# Patient Record
Sex: MALE | Race: WHITE | NOT HISPANIC OR LATINO | Employment: OTHER
[De-identification: names, ages, dates, MRNs, and addresses within clinical notes are randomized per-mention and may not be internally consistent; named-entity substitution may affect disease eponyms.]

---

## 2017-02-01 ENCOUNTER — HOSPITAL (OUTPATIENT)
Dept: OTHER | Age: 82
End: 2017-02-01
Attending: ANESTHESIOLOGY

## 2017-02-24 ENCOUNTER — HOSPITAL (OUTPATIENT)
Dept: OTHER | Age: 82
End: 2017-02-24
Attending: ANESTHESIOLOGY

## 2017-03-17 ENCOUNTER — HOSPITAL (OUTPATIENT)
Dept: OTHER | Age: 82
End: 2017-03-17

## 2017-03-22 ENCOUNTER — HOSPITAL (OUTPATIENT)
Dept: OTHER | Age: 82
End: 2017-03-22

## 2017-03-22 ENCOUNTER — IMAGING SERVICES (OUTPATIENT)
Dept: OTHER | Age: 82
End: 2017-03-22

## 2017-03-31 ENCOUNTER — HOSPITAL (OUTPATIENT)
Dept: OTHER | Age: 82
End: 2017-03-31
Attending: ANESTHESIOLOGY

## 2017-05-11 ENCOUNTER — HOSPITAL (OUTPATIENT)
Dept: OTHER | Age: 82
End: 2017-05-11
Attending: ANESTHESIOLOGY

## 2017-06-29 ENCOUNTER — HOSPITAL (OUTPATIENT)
Dept: OTHER | Age: 82
End: 2017-06-29
Attending: ANESTHESIOLOGY

## 2017-07-14 ENCOUNTER — IMAGING SERVICES (OUTPATIENT)
Dept: OTHER | Age: 82
End: 2017-07-14

## 2017-07-14 ENCOUNTER — HOSPITAL (OUTPATIENT)
Dept: OTHER | Age: 82
End: 2017-07-14

## 2017-08-09 ENCOUNTER — HOSPITAL (OUTPATIENT)
Dept: OTHER | Age: 82
End: 2017-08-09
Attending: ANESTHESIOLOGY

## 2017-08-16 ENCOUNTER — IMAGING SERVICES (OUTPATIENT)
Dept: OTHER | Age: 82
End: 2017-08-16

## 2017-08-16 ENCOUNTER — HOSPITAL (OUTPATIENT)
Dept: OTHER | Age: 82
End: 2017-08-16

## 2017-11-01 ENCOUNTER — HOSPITAL (OUTPATIENT)
Dept: OTHER | Age: 82
End: 2017-11-01

## 2018-06-06 ENCOUNTER — HOSPITAL (OUTPATIENT)
Dept: OTHER | Age: 83
End: 2018-06-06
Attending: ANESTHESIOLOGY

## 2018-07-18 ENCOUNTER — HOSPITAL (OUTPATIENT)
Dept: OTHER | Age: 83
End: 2018-07-18
Attending: FAMILY MEDICINE

## 2018-08-15 ENCOUNTER — HOSPITAL (OUTPATIENT)
Dept: OTHER | Age: 83
End: 2018-08-15
Attending: ANESTHESIOLOGY

## 2018-08-23 ENCOUNTER — HOSPITAL (OUTPATIENT)
Dept: OTHER | Age: 83
End: 2018-08-23

## 2018-08-23 ENCOUNTER — IMAGING SERVICES (OUTPATIENT)
Dept: OTHER | Age: 83
End: 2018-08-23

## 2018-09-24 ENCOUNTER — HOSPITAL (OUTPATIENT)
Dept: OTHER | Age: 83
End: 2018-09-24

## 2018-09-24 ENCOUNTER — IMAGING SERVICES (OUTPATIENT)
Dept: OTHER | Age: 83
End: 2018-09-24

## 2018-10-17 ENCOUNTER — HOSPITAL (OUTPATIENT)
Dept: OTHER | Age: 83
End: 2018-10-17
Attending: ANESTHESIOLOGY

## 2018-10-26 ENCOUNTER — HOSPITAL (OUTPATIENT)
Dept: OTHER | Age: 83
End: 2018-10-26
Attending: ANESTHESIOLOGY

## 2019-01-17 ENCOUNTER — HOSPITAL (OUTPATIENT)
Dept: OTHER | Age: 84
End: 2019-01-17

## 2019-02-06 ENCOUNTER — HOSPITAL (OUTPATIENT)
Dept: OTHER | Age: 84
End: 2019-02-06

## 2019-02-13 ENCOUNTER — HOSPITAL (OUTPATIENT)
Dept: OTHER | Age: 84
End: 2019-02-13

## 2019-07-10 ENCOUNTER — HOSPITAL (OUTPATIENT)
Dept: OTHER | Age: 84
End: 2019-07-10
Attending: INTERNAL MEDICINE

## 2019-07-10 ENCOUNTER — HOSPITAL (OUTPATIENT)
Dept: OTHER | Age: 84
End: 2019-07-10

## 2019-07-29 ENCOUNTER — HOSPITAL (OUTPATIENT)
Dept: OTHER | Age: 84
End: 2019-07-29
Attending: INTERNAL MEDICINE

## 2019-08-14 ENCOUNTER — HOSPITAL (OUTPATIENT)
Dept: OTHER | Age: 84
End: 2019-08-14

## 2019-09-11 ENCOUNTER — HOSPITAL (OUTPATIENT)
Dept: OTHER | Age: 84
End: 2019-09-11
Attending: INTERNAL MEDICINE

## 2020-05-19 ENCOUNTER — HOSPITAL (OUTPATIENT)
Dept: OTHER | Age: 85
End: 2020-05-19
Attending: ANESTHESIOLOGY

## 2020-12-19 ENCOUNTER — WALK IN (OUTPATIENT)
Dept: URGENT CARE | Age: 85
End: 2020-12-19
Attending: EMERGENCY MEDICINE

## 2020-12-19 VITALS
HEART RATE: 67 BPM | SYSTOLIC BLOOD PRESSURE: 152 MMHG | OXYGEN SATURATION: 95 % | RESPIRATION RATE: 16 BRPM | DIASTOLIC BLOOD PRESSURE: 71 MMHG | WEIGHT: 132 LBS | TEMPERATURE: 100 F

## 2020-12-19 DIAGNOSIS — Z20.822 PERSON UNDER INVESTIGATION FOR COVID-19: ICD-10-CM

## 2020-12-19 DIAGNOSIS — Z20.822 EXPOSURE TO COVID-19 VIRUS: ICD-10-CM

## 2020-12-19 DIAGNOSIS — R50.9 FEVER, UNSPECIFIED FEVER CAUSE: Primary | ICD-10-CM

## 2020-12-19 LAB — SARS-COV-2 AG RESP QL IA.RAPID: NOT DETECTED

## 2020-12-19 PROCEDURE — 99212 OFFICE O/P EST SF 10 MIN: CPT

## 2020-12-19 PROCEDURE — C9803 HOPD COVID-19 SPEC COLLECT: HCPCS

## 2020-12-19 PROCEDURE — 87426 SARSCOV CORONAVIRUS AG IA: CPT | Performed by: EMERGENCY MEDICINE

## 2020-12-19 PROCEDURE — U0003 INFECTIOUS AGENT DETECTION BY NUCLEIC ACID (DNA OR RNA); SEVERE ACUTE RESPIRATORY SYNDROME CORONAVIRUS 2 (SARS-COV-2) (CORONAVIRUS DISEASE [COVID-19]), AMPLIFIED PROBE TECHNIQUE, MAKING USE OF HIGH THROUGHPUT TECHNOLOGIES AS DESCRIBED BY CMS-2020-01-R: HCPCS | Performed by: EMERGENCY MEDICINE

## 2020-12-19 RX ORDER — PANTOPRAZOLE SODIUM 40 MG/1
TABLET, DELAYED RELEASE ORAL
COMMUNITY
Start: 2020-11-27 | End: 2022-07-15

## 2020-12-19 RX ORDER — HYDRALAZINE HYDROCHLORIDE 50 MG/1
TABLET, FILM COATED ORAL
COMMUNITY
Start: 2020-11-27 | End: 2022-07-15 | Stop reason: SDUPTHER

## 2020-12-19 RX ORDER — ISOSORBIDE MONONITRATE 30 MG/1
30 TABLET, EXTENDED RELEASE ORAL DAILY
COMMUNITY
Start: 2020-11-27

## 2020-12-19 RX ORDER — ATORVASTATIN CALCIUM 40 MG/1
40 TABLET, FILM COATED ORAL DAILY
COMMUNITY

## 2020-12-19 RX ORDER — LACTULOSE 10 G/15ML
SOLUTION ORAL DAILY
COMMUNITY
Start: 2020-10-05 | End: 2022-07-15

## 2020-12-19 RX ORDER — METOPROLOL SUCCINATE 25 MG/1
25 TABLET, EXTENDED RELEASE ORAL DAILY
COMMUNITY
Start: 2020-10-05 | End: 2023-07-07

## 2020-12-19 RX ORDER — TRAMADOL HYDROCHLORIDE 50 MG/1
50 TABLET ORAL EVERY 8 HOURS PRN
COMMUNITY
Start: 2020-10-08

## 2020-12-19 RX ORDER — BACLOFEN 10 MG/1
TABLET ORAL
COMMUNITY
Start: 2016-02-18 | End: 2022-07-15 | Stop reason: SDUPTHER

## 2020-12-19 RX ORDER — AMLODIPINE BESYLATE 5 MG/1
TABLET ORAL
COMMUNITY

## 2020-12-20 LAB
SARS-COV-2 RNA RESP QL NAA+PROBE: DETECTED
SERVICE CMNT-IMP: ABNORMAL

## 2020-12-21 ENCOUNTER — TELEPHONE (OUTPATIENT)
Dept: SCHEDULING | Age: 85
End: 2020-12-21

## 2020-12-23 ENCOUNTER — TELEPHONE (OUTPATIENT)
Dept: SCHEDULING | Age: 85
End: 2020-12-23

## 2021-01-22 ENCOUNTER — APPOINTMENT (OUTPATIENT)
Dept: ULTRASOUND IMAGING | Age: 86
End: 2021-01-22
Attending: INTERNAL MEDICINE

## 2021-02-11 ENCOUNTER — IMAGING SERVICES (OUTPATIENT)
Dept: ULTRASOUND IMAGING | Age: 86
End: 2021-02-11
Attending: INTERNAL MEDICINE

## 2021-02-11 DIAGNOSIS — E05.90 HYPERTHYROIDISM: ICD-10-CM

## 2021-02-11 DIAGNOSIS — R10.13 ABDOMINAL PAIN, EPIGASTRIC: ICD-10-CM

## 2021-02-11 DIAGNOSIS — N18.4 CHRONIC KIDNEY DISEASE, STAGE IV (SEVERE) (CMD): ICD-10-CM

## 2021-02-11 PROCEDURE — 76700 US EXAM ABDOM COMPLETE: CPT | Performed by: INTERNAL MEDICINE

## 2021-02-11 PROCEDURE — 76536 US EXAM OF HEAD AND NECK: CPT | Performed by: RADIOLOGY

## 2021-05-25 ENCOUNTER — LAB SERVICES (OUTPATIENT)
Dept: LAB | Age: 86
End: 2021-05-25

## 2021-05-25 ENCOUNTER — LAB REQUISITION (OUTPATIENT)
Dept: LAB | Age: 86
End: 2021-05-25

## 2021-05-25 DIAGNOSIS — N18.4 CHRONIC KIDNEY DISEASE, STAGE 4 (SEVERE) (CMD): ICD-10-CM

## 2021-05-25 DIAGNOSIS — R80.9 PROTEINURIA, UNSPECIFIED: ICD-10-CM

## 2021-05-25 DIAGNOSIS — D63.1 ANEMIA IN CHRONIC KIDNEY DISEASE (CODE): ICD-10-CM

## 2021-05-25 DIAGNOSIS — Z95.1 PRESENCE OF AORTOCORONARY BYPASS GRAFT: ICD-10-CM

## 2021-05-25 DIAGNOSIS — I25.10 ATHEROSCLEROTIC HEART DISEASE OF NATIVE CORONARY ARTERY WITHOUT ANGINA PECTORIS: ICD-10-CM

## 2021-05-25 DIAGNOSIS — I12.9 HYPERTENSIVE CHRONIC KIDNEY DISEASE WITH STAGE 1 THROUGH STAGE 4 CHRONIC KIDNEY DISEASE, OR UNSPECIFIED CHRONIC KIDNEY DISEASE: ICD-10-CM

## 2021-05-25 LAB
25(OH)D3+25(OH)D2 SERPL-MCNC: 50.4 NG/ML (ref 30–100)
ALBUMIN SERPL-MCNC: 3.7 G/DL (ref 3.6–5.1)
ALBUMIN/GLOB SERPL: 1.2 {RATIO} (ref 1–2.4)
ALP SERPL-CCNC: 41 UNITS/L (ref 45–117)
ALT SERPL-CCNC: 17 UNITS/L
ANION GAP SERPL CALC-SCNC: 16 MMOL/L (ref 10–20)
APPEARANCE UR: CLEAR
AST SERPL-CCNC: 11 UNITS/L
BACTERIA #/AREA URNS HPF: ABNORMAL /HPF
BASOPHILS # BLD: 0 K/MCL (ref 0–0.3)
BASOPHILS NFR BLD: 0 %
BILIRUB SERPL-MCNC: 0.5 MG/DL (ref 0.2–1)
BILIRUB UR QL STRIP: NEGATIVE
BUN SERPL-MCNC: 60 MG/DL (ref 6–20)
BUN/CREAT SERPL: 23 (ref 7–25)
CALCIUM SERPL-MCNC: 9.1 MG/DL (ref 8.4–10.2)
CHLORIDE SERPL-SCNC: 111 MMOL/L (ref 98–107)
CO2 SERPL-SCNC: 20 MMOL/L (ref 21–32)
COLOR UR: YELLOW
CREAT SERPL-MCNC: 2.6 MG/DL (ref 0.67–1.17)
DEPRECATED RDW RBC: 37.7 FL (ref 39–50)
EOSINOPHIL # BLD: 0 K/MCL (ref 0–0.5)
EOSINOPHIL NFR BLD: 0 %
ERYTHROCYTE [DISTWIDTH] IN BLOOD: 15.9 % (ref 11–15)
FASTING DURATION TIME PATIENT: 0 HOURS
FERRITIN SERPL-MCNC: 300 NG/ML (ref 26–388)
GFR SERPLBLD BASED ON 1.73 SQ M-ARVRAT: 22 ML/MIN/1.73M2
GLOBULIN SER-MCNC: 3.2 G/DL (ref 2–4)
GLUCOSE SERPL-MCNC: 91 MG/DL (ref 65–99)
GLUCOSE UR STRIP-MCNC: NEGATIVE MG/DL
HCT VFR BLD CALC: 33.3 % (ref 39–51)
HGB BLD-MCNC: 10.1 G/DL (ref 13–17)
HGB UR QL STRIP: NEGATIVE
HYALINE CASTS #/AREA URNS LPF: ABNORMAL /LPF
IMM GRANULOCYTES # BLD AUTO: 0 K/MCL (ref 0–0.2)
IMM GRANULOCYTES # BLD: 0 %
IRON SATN MFR SERPL: 12 % (ref 15–45)
IRON SERPL-MCNC: 29 MCG/DL (ref 65–175)
KETONES UR STRIP-MCNC: NEGATIVE MG/DL
LEUKOCYTE ESTERASE UR QL STRIP: NEGATIVE
LYMPHOCYTES # BLD: 0.5 K/MCL (ref 1–4)
LYMPHOCYTES NFR BLD: 6 %
MCH RBC QN AUTO: 20.6 PG (ref 26–34)
MCHC RBC AUTO-ENTMCNC: 30.3 G/DL (ref 32–36.5)
MCV RBC AUTO: 67.8 FL (ref 78–100)
MONOCYTES # BLD: 0.8 K/MCL (ref 0.3–0.9)
MONOCYTES NFR BLD: 10 %
MUCOUS THREADS URNS QL MICRO: PRESENT
NEUTROPHILS # BLD: 6.6 K/MCL (ref 1.8–7.7)
NEUTROPHILS NFR BLD: 84 %
NITRITE UR QL STRIP: NEGATIVE
NRBC BLD MANUAL-RTO: 0 /100 WBC
PH UR STRIP: 6 [PH] (ref 5–7)
PLATELET # BLD AUTO: 211 K/MCL (ref 140–450)
POTASSIUM SERPL-SCNC: 4.3 MMOL/L (ref 3.4–5.1)
PROT SERPL-MCNC: 6.9 G/DL (ref 6.4–8.2)
PROT UR STRIP-MCNC: >=500 MG/DL
RBC # BLD: 4.91 MIL/MCL (ref 4.5–5.9)
RBC #/AREA URNS HPF: ABNORMAL /HPF
SODIUM SERPL-SCNC: 143 MMOL/L (ref 135–145)
SP GR UR STRIP: 1.01 (ref 1–1.03)
SQUAMOUS #/AREA URNS HPF: ABNORMAL /HPF
TIBC SERPL-MCNC: 234 MCG/DL (ref 250–450)
UROBILINOGEN UR STRIP-MCNC: 0.2 MG/DL
WBC # BLD: 7.9 K/MCL (ref 4.2–11)
WBC #/AREA URNS HPF: ABNORMAL /HPF

## 2021-05-25 PROCEDURE — 81001 URINALYSIS AUTO W/SCOPE: CPT | Performed by: CLINICAL MEDICAL LABORATORY

## 2021-05-25 PROCEDURE — 82043 UR ALBUMIN QUANTITATIVE: CPT | Performed by: CLINICAL MEDICAL LABORATORY

## 2021-05-25 PROCEDURE — 84100 ASSAY OF PHOSPHORUS: CPT | Performed by: CLINICAL MEDICAL LABORATORY

## 2021-05-25 PROCEDURE — 83540 ASSAY OF IRON: CPT | Performed by: CLINICAL MEDICAL LABORATORY

## 2021-05-25 PROCEDURE — 83550 IRON BINDING TEST: CPT | Performed by: CLINICAL MEDICAL LABORATORY

## 2021-05-25 PROCEDURE — 82728 ASSAY OF FERRITIN: CPT | Performed by: CLINICAL MEDICAL LABORATORY

## 2021-05-25 PROCEDURE — 83970 ASSAY OF PARATHORMONE: CPT | Performed by: CLINICAL MEDICAL LABORATORY

## 2021-05-25 PROCEDURE — 82570 ASSAY OF URINE CREATININE: CPT | Performed by: CLINICAL MEDICAL LABORATORY

## 2021-05-25 PROCEDURE — 82306 VITAMIN D 25 HYDROXY: CPT | Performed by: CLINICAL MEDICAL LABORATORY

## 2021-05-25 PROCEDURE — 80053 COMPREHEN METABOLIC PANEL: CPT | Performed by: CLINICAL MEDICAL LABORATORY

## 2021-05-25 PROCEDURE — 85025 COMPLETE CBC W/AUTO DIFF WBC: CPT | Performed by: CLINICAL MEDICAL LABORATORY

## 2021-05-26 LAB
CREAT UR-MCNC: 76.4 MG/DL
MICROALBUMIN UR-MCNC: 150 MG/DL
MICROALBUMIN/CREAT UR: 1963.4 MG/G
PHOSPHATE SERPL-MCNC: 3.5 MG/DL (ref 2.4–4.7)
PTH-INTACT SERPL-MCNC: 27 PG/ML (ref 19–88)

## 2021-07-23 ENCOUNTER — LAB SERVICES (OUTPATIENT)
Dept: LAB | Age: 86
End: 2021-07-23

## 2021-07-23 ENCOUNTER — LAB REQUISITION (OUTPATIENT)
Dept: LAB | Age: 86
End: 2021-07-23

## 2021-07-23 DIAGNOSIS — E05.90 THYROTOXICOSIS, UNSPECIFIED WITHOUT THYROTOXIC CRISIS OR STORM: ICD-10-CM

## 2021-07-23 DIAGNOSIS — Z95.1 PRESENCE OF AORTOCORONARY BYPASS GRAFT: ICD-10-CM

## 2021-07-23 DIAGNOSIS — E78.5 HYPERLIPIDEMIA, UNSPECIFIED: ICD-10-CM

## 2021-07-23 LAB
ALBUMIN SERPL-MCNC: 4.1 G/DL (ref 3.6–5.1)
ALBUMIN/GLOB SERPL: 1.5 {RATIO} (ref 1–2.4)
ALP SERPL-CCNC: 34 UNITS/L (ref 45–117)
ALT SERPL-CCNC: 19 UNITS/L
ANION GAP SERPL CALC-SCNC: 11 MMOL/L (ref 10–20)
AST SERPL-CCNC: 14 UNITS/L
BASOPHILS # BLD: 0 K/MCL (ref 0–0.3)
BASOPHILS NFR BLD: 0 %
BILIRUB SERPL-MCNC: 0.7 MG/DL (ref 0.2–1)
BUN SERPL-MCNC: 71 MG/DL (ref 6–20)
BUN/CREAT SERPL: 27 (ref 7–25)
CALCIUM SERPL-MCNC: 9.6 MG/DL (ref 8.4–10.2)
CHLORIDE SERPL-SCNC: 113 MMOL/L (ref 98–107)
CHOLEST SERPL-MCNC: 125 MG/DL
CHOLEST/HDLC SERPL: 2.2 {RATIO}
CO2 SERPL-SCNC: 23 MMOL/L (ref 21–32)
CREAT SERPL-MCNC: 2.64 MG/DL (ref 0.67–1.17)
DEPRECATED RDW RBC: 40.6 FL (ref 39–50)
EOSINOPHIL # BLD: 0.1 K/MCL (ref 0–0.5)
EOSINOPHIL NFR BLD: 1 %
ERYTHROCYTE [DISTWIDTH] IN BLOOD: 17 % (ref 11–15)
FASTING DURATION TIME PATIENT: 12 HOURS
FASTING DURATION TIME PATIENT: 12 HOURS
GFR SERPLBLD BASED ON 1.73 SQ M-ARVRAT: 21 ML/MIN/1.73M2
GLOBULIN SER-MCNC: 2.7 G/DL (ref 2–4)
GLUCOSE SERPL-MCNC: 107 MG/DL (ref 65–99)
HCT VFR BLD CALC: 32 % (ref 39–51)
HDLC SERPL-MCNC: 57 MG/DL
HGB BLD-MCNC: 9.6 G/DL (ref 13–17)
IMM GRANULOCYTES # BLD AUTO: 0 K/MCL (ref 0–0.2)
IMM GRANULOCYTES # BLD: 0 %
LDLC SERPL CALC-MCNC: 52 MG/DL
LYMPHOCYTES # BLD: 1.3 K/MCL (ref 1–4)
LYMPHOCYTES NFR BLD: 17 %
MCH RBC QN AUTO: 20.6 PG (ref 26–34)
MCHC RBC AUTO-ENTMCNC: 30 G/DL (ref 32–36.5)
MCV RBC AUTO: 68.8 FL (ref 78–100)
MONOCYTES # BLD: 0.8 K/MCL (ref 0.3–0.9)
MONOCYTES NFR BLD: 10 %
NEUTROPHILS # BLD: 5.6 K/MCL (ref 1.8–7.7)
NEUTROPHILS NFR BLD: 72 %
NONHDLC SERPL-MCNC: 68 MG/DL
NRBC BLD MANUAL-RTO: 0 /100 WBC
PLATELET # BLD AUTO: 212 K/MCL (ref 140–450)
POTASSIUM SERPL-SCNC: 5 MMOL/L (ref 3.4–5.1)
PROT SERPL-MCNC: 6.8 G/DL (ref 6.4–8.2)
RBC # BLD: 4.65 MIL/MCL (ref 4.5–5.9)
SODIUM SERPL-SCNC: 142 MMOL/L (ref 135–145)
T4 SERPL-MCNC: 8.1 MCG/DL (ref 4.7–13.3)
TRIGL SERPL-MCNC: 79 MG/DL
TSH SERPL-ACNC: 1.01 MCUNITS/ML (ref 0.35–5)
WBC # BLD: 7.8 K/MCL (ref 4.2–11)

## 2021-07-23 PROCEDURE — 84436 ASSAY OF TOTAL THYROXINE: CPT | Performed by: CLINICAL MEDICAL LABORATORY

## 2021-07-23 PROCEDURE — 80050 GENERAL HEALTH PANEL: CPT | Performed by: CLINICAL MEDICAL LABORATORY

## 2021-07-23 PROCEDURE — 80061 LIPID PANEL: CPT | Performed by: CLINICAL MEDICAL LABORATORY

## 2021-09-14 ENCOUNTER — LAB SERVICES (OUTPATIENT)
Dept: LAB | Age: 86
End: 2021-09-14

## 2021-09-14 ENCOUNTER — LAB REQUISITION (OUTPATIENT)
Dept: LAB | Age: 86
End: 2021-09-14

## 2021-09-14 DIAGNOSIS — I12.9 HYPERTENSIVE CHRONIC KIDNEY DISEASE WITH STAGE 1 THROUGH STAGE 4 CHRONIC KIDNEY DISEASE, OR UNSPECIFIED CHRONIC KIDNEY DISEASE: ICD-10-CM

## 2021-09-14 DIAGNOSIS — D63.1 ANEMIA IN CHRONIC KIDNEY DISEASE (CODE): ICD-10-CM

## 2021-09-14 DIAGNOSIS — I25.10 ATHEROSCLEROTIC HEART DISEASE OF NATIVE CORONARY ARTERY WITHOUT ANGINA PECTORIS: ICD-10-CM

## 2021-09-14 DIAGNOSIS — R80.9 PROTEINURIA, UNSPECIFIED: ICD-10-CM

## 2021-09-14 DIAGNOSIS — N18.4 CHRONIC KIDNEY DISEASE, STAGE 4 (SEVERE) (CMD): ICD-10-CM

## 2021-09-14 DIAGNOSIS — Z95.1 PRESENCE OF AORTOCORONARY BYPASS GRAFT: ICD-10-CM

## 2021-09-14 LAB
25(OH)D3+25(OH)D2 SERPL-MCNC: 45.2 NG/ML (ref 30–100)
BASOPHILS # BLD: 0 K/MCL (ref 0–0.3)
BASOPHILS NFR BLD: 0 %
DEPRECATED RDW RBC: 41.7 FL (ref 39–50)
EOSINOPHIL # BLD: 0.1 K/MCL (ref 0–0.5)
EOSINOPHIL NFR BLD: 1 %
ERYTHROCYTE [DISTWIDTH] IN BLOOD: 17 % (ref 11–15)
FERRITIN SERPL-MCNC: 286 NG/ML (ref 26–388)
HBV SURFACE AB SER QL: NEGATIVE
HBV SURFACE AG SER QL: NEGATIVE
HCT VFR BLD CALC: 32.1 % (ref 39–51)
HCV AB SER QL: NEGATIVE
HGB BLD-MCNC: 9.6 G/DL (ref 13–17)
IMM GRANULOCYTES # BLD AUTO: 0 K/MCL (ref 0–0.2)
IMM GRANULOCYTES # BLD: 0 %
IRON SATN MFR SERPL: 33 % (ref 15–45)
IRON SERPL-MCNC: 83 MCG/DL (ref 65–175)
LYMPHOCYTES # BLD: 1 K/MCL (ref 1–4)
LYMPHOCYTES NFR BLD: 17 %
MCH RBC QN AUTO: 20.7 PG (ref 26–34)
MCHC RBC AUTO-ENTMCNC: 29.9 G/DL (ref 32–36.5)
MCV RBC AUTO: 69.2 FL (ref 78–100)
MONOCYTES # BLD: 0.5 K/MCL (ref 0.3–0.9)
MONOCYTES NFR BLD: 8 %
NEUTROPHILS # BLD: 4.7 K/MCL (ref 1.8–7.7)
NEUTROPHILS NFR BLD: 74 %
NRBC BLD MANUAL-RTO: 0 /100 WBC
PHOSPHATE SERPL-MCNC: 3.4 MG/DL (ref 2.4–4.7)
PLATELET # BLD AUTO: 221 K/MCL (ref 140–450)
RBC # BLD: 4.64 MIL/MCL (ref 4.5–5.9)
TIBC SERPL-MCNC: 249 MCG/DL (ref 250–450)
WBC # BLD: 6.3 K/MCL (ref 4.2–11)

## 2021-09-14 PROCEDURE — 86706 HEP B SURFACE ANTIBODY: CPT | Performed by: CLINICAL MEDICAL LABORATORY

## 2021-09-14 PROCEDURE — 85025 COMPLETE CBC W/AUTO DIFF WBC: CPT | Performed by: CLINICAL MEDICAL LABORATORY

## 2021-09-14 PROCEDURE — 86803 HEPATITIS C AB TEST: CPT | Performed by: CLINICAL MEDICAL LABORATORY

## 2021-09-14 PROCEDURE — 87340 HEPATITIS B SURFACE AG IA: CPT | Performed by: CLINICAL MEDICAL LABORATORY

## 2021-09-14 PROCEDURE — 83550 IRON BINDING TEST: CPT | Performed by: CLINICAL MEDICAL LABORATORY

## 2021-09-14 PROCEDURE — 81001 URINALYSIS AUTO W/SCOPE: CPT | Performed by: CLINICAL MEDICAL LABORATORY

## 2021-09-14 PROCEDURE — 82570 ASSAY OF URINE CREATININE: CPT | Performed by: CLINICAL MEDICAL LABORATORY

## 2021-09-14 PROCEDURE — 86335 IMMUNFIX E-PHORSIS/URINE/CSF: CPT | Performed by: CLINICAL MEDICAL LABORATORY

## 2021-09-14 PROCEDURE — 82306 VITAMIN D 25 HYDROXY: CPT | Performed by: CLINICAL MEDICAL LABORATORY

## 2021-09-14 PROCEDURE — 83540 ASSAY OF IRON: CPT | Performed by: CLINICAL MEDICAL LABORATORY

## 2021-09-14 PROCEDURE — 84165 PROTEIN E-PHORESIS SERUM: CPT | Performed by: CLINICAL MEDICAL LABORATORY

## 2021-09-14 PROCEDURE — 84100 ASSAY OF PHOSPHORUS: CPT | Performed by: CLINICAL MEDICAL LABORATORY

## 2021-09-14 PROCEDURE — 82043 UR ALBUMIN QUANTITATIVE: CPT | Performed by: CLINICAL MEDICAL LABORATORY

## 2021-09-14 PROCEDURE — 82728 ASSAY OF FERRITIN: CPT | Performed by: CLINICAL MEDICAL LABORATORY

## 2021-09-14 PROCEDURE — 84155 ASSAY OF PROTEIN SERUM: CPT | Performed by: CLINICAL MEDICAL LABORATORY

## 2021-09-15 LAB
ALBUMIN SERPL ELPH-MCNC: 4.3 G/DL (ref 3.5–4.9)
ALPHA 1: 0.3 G/DL (ref 0.2–0.4)
ALPHA2 GLOB SERPL ELPH-MCNC: 0.9 G/DL (ref 0.5–0.9)
APPEARANCE UR: CLEAR
B-GLOBULIN SERPL ELPH-MCNC: 0.6 G/DL (ref 0.7–1.2)
BACTERIA #/AREA URNS HPF: ABNORMAL /HPF
BILIRUB UR QL STRIP: NEGATIVE
COLOR UR: YELLOW
CREAT UR-MCNC: 81.5 MG/DL
GAMMA GLOB SERPL ELPH-MCNC: 0.8 G/DL (ref 0.7–1.7)
GLOBULIN SER-MCNC: 2.6 G/DL (ref 2.1–4.2)
GLUCOSE UR STRIP-MCNC: NEGATIVE MG/DL
HGB UR QL STRIP: NEGATIVE
HYALINE CASTS #/AREA URNS LPF: ABNORMAL /LPF
KETONES UR STRIP-MCNC: NEGATIVE MG/DL
LEUKOCYTE ESTERASE UR QL STRIP: NEGATIVE
MICROALBUMIN UR-MCNC: 161 MG/DL
MICROALBUMIN/CREAT UR: 1975.5 MG/G
MUCOUS THREADS URNS QL MICRO: PRESENT
NITRITE UR QL STRIP: NEGATIVE
PATH INTERP SPEC-IMP: ABNORMAL
PATH INTERP SPEC-IMP: NORMAL
PH UR STRIP: 6 [PH] (ref 5–7)
PROT SERPL-MCNC: 6.9 G/DL (ref 6.4–8.2)
PROT UR STRIP-MCNC: 100 MG/DL
RBC #/AREA URNS HPF: ABNORMAL /HPF
SP GR UR STRIP: 1.01 (ref 1–1.03)
SQUAMOUS #/AREA URNS HPF: ABNORMAL /HPF
UROBILINOGEN UR STRIP-MCNC: 0.2 MG/DL
WBC #/AREA URNS HPF: ABNORMAL /HPF

## 2021-09-23 ENCOUNTER — LAB REQUISITION (OUTPATIENT)
Dept: LAB | Age: 86
End: 2021-09-23

## 2021-09-23 ENCOUNTER — LAB SERVICES (OUTPATIENT)
Dept: LAB | Age: 86
End: 2021-09-23

## 2021-09-23 DIAGNOSIS — I12.9 HYPERTENSIVE CHRONIC KIDNEY DISEASE WITH STAGE 1 THROUGH STAGE 4 CHRONIC KIDNEY DISEASE, OR UNSPECIFIED CHRONIC KIDNEY DISEASE: ICD-10-CM

## 2021-09-23 DIAGNOSIS — N18.4 CHRONIC KIDNEY DISEASE, STAGE 4 (SEVERE) (CMD): ICD-10-CM

## 2021-09-23 PROCEDURE — 80053 COMPREHEN METABOLIC PANEL: CPT | Performed by: CLINICAL MEDICAL LABORATORY

## 2021-09-24 LAB
ALBUMIN SERPL-MCNC: 3.9 G/DL (ref 3.6–5.1)
ALBUMIN/GLOB SERPL: 1.3 {RATIO} (ref 1–2.4)
ALP SERPL-CCNC: 36 UNITS/L (ref 45–117)
ALT SERPL-CCNC: 18 UNITS/L
ANION GAP SERPL CALC-SCNC: 14 MMOL/L (ref 10–20)
AST SERPL-CCNC: 14 UNITS/L
BILIRUB SERPL-MCNC: 0.6 MG/DL (ref 0.2–1)
BUN SERPL-MCNC: 62 MG/DL (ref 6–20)
BUN/CREAT SERPL: 22 (ref 7–25)
CALCIUM SERPL-MCNC: 9.1 MG/DL (ref 8.4–10.2)
CHLORIDE SERPL-SCNC: 106 MMOL/L (ref 98–107)
CO2 SERPL-SCNC: 23 MMOL/L (ref 21–32)
CREAT SERPL-MCNC: 2.84 MG/DL (ref 0.67–1.17)
FASTING DURATION TIME PATIENT: ABNORMAL H
GFR SERPLBLD BASED ON 1.73 SQ M-ARVRAT: 20 ML/MIN
GLOBULIN SER-MCNC: 2.9 G/DL (ref 2–4)
GLUCOSE SERPL-MCNC: 86 MG/DL (ref 70–99)
POTASSIUM SERPL-SCNC: 4.4 MMOL/L (ref 3.4–5.1)
PROT SERPL-MCNC: 6.8 G/DL (ref 6.4–8.2)
SODIUM SERPL-SCNC: 139 MMOL/L (ref 135–145)

## 2021-11-08 RX ORDER — OMEPRAZOLE 20 MG/1
20 CAPSULE, DELAYED RELEASE ORAL DAILY
COMMUNITY
End: 2022-07-15

## 2021-11-08 RX ORDER — TAMSULOSIN HYDROCHLORIDE 0.4 MG/1
0.4 CAPSULE ORAL DAILY
COMMUNITY

## 2021-11-08 RX ORDER — ACETAMINOPHEN 500 MG
1000 TABLET ORAL 2 TIMES DAILY
COMMUNITY

## 2021-11-08 RX ORDER — FAMOTIDINE 40 MG/1
40 TABLET, FILM COATED ORAL DAILY
COMMUNITY

## 2021-11-08 RX ORDER — METHIMAZOLE 5 MG/1
5 TABLET ORAL DAILY
COMMUNITY
End: 2023-07-07

## 2021-12-21 ENCOUNTER — LAB REQUISITION (OUTPATIENT)
Dept: LAB | Age: 86
End: 2021-12-21

## 2021-12-21 ENCOUNTER — LAB SERVICES (OUTPATIENT)
Dept: LAB | Age: 86
End: 2021-12-21

## 2021-12-21 DIAGNOSIS — N18.4 CHRONIC KIDNEY DISEASE, STAGE 4 (SEVERE) (CMD): ICD-10-CM

## 2021-12-21 DIAGNOSIS — I12.9 HYPERTENSIVE CHRONIC KIDNEY DISEASE WITH STAGE 1 THROUGH STAGE 4 CHRONIC KIDNEY DISEASE, OR UNSPECIFIED CHRONIC KIDNEY DISEASE: ICD-10-CM

## 2021-12-21 DIAGNOSIS — R80.9 PROTEINURIA, UNSPECIFIED: ICD-10-CM

## 2021-12-21 DIAGNOSIS — D63.1 ANEMIA IN CHRONIC KIDNEY DISEASE (CODE): ICD-10-CM

## 2021-12-21 DIAGNOSIS — I25.10 ATHEROSCLEROTIC HEART DISEASE OF NATIVE CORONARY ARTERY WITHOUT ANGINA PECTORIS: ICD-10-CM

## 2021-12-21 LAB
25(OH)D3+25(OH)D2 SERPL-MCNC: 49.2 NG/ML (ref 30–100)
ALBUMIN SERPL-MCNC: 3.6 G/DL (ref 3.6–5.1)
ALBUMIN/GLOB SERPL: 1.2 {RATIO} (ref 1–2.4)
ALP SERPL-CCNC: 36 UNITS/L (ref 45–117)
ALT SERPL-CCNC: 15 UNITS/L
ANION GAP SERPL CALC-SCNC: 13 MMOL/L (ref 10–20)
APPEARANCE UR: CLEAR
AST SERPL-CCNC: 10 UNITS/L
BACTERIA #/AREA URNS HPF: ABNORMAL /HPF
BASOPHILS # BLD: 0 K/MCL (ref 0–0.3)
BASOPHILS NFR BLD: 0 %
BILIRUB SERPL-MCNC: 0.5 MG/DL (ref 0.2–1)
BILIRUB UR QL STRIP: NEGATIVE
BUN SERPL-MCNC: 69 MG/DL (ref 6–20)
BUN/CREAT SERPL: 22 (ref 7–25)
CALCIUM SERPL-MCNC: 9.2 MG/DL (ref 8.4–10.2)
CHLORIDE SERPL-SCNC: 113 MMOL/L (ref 98–107)
CO2 SERPL-SCNC: 22 MMOL/L (ref 21–32)
COLOR UR: YELLOW
CREAT SERPL-MCNC: 3.12 MG/DL (ref 0.67–1.17)
CREAT UR-MCNC: 110 MG/DL
DEPRECATED RDW RBC: 42.5 FL (ref 39–50)
EOSINOPHIL # BLD: 0.1 K/MCL (ref 0–0.5)
EOSINOPHIL NFR BLD: 1 %
ERYTHROCYTE [DISTWIDTH] IN BLOOD: 17.2 % (ref 11–15)
FASTING DURATION TIME PATIENT: ABNORMAL H
FERRITIN SERPL-MCNC: 230 NG/ML (ref 26–388)
GFR SERPLBLD BASED ON 1.73 SQ M-ARVRAT: 17 ML/MIN
GLOBULIN SER-MCNC: 2.9 G/DL (ref 2–4)
GLUCOSE SERPL-MCNC: 105 MG/DL (ref 70–99)
GLUCOSE UR STRIP-MCNC: NEGATIVE MG/DL
HCT VFR BLD CALC: 31.7 % (ref 39–51)
HGB BLD-MCNC: 9.3 G/DL (ref 13–17)
HGB UR QL STRIP: NEGATIVE
HYALINE CASTS #/AREA URNS LPF: ABNORMAL /LPF
IMM GRANULOCYTES # BLD AUTO: 0 K/MCL (ref 0–0.2)
IMM GRANULOCYTES # BLD: 0 %
IRON SATN MFR SERPL: 29 % (ref 15–45)
IRON SERPL-MCNC: 66 MCG/DL (ref 65–175)
KETONES UR STRIP-MCNC: NEGATIVE MG/DL
LEUKOCYTE ESTERASE UR QL STRIP: NEGATIVE
LYMPHOCYTES # BLD: 0.8 K/MCL (ref 1–4)
LYMPHOCYTES NFR BLD: 11 %
MCH RBC QN AUTO: 20.4 PG (ref 26–34)
MCHC RBC AUTO-ENTMCNC: 29.3 G/DL (ref 32–36.5)
MCV RBC AUTO: 69.7 FL (ref 78–100)
MICROALBUMIN UR-MCNC: 241 MG/DL
MICROALBUMIN/CREAT UR: 2190.9 MG/G
MONOCYTES # BLD: 0.9 K/MCL (ref 0.3–0.9)
MONOCYTES NFR BLD: 12 %
MUCOUS THREADS URNS QL MICRO: PRESENT
NEUTROPHILS # BLD: 5.8 K/MCL (ref 1.8–7.7)
NEUTROPHILS NFR BLD: 76 %
NITRITE UR QL STRIP: NEGATIVE
NRBC BLD MANUAL-RTO: 0 /100 WBC
PH UR STRIP: 5 [PH] (ref 5–7)
PHOSPHATE SERPL-MCNC: 3.7 MG/DL (ref 2.4–4.7)
PLATELET # BLD AUTO: 209 K/MCL (ref 140–450)
POTASSIUM SERPL-SCNC: 4.6 MMOL/L (ref 3.4–5.1)
PROT SERPL-MCNC: 6.5 G/DL (ref 6.4–8.2)
PROT UR STRIP-MCNC: >=500 MG/DL
RBC # BLD: 4.55 MIL/MCL (ref 4.5–5.9)
RBC #/AREA URNS HPF: ABNORMAL /HPF
SODIUM SERPL-SCNC: 143 MMOL/L (ref 135–145)
SP GR UR STRIP: 1.02 (ref 1–1.03)
SQUAMOUS #/AREA URNS HPF: ABNORMAL /HPF
TIBC SERPL-MCNC: 224 MCG/DL (ref 250–450)
UROBILINOGEN UR STRIP-MCNC: 0.2 MG/DL
WBC # BLD: 7.7 K/MCL (ref 4.2–11)
WBC #/AREA URNS HPF: ABNORMAL /HPF

## 2021-12-21 PROCEDURE — 83550 IRON BINDING TEST: CPT | Performed by: CLINICAL MEDICAL LABORATORY

## 2021-12-21 PROCEDURE — 80053 COMPREHEN METABOLIC PANEL: CPT | Performed by: CLINICAL MEDICAL LABORATORY

## 2021-12-21 PROCEDURE — 82043 UR ALBUMIN QUANTITATIVE: CPT | Performed by: CLINICAL MEDICAL LABORATORY

## 2021-12-21 PROCEDURE — 82570 ASSAY OF URINE CREATININE: CPT | Performed by: CLINICAL MEDICAL LABORATORY

## 2021-12-21 PROCEDURE — 84100 ASSAY OF PHOSPHORUS: CPT | Performed by: CLINICAL MEDICAL LABORATORY

## 2021-12-21 PROCEDURE — 82306 VITAMIN D 25 HYDROXY: CPT | Performed by: CLINICAL MEDICAL LABORATORY

## 2021-12-21 PROCEDURE — 85025 COMPLETE CBC W/AUTO DIFF WBC: CPT | Performed by: CLINICAL MEDICAL LABORATORY

## 2021-12-21 PROCEDURE — 81001 URINALYSIS AUTO W/SCOPE: CPT | Performed by: CLINICAL MEDICAL LABORATORY

## 2021-12-21 PROCEDURE — 82728 ASSAY OF FERRITIN: CPT | Performed by: CLINICAL MEDICAL LABORATORY

## 2021-12-21 PROCEDURE — 83540 ASSAY OF IRON: CPT | Performed by: CLINICAL MEDICAL LABORATORY

## 2022-01-11 ENCOUNTER — OFFICE VISIT (OUTPATIENT)
Dept: CARDIOLOGY | Age: 87
End: 2022-01-11

## 2022-01-11 VITALS
HEART RATE: 80 BPM | BODY MASS INDEX: 24.82 KG/M2 | SYSTOLIC BLOOD PRESSURE: 125 MMHG | OXYGEN SATURATION: 96 % | HEIGHT: 64 IN | DIASTOLIC BLOOD PRESSURE: 66 MMHG | WEIGHT: 145.4 LBS

## 2022-01-11 DIAGNOSIS — I49.3 PVC'S (PREMATURE VENTRICULAR CONTRACTIONS): Primary | ICD-10-CM

## 2022-01-11 DIAGNOSIS — E05.90 HYPERTHYROIDISM: ICD-10-CM

## 2022-01-11 DIAGNOSIS — I25.10 ATHEROSCLEROSIS OF NATIVE CORONARY ARTERY OF NATIVE HEART WITHOUT ANGINA PECTORIS: ICD-10-CM

## 2022-01-11 DIAGNOSIS — I10 PRIMARY HYPERTENSION: ICD-10-CM

## 2022-01-11 DIAGNOSIS — I49.9 REGULARLY IRREGULAR PULSE RHYTHYM: ICD-10-CM

## 2022-01-11 DIAGNOSIS — N18.4 CHRONIC KIDNEY DISEASE, STAGE IV (SEVERE) (CMD): ICD-10-CM

## 2022-01-11 DIAGNOSIS — Z95.1 HISTORY OF CORONARY ARTERY BYPASS SURGERY: ICD-10-CM

## 2022-01-11 PROCEDURE — 3074F SYST BP LT 130 MM HG: CPT | Performed by: INTERNAL MEDICINE

## 2022-01-11 PROCEDURE — 3078F DIAST BP <80 MM HG: CPT | Performed by: INTERNAL MEDICINE

## 2022-01-11 PROCEDURE — 99204 OFFICE O/P NEW MOD 45 MIN: CPT | Performed by: INTERNAL MEDICINE

## 2022-01-11 RX ORDER — AMLODIPINE BESYLATE 10 MG/1
10 TABLET ORAL DAILY
COMMUNITY
Start: 2021-11-09 | End: 2022-07-15 | Stop reason: SDUPTHER

## 2022-01-11 RX ORDER — HYDRALAZINE HYDROCHLORIDE 100 MG/1
TABLET, FILM COATED ORAL
COMMUNITY
Start: 2022-01-07

## 2022-01-11 RX ORDER — FERROUS SULFATE 325(65) MG
325 TABLET ORAL
COMMUNITY

## 2022-01-11 RX ORDER — TRAZODONE HYDROCHLORIDE 50 MG/1
50 TABLET ORAL PRN
COMMUNITY

## 2022-01-12 DIAGNOSIS — I49.9 REGULARLY IRREGULAR PULSE RHYTHYM: ICD-10-CM

## 2022-01-12 PROCEDURE — 93000 ELECTROCARDIOGRAM COMPLETE: CPT | Performed by: INTERNAL MEDICINE

## 2022-01-21 ENCOUNTER — LAB SERVICES (OUTPATIENT)
Dept: LAB | Age: 87
End: 2022-01-21

## 2022-01-21 ENCOUNTER — LAB REQUISITION (OUTPATIENT)
Dept: LAB | Age: 87
End: 2022-01-21

## 2022-01-21 DIAGNOSIS — I12.9 HYPERTENSIVE CHRONIC KIDNEY DISEASE WITH STAGE 1 THROUGH STAGE 4 CHRONIC KIDNEY DISEASE, OR UNSPECIFIED CHRONIC KIDNEY DISEASE: ICD-10-CM

## 2022-01-21 DIAGNOSIS — N18.4 CHRONIC KIDNEY DISEASE, STAGE 4 (SEVERE) (CMD): ICD-10-CM

## 2022-01-21 LAB
ANION GAP SERPL CALC-SCNC: 15 MMOL/L (ref 10–20)
BUN SERPL-MCNC: 69 MG/DL (ref 6–20)
BUN/CREAT SERPL: 23 (ref 7–25)
CALCIUM SERPL-MCNC: 9.1 MG/DL (ref 8.4–10.2)
CHLORIDE SERPL-SCNC: 106 MMOL/L (ref 98–107)
CO2 SERPL-SCNC: 20 MMOL/L (ref 21–32)
CREAT SERPL-MCNC: 3.06 MG/DL (ref 0.67–1.17)
FASTING DURATION TIME PATIENT: 4 HOURS (ref 0–999)
GFR SERPLBLD BASED ON 1.73 SQ M-ARVRAT: 18 ML/MIN
GLUCOSE SERPL-MCNC: 108 MG/DL (ref 70–99)
POTASSIUM SERPL-SCNC: 4.2 MMOL/L (ref 3.4–5.1)
SODIUM SERPL-SCNC: 137 MMOL/L (ref 135–145)

## 2022-01-21 PROCEDURE — 80048 BASIC METABOLIC PNL TOTAL CA: CPT | Performed by: CLINICAL MEDICAL LABORATORY

## 2022-03-29 ENCOUNTER — LAB SERVICES (OUTPATIENT)
Dept: LAB | Age: 87
End: 2022-03-29

## 2022-03-29 ENCOUNTER — LAB REQUISITION (OUTPATIENT)
Dept: LAB | Age: 87
End: 2022-03-29

## 2022-03-29 DIAGNOSIS — I12.9 HYPERTENSIVE CHRONIC KIDNEY DISEASE WITH STAGE 1 THROUGH STAGE 4 CHRONIC KIDNEY DISEASE, OR UNSPECIFIED CHRONIC KIDNEY DISEASE: ICD-10-CM

## 2022-03-29 DIAGNOSIS — N18.4 CHRONIC KIDNEY DISEASE, STAGE 4 (SEVERE) (CMD): ICD-10-CM

## 2022-03-29 DIAGNOSIS — R80.9 PROTEINURIA, UNSPECIFIED: ICD-10-CM

## 2022-03-29 DIAGNOSIS — Z95.1 PRESENCE OF AORTOCORONARY BYPASS GRAFT: ICD-10-CM

## 2022-03-29 DIAGNOSIS — D50.8 OTHER IRON DEFICIENCY ANEMIAS: ICD-10-CM

## 2022-03-29 DIAGNOSIS — D63.1 ANEMIA IN CHRONIC KIDNEY DISEASE (CODE): ICD-10-CM

## 2022-03-29 LAB
25(OH)D3+25(OH)D2 SERPL-MCNC: 46.1 NG/ML (ref 30–100)
ALBUMIN SERPL-MCNC: 3.9 G/DL (ref 3.6–5.1)
ALBUMIN/GLOB SERPL: 1.3 {RATIO} (ref 1–2.4)
ALP SERPL-CCNC: 36 UNITS/L (ref 45–117)
ALT SERPL-CCNC: 18 UNITS/L
ANION GAP SERPL CALC-SCNC: 11 MMOL/L (ref 10–20)
AST SERPL-CCNC: 13 UNITS/L
BASOPHILS # BLD: 0 K/MCL (ref 0–0.3)
BASOPHILS NFR BLD: 0 %
BILIRUB SERPL-MCNC: 0.6 MG/DL (ref 0.2–1)
BUN SERPL-MCNC: 70 MG/DL (ref 6–20)
BUN/CREAT SERPL: 22 (ref 7–25)
CALCIUM SERPL-MCNC: 8.9 MG/DL (ref 8.4–10.2)
CHLORIDE SERPL-SCNC: 112 MMOL/L (ref 98–107)
CO2 SERPL-SCNC: 23 MMOL/L (ref 21–32)
CREAT SERPL-MCNC: 3.12 MG/DL (ref 0.67–1.17)
DEPRECATED RDW RBC: 40.5 FL (ref 39–50)
EOSINOPHIL # BLD: 0.1 K/MCL (ref 0–0.5)
EOSINOPHIL NFR BLD: 1 %
ERYTHROCYTE [DISTWIDTH] IN BLOOD: 17 % (ref 11–15)
FASTING DURATION TIME PATIENT: ABNORMAL H
FERRITIN SERPL-MCNC: 277 NG/ML (ref 26–388)
GFR SERPLBLD BASED ON 1.73 SQ M-ARVRAT: 17 ML/MIN
GLOBULIN SER-MCNC: 3 G/DL (ref 2–4)
GLUCOSE SERPL-MCNC: 119 MG/DL (ref 70–99)
HCT VFR BLD CALC: 29.6 % (ref 39–51)
HGB BLD-MCNC: 9.1 G/DL (ref 13–17)
IMM GRANULOCYTES # BLD AUTO: 0 K/MCL (ref 0–0.2)
IMM GRANULOCYTES # BLD: 0 %
IRON SATN MFR SERPL: 34 % (ref 15–45)
IRON SERPL-MCNC: 92 MCG/DL (ref 65–175)
LYMPHOCYTES # BLD: 1.2 K/MCL (ref 1–4)
LYMPHOCYTES NFR BLD: 14 %
MCH RBC QN AUTO: 21.2 PG (ref 26–34)
MCHC RBC AUTO-ENTMCNC: 30.7 G/DL (ref 32–36.5)
MCV RBC AUTO: 68.8 FL (ref 78–100)
MONOCYTES # BLD: 0.8 K/MCL (ref 0.3–0.9)
MONOCYTES NFR BLD: 10 %
NEUTROPHILS # BLD: 6.2 K/MCL (ref 1.8–7.7)
NEUTROPHILS NFR BLD: 75 %
NRBC BLD MANUAL-RTO: 0 /100 WBC
PHOSPHATE SERPL-MCNC: 4.1 MG/DL (ref 2.4–4.7)
PLATELET # BLD AUTO: 252 K/MCL (ref 140–450)
POTASSIUM SERPL-SCNC: 5 MMOL/L (ref 3.4–5.1)
PROT SERPL-MCNC: 6.9 G/DL (ref 6.4–8.2)
RBC # BLD: 4.3 MIL/MCL (ref 4.5–5.9)
SODIUM SERPL-SCNC: 141 MMOL/L (ref 135–145)
TIBC SERPL-MCNC: 267 MCG/DL (ref 250–450)
WBC # BLD: 8.3 K/MCL (ref 4.2–11)

## 2022-03-29 PROCEDURE — 82306 VITAMIN D 25 HYDROXY: CPT | Performed by: CLINICAL MEDICAL LABORATORY

## 2022-03-29 PROCEDURE — 82728 ASSAY OF FERRITIN: CPT | Performed by: CLINICAL MEDICAL LABORATORY

## 2022-03-29 PROCEDURE — 82043 UR ALBUMIN QUANTITATIVE: CPT | Performed by: CLINICAL MEDICAL LABORATORY

## 2022-03-29 PROCEDURE — 85025 COMPLETE CBC W/AUTO DIFF WBC: CPT | Performed by: CLINICAL MEDICAL LABORATORY

## 2022-03-29 PROCEDURE — 83970 ASSAY OF PARATHORMONE: CPT | Performed by: CLINICAL MEDICAL LABORATORY

## 2022-03-29 PROCEDURE — 81001 URINALYSIS AUTO W/SCOPE: CPT | Performed by: CLINICAL MEDICAL LABORATORY

## 2022-03-29 PROCEDURE — 83540 ASSAY OF IRON: CPT | Performed by: CLINICAL MEDICAL LABORATORY

## 2022-03-29 PROCEDURE — 83550 IRON BINDING TEST: CPT | Performed by: CLINICAL MEDICAL LABORATORY

## 2022-03-29 PROCEDURE — 82570 ASSAY OF URINE CREATININE: CPT | Performed by: CLINICAL MEDICAL LABORATORY

## 2022-03-29 PROCEDURE — 84100 ASSAY OF PHOSPHORUS: CPT | Performed by: CLINICAL MEDICAL LABORATORY

## 2022-03-29 PROCEDURE — 80053 COMPREHEN METABOLIC PANEL: CPT | Performed by: CLINICAL MEDICAL LABORATORY

## 2022-03-30 LAB
APPEARANCE UR: CLEAR
BACTERIA #/AREA URNS HPF: ABNORMAL /HPF
BILIRUB UR QL STRIP: NEGATIVE
COLOR UR: YELLOW
CREAT UR-MCNC: 88.9 MG/DL
GLUCOSE UR STRIP-MCNC: NEGATIVE MG/DL
HGB UR QL STRIP: NEGATIVE
HYALINE CASTS #/AREA URNS LPF: ABNORMAL /LPF
KETONES UR STRIP-MCNC: NEGATIVE MG/DL
LEUKOCYTE ESTERASE UR QL STRIP: NEGATIVE
MICROALBUMIN UR-MCNC: 178 MG/DL
MICROALBUMIN/CREAT UR: 2002.2 MG/G
MUCOUS THREADS URNS QL MICRO: PRESENT
NITRITE UR QL STRIP: NEGATIVE
PH UR STRIP: 5 [PH] (ref 5–7)
PROT UR STRIP-MCNC: 100 MG/DL
PTH-INTACT SERPL-MCNC: 63 PG/ML (ref 19–88)
RBC #/AREA URNS HPF: ABNORMAL /HPF
SP GR UR STRIP: 1.02 (ref 1–1.03)
SQUAMOUS #/AREA URNS HPF: ABNORMAL /HPF
UROBILINOGEN UR STRIP-MCNC: 0.2 MG/DL
WBC #/AREA URNS HPF: ABNORMAL /HPF

## 2022-05-07 ENCOUNTER — LAB SERVICES (OUTPATIENT)
Dept: LAB | Age: 87
End: 2022-05-07

## 2022-05-07 ENCOUNTER — LAB REQUISITION (OUTPATIENT)
Dept: LAB | Age: 87
End: 2022-05-07

## 2022-05-07 DIAGNOSIS — N18.4 CHRONIC KIDNEY DISEASE, STAGE 4 (SEVERE) (CMD): ICD-10-CM

## 2022-05-07 PROCEDURE — 80048 BASIC METABOLIC PNL TOTAL CA: CPT | Performed by: CLINICAL MEDICAL LABORATORY

## 2022-05-08 LAB
ANION GAP SERPL CALC-SCNC: 13 MMOL/L (ref 10–20)
BUN SERPL-MCNC: 69 MG/DL (ref 6–20)
BUN/CREAT SERPL: 23 (ref 7–25)
CALCIUM SERPL-MCNC: 9.3 MG/DL (ref 8.4–10.2)
CHLORIDE SERPL-SCNC: 112 MMOL/L (ref 98–107)
CO2 SERPL-SCNC: 20 MMOL/L (ref 21–32)
CREAT SERPL-MCNC: 3.04 MG/DL (ref 0.67–1.17)
FASTING DURATION TIME PATIENT: 0 HOURS (ref 0–999)
GFR SERPLBLD BASED ON 1.73 SQ M-ARVRAT: 20 ML/MIN
GLUCOSE SERPL-MCNC: 104 MG/DL (ref 70–99)
POTASSIUM SERPL-SCNC: 4.4 MMOL/L (ref 3.4–5.1)
SODIUM SERPL-SCNC: 141 MMOL/L (ref 135–145)

## 2022-05-26 ENCOUNTER — LAB REQUISITION (OUTPATIENT)
Dept: LAB | Age: 87
End: 2022-05-26

## 2022-05-26 ENCOUNTER — LAB SERVICES (OUTPATIENT)
Dept: LAB | Age: 87
End: 2022-05-26

## 2022-05-26 DIAGNOSIS — N40.0 BENIGN PROSTATIC HYPERPLASIA WITHOUT LOWER URINARY TRACT SYMPTOMS: ICD-10-CM

## 2022-05-26 DIAGNOSIS — N18.4 CHRONIC KIDNEY DISEASE, STAGE 4 (SEVERE) (CMD): ICD-10-CM

## 2022-05-26 DIAGNOSIS — I12.9 HYPERTENSIVE CHRONIC KIDNEY DISEASE WITH STAGE 1 THROUGH STAGE 4 CHRONIC KIDNEY DISEASE, OR UNSPECIFIED CHRONIC KIDNEY DISEASE: ICD-10-CM

## 2022-05-26 DIAGNOSIS — D50.8 OTHER IRON DEFICIENCY ANEMIAS: ICD-10-CM

## 2022-05-26 DIAGNOSIS — D63.1 ANEMIA IN CHRONIC KIDNEY DISEASE (CODE): ICD-10-CM

## 2022-05-26 DIAGNOSIS — R80.9 PROTEINURIA, UNSPECIFIED: ICD-10-CM

## 2022-05-26 PROCEDURE — 82043 UR ALBUMIN QUANTITATIVE: CPT | Performed by: CLINICAL MEDICAL LABORATORY

## 2022-05-26 PROCEDURE — 81001 URINALYSIS AUTO W/SCOPE: CPT | Performed by: CLINICAL MEDICAL LABORATORY

## 2022-05-26 PROCEDURE — 84100 ASSAY OF PHOSPHORUS: CPT | Performed by: CLINICAL MEDICAL LABORATORY

## 2022-05-26 PROCEDURE — 82728 ASSAY OF FERRITIN: CPT | Performed by: CLINICAL MEDICAL LABORATORY

## 2022-05-26 PROCEDURE — 82570 ASSAY OF URINE CREATININE: CPT | Performed by: CLINICAL MEDICAL LABORATORY

## 2022-05-26 PROCEDURE — 83550 IRON BINDING TEST: CPT | Performed by: CLINICAL MEDICAL LABORATORY

## 2022-05-26 PROCEDURE — 80053 COMPREHEN METABOLIC PANEL: CPT | Performed by: CLINICAL MEDICAL LABORATORY

## 2022-05-26 PROCEDURE — 85025 COMPLETE CBC W/AUTO DIFF WBC: CPT | Performed by: CLINICAL MEDICAL LABORATORY

## 2022-05-26 PROCEDURE — 82306 VITAMIN D 25 HYDROXY: CPT | Performed by: CLINICAL MEDICAL LABORATORY

## 2022-05-26 PROCEDURE — 83540 ASSAY OF IRON: CPT | Performed by: CLINICAL MEDICAL LABORATORY

## 2022-05-27 LAB
25(OH)D3+25(OH)D2 SERPL-MCNC: 60.3 NG/ML (ref 30–100)
ALBUMIN SERPL-MCNC: 3.6 G/DL (ref 3.6–5.1)
ALBUMIN/GLOB SERPL: 1.3 {RATIO} (ref 1–2.4)
ALP SERPL-CCNC: 32 UNITS/L (ref 45–117)
ALT SERPL-CCNC: 16 UNITS/L
ANION GAP SERPL CALC-SCNC: 11 MMOL/L (ref 7–19)
APPEARANCE UR: CLEAR
AST SERPL-CCNC: 10 UNITS/L
BACTERIA #/AREA URNS HPF: ABNORMAL /HPF
BASOPHILS # BLD: 0 K/MCL (ref 0–0.3)
BASOPHILS NFR BLD: 0 %
BILIRUB SERPL-MCNC: 0.4 MG/DL (ref 0.2–1)
BILIRUB UR QL STRIP: NEGATIVE
BUN SERPL-MCNC: 64 MG/DL (ref 6–20)
BUN/CREAT SERPL: 24 (ref 7–25)
CALCIUM SERPL-MCNC: 9.8 MG/DL (ref 8.4–10.2)
CHLORIDE SERPL-SCNC: 112 MMOL/L (ref 97–110)
CO2 SERPL-SCNC: 21 MMOL/L (ref 21–32)
COLOR UR: ABNORMAL
CREAT SERPL-MCNC: 2.7 MG/DL (ref 0.67–1.17)
CREAT UR-MCNC: 34.3 MG/DL
DEPRECATED RDW RBC: 40 FL (ref 39–50)
EOSINOPHIL # BLD: 0.2 K/MCL (ref 0–0.5)
EOSINOPHIL NFR BLD: 2 %
ERYTHROCYTE [DISTWIDTH] IN BLOOD: 16.4 % (ref 11–15)
FASTING DURATION TIME PATIENT: ABNORMAL H
FERRITIN SERPL-MCNC: 244 NG/ML (ref 26–388)
GFR SERPLBLD BASED ON 1.73 SQ M-ARVRAT: 23 ML/MIN
GLOBULIN SER-MCNC: 2.8 G/DL (ref 2–4)
GLUCOSE SERPL-MCNC: 150 MG/DL (ref 70–99)
GLUCOSE UR STRIP-MCNC: ABNORMAL MG/DL
HCT VFR BLD CALC: 28.8 % (ref 39–51)
HGB BLD-MCNC: 8.9 G/DL (ref 13–17)
HGB UR QL STRIP: NEGATIVE
HYALINE CASTS #/AREA URNS LPF: ABNORMAL /LPF
IMM GRANULOCYTES # BLD AUTO: 0 K/MCL (ref 0–0.2)
IMM GRANULOCYTES # BLD: 0 %
IRON SATN MFR SERPL: 23 % (ref 15–45)
IRON SERPL-MCNC: 60 MCG/DL (ref 65–175)
KETONES UR STRIP-MCNC: NEGATIVE MG/DL
LEUKOCYTE ESTERASE UR QL STRIP: NEGATIVE
LYMPHOCYTES # BLD: 1.3 K/MCL (ref 1–4)
LYMPHOCYTES NFR BLD: 16 %
MCH RBC QN AUTO: 21.3 PG (ref 26–34)
MCHC RBC AUTO-ENTMCNC: 30.9 G/DL (ref 32–36.5)
MCV RBC AUTO: 68.9 FL (ref 78–100)
MICROALBUMIN UR-MCNC: 68.4 MG/DL
MICROALBUMIN/CREAT UR: 1994.2 MG/G
MONOCYTES # BLD: 0.8 K/MCL (ref 0.3–0.9)
MONOCYTES NFR BLD: 9 %
NEUTROPHILS # BLD: 5.8 K/MCL (ref 1.8–7.7)
NEUTROPHILS NFR BLD: 73 %
NITRITE UR QL STRIP: NEGATIVE
NRBC BLD MANUAL-RTO: 0 /100 WBC
PH UR STRIP: 6 [PH] (ref 5–7)
PHOSPHATE SERPL-MCNC: 3.8 MG/DL (ref 2.4–4.7)
PLATELET # BLD AUTO: 229 K/MCL (ref 140–450)
POTASSIUM SERPL-SCNC: 4.1 MMOL/L (ref 3.4–5.1)
PROT SERPL-MCNC: 6.4 G/DL (ref 6.4–8.2)
PROT UR STRIP-MCNC: 100 MG/DL
RBC # BLD: 4.18 MIL/MCL (ref 4.5–5.9)
RBC #/AREA URNS HPF: ABNORMAL /HPF
SODIUM SERPL-SCNC: 140 MMOL/L (ref 135–145)
SP GR UR STRIP: 1.01 (ref 1–1.03)
SQUAMOUS #/AREA URNS HPF: ABNORMAL /HPF
TIBC SERPL-MCNC: 258 MCG/DL (ref 250–450)
UROBILINOGEN UR STRIP-MCNC: 0.2 MG/DL
WBC # BLD: 8 K/MCL (ref 4.2–11)
WBC #/AREA URNS HPF: ABNORMAL /HPF

## 2022-06-02 ENCOUNTER — EXTERNAL RECORD (OUTPATIENT)
Dept: HEALTH INFORMATION MANAGEMENT | Facility: OTHER | Age: 87
End: 2022-06-02

## 2022-06-13 DIAGNOSIS — N18.4 ANEMIA IN STAGE 4 CHRONIC KIDNEY DISEASE (CMD): Primary | ICD-10-CM

## 2022-06-13 DIAGNOSIS — D63.1 ANEMIA IN STAGE 4 CHRONIC KIDNEY DISEASE (CMD): Primary | ICD-10-CM

## 2022-06-17 ENCOUNTER — CLINICAL ABSTRACT (OUTPATIENT)
Dept: HEALTH INFORMATION MANAGEMENT | Facility: OTHER | Age: 87
End: 2022-06-17

## 2022-06-17 ENCOUNTER — HOSPITAL ENCOUNTER (OUTPATIENT)
Dept: INFUSION THERAPY | Age: 87
Discharge: STILL ADMITTED OR ADMINISTRATIVE | End: 2022-06-17
Attending: INTERNAL MEDICINE

## 2022-06-17 VITALS
RESPIRATION RATE: 16 BRPM | HEART RATE: 71 BPM | OXYGEN SATURATION: 96 % | WEIGHT: 135.36 LBS | BODY MASS INDEX: 23.23 KG/M2 | SYSTOLIC BLOOD PRESSURE: 132 MMHG | TEMPERATURE: 97.9 F | DIASTOLIC BLOOD PRESSURE: 69 MMHG

## 2022-06-17 DIAGNOSIS — D63.1 ANEMIA IN STAGE 4 CHRONIC KIDNEY DISEASE (CMD): Primary | ICD-10-CM

## 2022-06-17 DIAGNOSIS — N18.4 ANEMIA IN STAGE 4 CHRONIC KIDNEY DISEASE (CMD): Primary | ICD-10-CM

## 2022-06-17 LAB
HCT VFR BLD CALC: 28.5 % (ref 39–51)
HGB BLD-MCNC: 8.8 G/DL (ref 13–17)

## 2022-06-17 PROCEDURE — 85018 HEMOGLOBIN: CPT | Performed by: INTERNAL MEDICINE

## 2022-06-17 PROCEDURE — 96372 THER/PROPH/DIAG INJ SC/IM: CPT

## 2022-06-17 PROCEDURE — 10002800 HB RX 250 W HCPCS: Performed by: INTERNAL MEDICINE

## 2022-06-17 RX ADMIN — EPOETIN ALFA-EPBX 10000 UNITS: 10000 INJECTION, SOLUTION INTRAVENOUS; SUBCUTANEOUS at 15:19

## 2022-06-17 ASSESSMENT — PAIN SCALES - GENERAL: PAINLEVEL_OUTOF10: 0

## 2022-06-20 ENCOUNTER — ANCILLARY PROCEDURE (OUTPATIENT)
Dept: CARDIOLOGY | Age: 87
End: 2022-06-20
Attending: INTERNAL MEDICINE

## 2022-06-20 DIAGNOSIS — I49.3 PVC'S (PREMATURE VENTRICULAR CONTRACTIONS): ICD-10-CM

## 2022-06-20 LAB
AORTIC VALVE AREA: 1.72
ASCENDING AORTA (AAD): 2.9
AV MEAN GRADIENT (AVMG): 12
AV MEAN VELOCITY (AVMV): 1.64
AV PEAK GRADIENT (AVPG): 24
AV PEAK VELOCITY (AVPV): 2.38
AV STENOSIS SEVERITY TEXT: NORMAL
DOP CALC LVOT PEAK VEL (LVOTPV): 0.93
E WAVE DECELARATION TIME (MDT): 309
EST RIGHT VENT SYSTOLIC PRESSURE BY TRICUSPID REGURGITATION JET (RVSP): 26.83
LEFT INTERNAL DIMENSION IN SYSTOLE (LVSD): 3.79
LEFT VENTRICULAR INTERNAL DIMENSION IN DIASTOLE (LVDD): 4.98
LV EF: NORMAL %
LVOT 2D (LVOTD): 2.3
LVOT VTI (LVOTVTI): 22.9
MV E TISSUE VEL LAT (MELV): 11.8
MV E TISSUE VEL MED (MESV): 5.27
MV E WAVE VEL/E TISSUE VEL MED(MSR): 19.73
TRICUSPID ANNULAR PLANE SYSTOLIC EXCURSION (TAPSE): 1.98
TRICUSPID VALVE ANNULAR PEAK VELOCITY (TVAPV): 8.07

## 2022-06-20 PROCEDURE — 93306 TTE W/DOPPLER COMPLETE: CPT | Performed by: INTERNAL MEDICINE

## 2022-06-23 ENCOUNTER — TELEPHONE (OUTPATIENT)
Dept: CARDIOLOGY | Age: 87
End: 2022-06-23

## 2022-07-01 ENCOUNTER — HOSPITAL ENCOUNTER (OUTPATIENT)
Dept: INFUSION THERAPY | Age: 87
Discharge: STILL ADMITTED OR ADMINISTRATIVE | End: 2022-07-01
Attending: INTERNAL MEDICINE

## 2022-07-01 VITALS
SYSTOLIC BLOOD PRESSURE: 121 MMHG | OXYGEN SATURATION: 96 % | WEIGHT: 136.47 LBS | BODY MASS INDEX: 23.42 KG/M2 | TEMPERATURE: 98.2 F | DIASTOLIC BLOOD PRESSURE: 63 MMHG | RESPIRATION RATE: 16 BRPM | HEART RATE: 70 BPM

## 2022-07-01 DIAGNOSIS — D63.1 ANEMIA IN STAGE 4 CHRONIC KIDNEY DISEASE (CMD): Primary | ICD-10-CM

## 2022-07-01 DIAGNOSIS — N18.4 ANEMIA IN STAGE 4 CHRONIC KIDNEY DISEASE (CMD): Primary | ICD-10-CM

## 2022-07-01 LAB
HCT VFR BLD CALC: 30.1 % (ref 39–51)
HGB BLD-MCNC: 9.2 G/DL (ref 13–17)

## 2022-07-01 PROCEDURE — 85018 HEMOGLOBIN: CPT | Performed by: INTERNAL MEDICINE

## 2022-07-01 PROCEDURE — 10002800 HB RX 250 W HCPCS: Performed by: INTERNAL MEDICINE

## 2022-07-01 PROCEDURE — 96372 THER/PROPH/DIAG INJ SC/IM: CPT

## 2022-07-01 RX ADMIN — EPOETIN ALFA-EPBX 10000 UNITS: 10000 INJECTION, SOLUTION INTRAVENOUS; SUBCUTANEOUS at 15:50

## 2022-07-01 ASSESSMENT — PAIN SCALES - GENERAL: PAINLEVEL_OUTOF10: 0

## 2022-07-15 ENCOUNTER — HOSPITAL ENCOUNTER (OUTPATIENT)
Dept: INFUSION THERAPY | Age: 87
Discharge: STILL ADMITTED OR ADMINISTRATIVE | End: 2022-07-15
Attending: INTERNAL MEDICINE

## 2022-07-15 ENCOUNTER — OFFICE VISIT (OUTPATIENT)
Dept: CARDIOLOGY | Age: 87
End: 2022-07-15

## 2022-07-15 ENCOUNTER — LAB REQUISITION (OUTPATIENT)
Dept: LAB | Age: 87
End: 2022-07-15

## 2022-07-15 VITALS
TEMPERATURE: 98.2 F | WEIGHT: 134.99 LBS | RESPIRATION RATE: 18 BRPM | SYSTOLIC BLOOD PRESSURE: 127 MMHG | OXYGEN SATURATION: 97 % | BODY MASS INDEX: 23.17 KG/M2 | DIASTOLIC BLOOD PRESSURE: 73 MMHG | HEART RATE: 61 BPM

## 2022-07-15 VITALS
WEIGHT: 135 LBS | DIASTOLIC BLOOD PRESSURE: 68 MMHG | BODY MASS INDEX: 23.05 KG/M2 | OXYGEN SATURATION: 98 % | SYSTOLIC BLOOD PRESSURE: 133 MMHG | HEART RATE: 56 BPM | HEIGHT: 64 IN

## 2022-07-15 DIAGNOSIS — I25.10 ATHEROSCLEROSIS OF NATIVE CORONARY ARTERY OF NATIVE HEART WITHOUT ANGINA PECTORIS: ICD-10-CM

## 2022-07-15 DIAGNOSIS — N18.4 CHRONIC KIDNEY DISEASE, STAGE 4 (SEVERE) (CMD): ICD-10-CM

## 2022-07-15 DIAGNOSIS — I10 PRIMARY HYPERTENSION: ICD-10-CM

## 2022-07-15 DIAGNOSIS — D63.1 ANEMIA IN STAGE 4 CHRONIC KIDNEY DISEASE (CMD): Primary | ICD-10-CM

## 2022-07-15 DIAGNOSIS — E78.00 PURE HYPERCHOLESTEROLEMIA: Primary | ICD-10-CM

## 2022-07-15 DIAGNOSIS — D63.1 ANEMIA IN CHRONIC KIDNEY DISEASE (CODE): ICD-10-CM

## 2022-07-15 DIAGNOSIS — N18.4 ANEMIA IN STAGE 4 CHRONIC KIDNEY DISEASE (CMD): Primary | ICD-10-CM

## 2022-07-15 LAB
HCT VFR BLD CALC: 32.3 % (ref 39–51)
HGB BLD-MCNC: 9.9 G/DL (ref 13–17)

## 2022-07-15 PROCEDURE — 96372 THER/PROPH/DIAG INJ SC/IM: CPT

## 2022-07-15 PROCEDURE — 3075F SYST BP GE 130 - 139MM HG: CPT | Performed by: INTERNAL MEDICINE

## 2022-07-15 PROCEDURE — 99214 OFFICE O/P EST MOD 30 MIN: CPT | Performed by: INTERNAL MEDICINE

## 2022-07-15 PROCEDURE — 85018 HEMOGLOBIN: CPT | Performed by: CLINICAL MEDICAL LABORATORY

## 2022-07-15 PROCEDURE — 3078F DIAST BP <80 MM HG: CPT | Performed by: INTERNAL MEDICINE

## 2022-07-15 PROCEDURE — 10002800 HB RX 250 W HCPCS: Performed by: INTERNAL MEDICINE

## 2022-07-15 PROCEDURE — 85014 HEMATOCRIT: CPT | Performed by: CLINICAL MEDICAL LABORATORY

## 2022-07-15 RX ORDER — DARBEPOETIN ALFA 60 UG/.3ML
INJECTION, SOLUTION INTRAVENOUS; SUBCUTANEOUS PRN
COMMUNITY

## 2022-07-15 RX ADMIN — EPOETIN ALFA-EPBX 10000 UNITS: 10000 INJECTION, SOLUTION INTRAVENOUS; SUBCUTANEOUS at 15:04

## 2022-07-15 ASSESSMENT — PAIN SCALES - GENERAL
PAINLEVEL: 0
PAINLEVEL_OUTOF10: 0

## 2022-07-15 ASSESSMENT — ENCOUNTER SYMPTOMS
PSYCHIATRIC NEGATIVE: 1
EYES NEGATIVE: 1
ENDOCRINE NEGATIVE: 1
RESPIRATORY NEGATIVE: 1
GASTROINTESTINAL NEGATIVE: 1
HEMATOLOGIC/LYMPHATIC NEGATIVE: 1
NEUROLOGICAL NEGATIVE: 1
CONSTITUTIONAL NEGATIVE: 1

## 2022-07-15 ASSESSMENT — PATIENT HEALTH QUESTIONNAIRE - PHQ9
2. FEELING DOWN, DEPRESSED OR HOPELESS: NOT AT ALL
1. LITTLE INTEREST OR PLEASURE IN DOING THINGS: NOT AT ALL
SUM OF ALL RESPONSES TO PHQ9 QUESTIONS 1 AND 2: 0
SUM OF ALL RESPONSES TO PHQ9 QUESTIONS 1 AND 2: 0
CLINICAL INTERPRETATION OF PHQ2 SCORE: NO FURTHER SCREENING NEEDED

## 2022-07-29 ENCOUNTER — LAB REQUISITION (OUTPATIENT)
Dept: LAB | Age: 87
End: 2022-07-29

## 2022-07-29 ENCOUNTER — HOSPITAL ENCOUNTER (OUTPATIENT)
Dept: INFUSION THERAPY | Age: 87
Discharge: STILL ADMITTED OR ADMINISTRATIVE | End: 2022-07-29
Attending: INTERNAL MEDICINE

## 2022-07-29 VITALS
OXYGEN SATURATION: 97 % | SYSTOLIC BLOOD PRESSURE: 135 MMHG | HEART RATE: 63 BPM | TEMPERATURE: 97.9 F | DIASTOLIC BLOOD PRESSURE: 73 MMHG | BODY MASS INDEX: 23.35 KG/M2 | WEIGHT: 136.02 LBS | RESPIRATION RATE: 18 BRPM

## 2022-07-29 DIAGNOSIS — D63.1 ANEMIA IN STAGE 4 CHRONIC KIDNEY DISEASE (CMD): Primary | ICD-10-CM

## 2022-07-29 DIAGNOSIS — D63.1 ANEMIA IN CHRONIC KIDNEY DISEASE (CODE): ICD-10-CM

## 2022-07-29 DIAGNOSIS — N18.4 ANEMIA IN STAGE 4 CHRONIC KIDNEY DISEASE (CMD): Primary | ICD-10-CM

## 2022-07-29 DIAGNOSIS — N18.4 CHRONIC KIDNEY DISEASE, STAGE 4 (SEVERE) (CMD): ICD-10-CM

## 2022-07-29 LAB
HCT VFR BLD CALC: 31.2 % (ref 39–51)
HGB BLD-MCNC: 9.7 G/DL (ref 13–17)

## 2022-07-29 PROCEDURE — 85014 HEMATOCRIT: CPT | Performed by: CLINICAL MEDICAL LABORATORY

## 2022-07-29 PROCEDURE — 10002800 HB RX 250 W HCPCS: Performed by: INTERNAL MEDICINE

## 2022-07-29 PROCEDURE — 96372 THER/PROPH/DIAG INJ SC/IM: CPT

## 2022-07-29 PROCEDURE — 85018 HEMOGLOBIN: CPT | Performed by: CLINICAL MEDICAL LABORATORY

## 2022-07-29 RX ADMIN — EPOETIN ALFA-EPBX 10000 UNITS: 10000 INJECTION, SOLUTION INTRAVENOUS; SUBCUTANEOUS at 14:00

## 2022-07-29 ASSESSMENT — PAIN SCALES - GENERAL: PAINLEVEL_OUTOF10: 0

## 2022-08-12 ENCOUNTER — HOSPITAL ENCOUNTER (OUTPATIENT)
Dept: INFUSION THERAPY | Age: 87
Discharge: STILL ADMITTED OR ADMINISTRATIVE | End: 2022-08-12
Attending: INTERNAL MEDICINE

## 2022-08-12 ENCOUNTER — LAB REQUISITION (OUTPATIENT)
Dept: LAB | Age: 87
End: 2022-08-12

## 2022-08-12 VITALS
HEART RATE: 71 BPM | TEMPERATURE: 98.2 F | BODY MASS INDEX: 23.35 KG/M2 | WEIGHT: 136.02 LBS | DIASTOLIC BLOOD PRESSURE: 67 MMHG | OXYGEN SATURATION: 99 % | SYSTOLIC BLOOD PRESSURE: 145 MMHG | RESPIRATION RATE: 18 BRPM

## 2022-08-12 DIAGNOSIS — D63.1 ANEMIA IN STAGE 4 CHRONIC KIDNEY DISEASE (CMD): Primary | ICD-10-CM

## 2022-08-12 DIAGNOSIS — D63.1 ANEMIA IN CHRONIC KIDNEY DISEASE (CODE): ICD-10-CM

## 2022-08-12 DIAGNOSIS — N18.4 ANEMIA IN STAGE 4 CHRONIC KIDNEY DISEASE (CMD): Primary | ICD-10-CM

## 2022-08-12 DIAGNOSIS — N18.4 CHRONIC KIDNEY DISEASE, STAGE 4 (SEVERE) (CMD): ICD-10-CM

## 2022-08-12 LAB
HCT VFR BLD CALC: 32.3 % (ref 39–51)
HGB BLD-MCNC: 10.1 G/DL (ref 13–17)

## 2022-08-12 PROCEDURE — 85018 HEMOGLOBIN: CPT | Performed by: CLINICAL MEDICAL LABORATORY

## 2022-08-12 PROCEDURE — 96372 THER/PROPH/DIAG INJ SC/IM: CPT

## 2022-08-12 PROCEDURE — 85014 HEMATOCRIT: CPT | Performed by: CLINICAL MEDICAL LABORATORY

## 2022-08-12 PROCEDURE — 10002800 HB RX 250 W HCPCS: Performed by: INTERNAL MEDICINE

## 2022-08-12 PROCEDURE — 36415 COLL VENOUS BLD VENIPUNCTURE: CPT | Performed by: CLINICAL MEDICAL LABORATORY

## 2022-08-12 RX ADMIN — EPOETIN ALFA-EPBX 10000 UNITS: 10000 INJECTION, SOLUTION INTRAVENOUS; SUBCUTANEOUS at 14:45

## 2022-08-12 ASSESSMENT — PAIN SCALES - GENERAL: PAINLEVEL_OUTOF10: 0

## 2022-08-22 ENCOUNTER — IMAGING SERVICES (OUTPATIENT)
Dept: ULTRASOUND IMAGING | Age: 87
End: 2022-08-22
Attending: INTERNAL MEDICINE

## 2022-08-22 DIAGNOSIS — E04.1 THYROID NODULE: ICD-10-CM

## 2022-08-22 PROCEDURE — 76536 US EXAM OF HEAD AND NECK: CPT | Performed by: RADIOLOGY

## 2022-08-26 ENCOUNTER — LAB REQUISITION (OUTPATIENT)
Dept: LAB | Age: 87
End: 2022-08-26

## 2022-08-26 ENCOUNTER — HOSPITAL ENCOUNTER (OUTPATIENT)
Dept: INFUSION THERAPY | Age: 87
Discharge: STILL ADMITTED OR ADMINISTRATIVE | End: 2022-08-26
Attending: INTERNAL MEDICINE

## 2022-08-26 VITALS
BODY MASS INDEX: 23.39 KG/M2 | RESPIRATION RATE: 18 BRPM | HEART RATE: 65 BPM | OXYGEN SATURATION: 97 % | TEMPERATURE: 98.2 F | SYSTOLIC BLOOD PRESSURE: 137 MMHG | DIASTOLIC BLOOD PRESSURE: 65 MMHG | WEIGHT: 136.24 LBS

## 2022-08-26 DIAGNOSIS — D63.1 ANEMIA IN STAGE 4 CHRONIC KIDNEY DISEASE (CMD): Primary | ICD-10-CM

## 2022-08-26 DIAGNOSIS — N18.4 CHRONIC KIDNEY DISEASE, STAGE 4 (SEVERE) (CMD): ICD-10-CM

## 2022-08-26 DIAGNOSIS — D63.1 ANEMIA IN CHRONIC KIDNEY DISEASE (CODE): ICD-10-CM

## 2022-08-26 DIAGNOSIS — N18.4 ANEMIA IN STAGE 4 CHRONIC KIDNEY DISEASE (CMD): Primary | ICD-10-CM

## 2022-08-26 LAB
HCT VFR BLD CALC: 32.7 % (ref 39–51)
HGB BLD-MCNC: 9.9 G/DL (ref 13–17)

## 2022-08-26 PROCEDURE — 10002800 HB RX 250 W HCPCS: Performed by: INTERNAL MEDICINE

## 2022-08-26 PROCEDURE — 85018 HEMOGLOBIN: CPT | Performed by: CLINICAL MEDICAL LABORATORY

## 2022-08-26 PROCEDURE — 85014 HEMATOCRIT: CPT | Performed by: CLINICAL MEDICAL LABORATORY

## 2022-08-26 PROCEDURE — 96372 THER/PROPH/DIAG INJ SC/IM: CPT

## 2022-08-26 RX ADMIN — EPOETIN ALFA-EPBX 10000 UNITS: 10000 INJECTION, SOLUTION INTRAVENOUS; SUBCUTANEOUS at 15:26

## 2022-08-26 ASSESSMENT — PAIN SCALES - GENERAL: PAINLEVEL_OUTOF10: 0

## 2022-09-09 ENCOUNTER — HOSPITAL ENCOUNTER (OUTPATIENT)
Dept: INFUSION THERAPY | Age: 87
Discharge: STILL ADMITTED OR ADMINISTRATIVE | End: 2022-09-09
Attending: INTERNAL MEDICINE

## 2022-09-09 ENCOUNTER — LAB REQUISITION (OUTPATIENT)
Dept: LAB | Age: 87
End: 2022-09-09

## 2022-09-09 VITALS
OXYGEN SATURATION: 94 % | DIASTOLIC BLOOD PRESSURE: 73 MMHG | TEMPERATURE: 98.4 F | SYSTOLIC BLOOD PRESSURE: 135 MMHG | WEIGHT: 134.92 LBS | BODY MASS INDEX: 23.16 KG/M2 | RESPIRATION RATE: 16 BRPM | HEART RATE: 65 BPM

## 2022-09-09 DIAGNOSIS — N18.4 CHRONIC KIDNEY DISEASE, STAGE 4 (SEVERE) (CMD): ICD-10-CM

## 2022-09-09 DIAGNOSIS — N18.4 ANEMIA IN STAGE 4 CHRONIC KIDNEY DISEASE (CMD): Primary | ICD-10-CM

## 2022-09-09 DIAGNOSIS — D63.1 ANEMIA IN STAGE 4 CHRONIC KIDNEY DISEASE (CMD): Primary | ICD-10-CM

## 2022-09-09 LAB
HCT VFR BLD CALC: 32.8 % (ref 39–51)
HGB BLD-MCNC: 10.1 G/DL (ref 13–17)

## 2022-09-09 PROCEDURE — 85014 HEMATOCRIT: CPT | Performed by: CLINICAL MEDICAL LABORATORY

## 2022-09-09 PROCEDURE — 96372 THER/PROPH/DIAG INJ SC/IM: CPT

## 2022-09-09 PROCEDURE — 85018 HEMOGLOBIN: CPT | Performed by: CLINICAL MEDICAL LABORATORY

## 2022-09-09 PROCEDURE — 10002800 HB RX 250 W HCPCS: Performed by: INTERNAL MEDICINE

## 2022-09-09 RX ADMIN — EPOETIN ALFA-EPBX 10000 UNITS: 10000 INJECTION, SOLUTION INTRAVENOUS; SUBCUTANEOUS at 14:50

## 2022-09-09 ASSESSMENT — PAIN SCALES - GENERAL: PAINLEVEL_OUTOF10: 0

## 2022-09-15 ENCOUNTER — LAB REQUISITION (OUTPATIENT)
Dept: LAB | Age: 87
End: 2022-09-15

## 2022-09-15 ENCOUNTER — IMAGING SERVICES (OUTPATIENT)
Dept: ULTRASOUND IMAGING | Age: 87
End: 2022-09-15
Attending: INTERNAL MEDICINE

## 2022-09-15 ENCOUNTER — LAB SERVICES (OUTPATIENT)
Dept: LAB | Age: 87
End: 2022-09-15

## 2022-09-15 DIAGNOSIS — N18.4 CHRONIC KIDNEY DISEASE, STAGE 4 (SEVERE) (CMD): ICD-10-CM

## 2022-09-15 DIAGNOSIS — E78.5 HYPERLIPIDEMIA, UNSPECIFIED: ICD-10-CM

## 2022-09-15 DIAGNOSIS — D63.1 ANEMIA IN CHRONIC KIDNEY DISEASE (CODE): ICD-10-CM

## 2022-09-15 DIAGNOSIS — I12.9 HYPERTENSIVE CHRONIC KIDNEY DISEASE WITH STAGE 1 THROUGH STAGE 4 CHRONIC KIDNEY DISEASE, OR UNSPECIFIED CHRONIC KIDNEY DISEASE: ICD-10-CM

## 2022-09-15 DIAGNOSIS — N40.0 BENIGN PROSTATIC HYPERPLASIA WITHOUT LOWER URINARY TRACT SYMPTOMS: ICD-10-CM

## 2022-09-15 DIAGNOSIS — R80.9 PROTEINURIA, UNSPECIFIED: ICD-10-CM

## 2022-09-15 DIAGNOSIS — D50.8 OTHER IRON DEFICIENCY ANEMIAS: ICD-10-CM

## 2022-09-15 DIAGNOSIS — D17.0 LIPOMA OF NECK: ICD-10-CM

## 2022-09-15 LAB
25(OH)D3+25(OH)D2 SERPL-MCNC: 62.2 NG/ML (ref 30–100)
ALBUMIN SERPL-MCNC: 4 G/DL (ref 3.6–5.1)
ALBUMIN/GLOB SERPL: 1.3 {RATIO} (ref 1–2.4)
ALP SERPL-CCNC: 36 UNITS/L (ref 45–117)
ALT SERPL-CCNC: 18 UNITS/L
ANION GAP SERPL CALC-SCNC: 16 MMOL/L (ref 7–19)
AST SERPL-CCNC: 12 UNITS/L
BASOPHILS # BLD: 0 K/MCL (ref 0–0.3)
BASOPHILS NFR BLD: 0 %
BILIRUB SERPL-MCNC: 0.6 MG/DL (ref 0.2–1)
BUN SERPL-MCNC: 73 MG/DL (ref 6–20)
BUN/CREAT SERPL: 22 (ref 7–25)
CALCIUM SERPL-MCNC: 10.1 MG/DL (ref 8.4–10.2)
CHLORIDE SERPL-SCNC: 107 MMOL/L (ref 97–110)
CO2 SERPL-SCNC: 22 MMOL/L (ref 21–32)
CREAT SERPL-MCNC: 3.25 MG/DL (ref 0.67–1.17)
DEPRECATED RDW RBC: 40.3 FL (ref 39–50)
EOSINOPHIL # BLD: 0.1 K/MCL (ref 0–0.5)
EOSINOPHIL NFR BLD: 1 %
ERYTHROCYTE [DISTWIDTH] IN BLOOD: 17.7 % (ref 11–15)
FASTING DURATION TIME PATIENT: ABNORMAL H
FERRITIN SERPL-MCNC: 186 NG/ML (ref 26–388)
GFR SERPLBLD BASED ON 1.73 SQ M-ARVRAT: 18 ML/MIN
GLOBULIN SER-MCNC: 3 G/DL (ref 2–4)
GLUCOSE SERPL-MCNC: 129 MG/DL (ref 70–99)
HCT VFR BLD CALC: 34.5 % (ref 39–51)
HGB BLD-MCNC: 10.5 G/DL (ref 13–17)
IMM GRANULOCYTES # BLD AUTO: 0 K/MCL (ref 0–0.2)
IMM GRANULOCYTES # BLD: 0 %
IRON SATN MFR SERPL: 23 % (ref 15–45)
IRON SERPL-MCNC: 63 MCG/DL (ref 65–175)
LYMPHOCYTES # BLD: 1.4 K/MCL (ref 1–4)
LYMPHOCYTES NFR BLD: 18 %
MCH RBC QN AUTO: 20.8 PG (ref 26–34)
MCHC RBC AUTO-ENTMCNC: 30.4 G/DL (ref 32–36.5)
MCV RBC AUTO: 68.2 FL (ref 78–100)
MONOCYTES # BLD: 0.8 K/MCL (ref 0.3–0.9)
MONOCYTES NFR BLD: 10 %
NEUTROPHILS # BLD: 5.7 K/MCL (ref 1.8–7.7)
NEUTROPHILS NFR BLD: 71 %
NRBC BLD MANUAL-RTO: 0 /100 WBC
PHOSPHATE SERPL-MCNC: 3.6 MG/DL (ref 2.4–4.7)
PLATELET # BLD AUTO: 249 K/MCL (ref 140–450)
POTASSIUM SERPL-SCNC: 4 MMOL/L (ref 3.4–5.1)
PROT SERPL-MCNC: 7 G/DL (ref 6.4–8.2)
RBC # BLD: 5.06 MIL/MCL (ref 4.5–5.9)
SODIUM SERPL-SCNC: 141 MMOL/L (ref 135–145)
TIBC SERPL-MCNC: 270 MCG/DL (ref 250–450)
WBC # BLD: 8.1 K/MCL (ref 4.2–11)

## 2022-09-15 PROCEDURE — 82570 ASSAY OF URINE CREATININE: CPT | Performed by: CLINICAL MEDICAL LABORATORY

## 2022-09-15 PROCEDURE — 82043 UR ALBUMIN QUANTITATIVE: CPT | Performed by: CLINICAL MEDICAL LABORATORY

## 2022-09-15 PROCEDURE — 82728 ASSAY OF FERRITIN: CPT | Performed by: CLINICAL MEDICAL LABORATORY

## 2022-09-15 PROCEDURE — 83550 IRON BINDING TEST: CPT | Performed by: CLINICAL MEDICAL LABORATORY

## 2022-09-15 PROCEDURE — 84100 ASSAY OF PHOSPHORUS: CPT | Performed by: CLINICAL MEDICAL LABORATORY

## 2022-09-15 PROCEDURE — 82306 VITAMIN D 25 HYDROXY: CPT | Performed by: CLINICAL MEDICAL LABORATORY

## 2022-09-15 PROCEDURE — 85025 COMPLETE CBC W/AUTO DIFF WBC: CPT | Performed by: CLINICAL MEDICAL LABORATORY

## 2022-09-15 PROCEDURE — 81001 URINALYSIS AUTO W/SCOPE: CPT | Performed by: CLINICAL MEDICAL LABORATORY

## 2022-09-15 PROCEDURE — 80053 COMPREHEN METABOLIC PANEL: CPT | Performed by: CLINICAL MEDICAL LABORATORY

## 2022-09-15 PROCEDURE — 83540 ASSAY OF IRON: CPT | Performed by: CLINICAL MEDICAL LABORATORY

## 2022-09-15 PROCEDURE — 76536 US EXAM OF HEAD AND NECK: CPT | Performed by: RADIOLOGY

## 2022-09-16 LAB
APPEARANCE UR: CLEAR
BACTERIA #/AREA URNS HPF: ABNORMAL /HPF
BILIRUB UR QL STRIP: NEGATIVE
COLOR UR: YELLOW
CREAT UR-MCNC: 102 MG/DL
GLUCOSE UR STRIP-MCNC: NEGATIVE MG/DL
HGB UR QL STRIP: NEGATIVE
HYALINE CASTS #/AREA URNS LPF: ABNORMAL /LPF
KETONES UR STRIP-MCNC: NEGATIVE MG/DL
LEUKOCYTE ESTERASE UR QL STRIP: NEGATIVE
MICROALBUMIN UR-MCNC: 162 MG/DL
MICROALBUMIN/CREAT UR: 1588.2 MG/G
MUCOUS THREADS URNS QL MICRO: PRESENT
NITRITE UR QL STRIP: NEGATIVE
PH UR STRIP: 5 [PH] (ref 5–7)
PROT UR STRIP-MCNC: >=500 MG/DL
RBC #/AREA URNS HPF: ABNORMAL /HPF
SP GR UR STRIP: 1.01 (ref 1–1.03)
SQUAMOUS #/AREA URNS HPF: ABNORMAL /HPF
UROBILINOGEN UR STRIP-MCNC: 0.2 MG/DL
WBC #/AREA URNS HPF: ABNORMAL /HPF

## 2022-09-23 ENCOUNTER — HOSPITAL ENCOUNTER (OUTPATIENT)
Dept: INFUSION THERAPY | Age: 87
Discharge: STILL ADMITTED OR ADMINISTRATIVE | End: 2022-09-23
Attending: INTERNAL MEDICINE

## 2022-09-23 ENCOUNTER — LAB REQUISITION (OUTPATIENT)
Dept: LAB | Age: 87
End: 2022-09-23

## 2022-09-23 VITALS
WEIGHT: 135.58 LBS | DIASTOLIC BLOOD PRESSURE: 73 MMHG | HEART RATE: 65 BPM | OXYGEN SATURATION: 97 % | TEMPERATURE: 98.4 F | RESPIRATION RATE: 18 BRPM | SYSTOLIC BLOOD PRESSURE: 144 MMHG | BODY MASS INDEX: 23.27 KG/M2

## 2022-09-23 DIAGNOSIS — N18.4 ANEMIA IN STAGE 4 CHRONIC KIDNEY DISEASE (CMD): Primary | ICD-10-CM

## 2022-09-23 DIAGNOSIS — D63.1 ANEMIA IN CHRONIC KIDNEY DISEASE (CODE): ICD-10-CM

## 2022-09-23 DIAGNOSIS — I12.9 HYPERTENSIVE CHRONIC KIDNEY DISEASE WITH STAGE 1 THROUGH STAGE 4 CHRONIC KIDNEY DISEASE, OR UNSPECIFIED CHRONIC KIDNEY DISEASE: ICD-10-CM

## 2022-09-23 DIAGNOSIS — D63.1 ANEMIA IN STAGE 4 CHRONIC KIDNEY DISEASE (CMD): Primary | ICD-10-CM

## 2022-09-23 DIAGNOSIS — D50.8 OTHER IRON DEFICIENCY ANEMIAS: ICD-10-CM

## 2022-09-23 DIAGNOSIS — E78.5 HYPERLIPIDEMIA, UNSPECIFIED: ICD-10-CM

## 2022-09-23 DIAGNOSIS — N18.4 CHRONIC KIDNEY DISEASE, STAGE 4 (SEVERE) (CMD): ICD-10-CM

## 2022-09-23 LAB
25(OH)D3+25(OH)D2 SERPL-MCNC: 58.7 NG/ML (ref 30–100)
ALBUMIN SERPL-MCNC: 3.8 G/DL (ref 3.6–5.1)
ALBUMIN/GLOB SERPL: 1.3 {RATIO} (ref 1–2.4)
ALP SERPL-CCNC: 33 UNITS/L (ref 45–117)
ALT SERPL-CCNC: 14 UNITS/L
ANION GAP SERPL CALC-SCNC: 12 MMOL/L (ref 7–19)
APPEARANCE UR: CLEAR
AST SERPL-CCNC: 15 UNITS/L
BACTERIA #/AREA URNS HPF: ABNORMAL /HPF
BASOPHILS # BLD: 0 K/MCL (ref 0–0.3)
BASOPHILS NFR BLD: 0 %
BILIRUB SERPL-MCNC: 0.5 MG/DL (ref 0.2–1)
BILIRUB UR QL STRIP: NEGATIVE
BUN SERPL-MCNC: 60 MG/DL (ref 6–20)
BUN/CREAT SERPL: 21 (ref 7–25)
CALCIUM SERPL-MCNC: 9.5 MG/DL (ref 8.4–10.2)
CHLORIDE SERPL-SCNC: 105 MMOL/L (ref 97–110)
CO2 SERPL-SCNC: 27 MMOL/L (ref 21–32)
COLOR UR: COLORLESS
CREAT SERPL-MCNC: 2.91 MG/DL (ref 0.67–1.17)
CREAT UR-MCNC: 29.3 MG/DL
CREAT UR-MCNC: 29.5 MG/DL
DEPRECATED RDW RBC: 40.8 FL (ref 39–50)
EOSINOPHIL # BLD: 0.1 K/MCL (ref 0–0.5)
EOSINOPHIL NFR BLD: 1 %
ERYTHROCYTE [DISTWIDTH] IN BLOOD: 16.8 % (ref 11–15)
FASTING DURATION TIME PATIENT: ABNORMAL H
FERRITIN SERPL-MCNC: 224 NG/ML (ref 26–388)
GFR SERPLBLD BASED ON 1.73 SQ M-ARVRAT: 21 ML/MIN
GLOBULIN SER-MCNC: 3 G/DL (ref 2–4)
GLUCOSE SERPL-MCNC: 108 MG/DL (ref 70–99)
GLUCOSE UR STRIP-MCNC: NEGATIVE MG/DL
HCT VFR BLD CALC: 33.7 % (ref 39–51)
HGB BLD-MCNC: 10 G/DL (ref 13–17)
HGB UR QL STRIP: NEGATIVE
HYALINE CASTS #/AREA URNS LPF: ABNORMAL /LPF
IMM GRANULOCYTES # BLD AUTO: 0 K/MCL (ref 0–0.2)
IMM GRANULOCYTES # BLD: 0 %
IRON SATN MFR SERPL: 32 % (ref 15–45)
IRON SERPL-MCNC: 79 MCG/DL (ref 65–175)
KETONES UR STRIP-MCNC: NEGATIVE MG/DL
LEUKOCYTE ESTERASE UR QL STRIP: NEGATIVE
LYMPHOCYTES # BLD: 1.2 K/MCL (ref 1–4)
LYMPHOCYTES NFR BLD: 16 %
MCH RBC QN AUTO: 20.4 PG (ref 26–34)
MCHC RBC AUTO-ENTMCNC: 29.7 G/DL (ref 32–36.5)
MCV RBC AUTO: 68.8 FL (ref 78–100)
MICROALBUMIN UR-MCNC: 50 MG/DL
MICROALBUMIN/CREAT UR: 1706.5 MG/G
MONOCYTES # BLD: 0.9 K/MCL (ref 0.3–0.9)
MONOCYTES NFR BLD: 12 %
NEUTROPHILS # BLD: 5.5 K/MCL (ref 1.8–7.7)
NEUTROPHILS NFR BLD: 71 %
NITRITE UR QL STRIP: NEGATIVE
NRBC BLD MANUAL-RTO: 0 /100 WBC
PH UR STRIP: 5.5 [PH] (ref 5–7)
PHOSPHATE SERPL-MCNC: 3.7 MG/DL (ref 2.4–4.7)
PLATELET # BLD AUTO: 211 K/MCL (ref 140–450)
POTASSIUM SERPL-SCNC: 4.6 MMOL/L (ref 3.4–5.1)
PROT SERPL-MCNC: 6.8 G/DL (ref 6.4–8.2)
PROT UR STRIP-MCNC: 30 MG/DL
PROT UR-MCNC: 63 MG/DL
PROT/CREAT UR: 2136 MGPR/GCR
RBC # BLD: 4.9 MIL/MCL (ref 4.5–5.9)
RBC #/AREA URNS HPF: ABNORMAL /HPF
SODIUM SERPL-SCNC: 139 MMOL/L (ref 135–145)
SP GR UR STRIP: 1.01 (ref 1–1.03)
SQUAMOUS #/AREA URNS HPF: ABNORMAL /HPF
TIBC SERPL-MCNC: 247 MCG/DL (ref 250–450)
UROBILINOGEN UR STRIP-MCNC: 0.2 MG/DL
WBC # BLD: 7.7 K/MCL (ref 4.2–11)
WBC #/AREA URNS HPF: ABNORMAL /HPF

## 2022-09-23 PROCEDURE — 83550 IRON BINDING TEST: CPT | Performed by: CLINICAL MEDICAL LABORATORY

## 2022-09-23 PROCEDURE — 82728 ASSAY OF FERRITIN: CPT | Performed by: CLINICAL MEDICAL LABORATORY

## 2022-09-23 PROCEDURE — 96372 THER/PROPH/DIAG INJ SC/IM: CPT

## 2022-09-23 PROCEDURE — 82043 UR ALBUMIN QUANTITATIVE: CPT | Performed by: CLINICAL MEDICAL LABORATORY

## 2022-09-23 PROCEDURE — 82306 VITAMIN D 25 HYDROXY: CPT | Performed by: CLINICAL MEDICAL LABORATORY

## 2022-09-23 PROCEDURE — 10002800 HB RX 250 W HCPCS: Performed by: INTERNAL MEDICINE

## 2022-09-23 PROCEDURE — 85025 COMPLETE CBC W/AUTO DIFF WBC: CPT | Performed by: CLINICAL MEDICAL LABORATORY

## 2022-09-23 PROCEDURE — 84100 ASSAY OF PHOSPHORUS: CPT | Performed by: CLINICAL MEDICAL LABORATORY

## 2022-09-23 PROCEDURE — 82570 ASSAY OF URINE CREATININE: CPT | Performed by: CLINICAL MEDICAL LABORATORY

## 2022-09-23 PROCEDURE — 83540 ASSAY OF IRON: CPT | Performed by: CLINICAL MEDICAL LABORATORY

## 2022-09-23 PROCEDURE — 84156 ASSAY OF PROTEIN URINE: CPT | Performed by: CLINICAL MEDICAL LABORATORY

## 2022-09-23 PROCEDURE — 80053 COMPREHEN METABOLIC PANEL: CPT | Performed by: CLINICAL MEDICAL LABORATORY

## 2022-09-23 PROCEDURE — 81001 URINALYSIS AUTO W/SCOPE: CPT | Performed by: CLINICAL MEDICAL LABORATORY

## 2022-09-23 RX ADMIN — EPOETIN ALFA-EPBX 10000 UNITS: 10000 INJECTION, SOLUTION INTRAVENOUS; SUBCUTANEOUS at 14:08

## 2022-09-23 ASSESSMENT — PAIN SCALES - GENERAL: PAINLEVEL_OUTOF10: 0

## 2022-10-07 ENCOUNTER — APPOINTMENT (OUTPATIENT)
Dept: INFUSION THERAPY | Age: 87
End: 2022-10-07
Attending: INTERNAL MEDICINE

## 2022-10-21 ENCOUNTER — HOSPITAL ENCOUNTER (OUTPATIENT)
Dept: INFUSION THERAPY | Age: 87
Discharge: STILL ADMITTED OR ADMINISTRATIVE | End: 2022-10-21
Attending: INTERNAL MEDICINE

## 2022-10-21 ENCOUNTER — LAB REQUISITION (OUTPATIENT)
Dept: LAB | Age: 87
End: 2022-10-21

## 2022-10-21 VITALS
HEART RATE: 68 BPM | DIASTOLIC BLOOD PRESSURE: 69 MMHG | RESPIRATION RATE: 16 BRPM | SYSTOLIC BLOOD PRESSURE: 124 MMHG | OXYGEN SATURATION: 97 % | WEIGHT: 137.57 LBS | BODY MASS INDEX: 23.61 KG/M2 | TEMPERATURE: 98.2 F

## 2022-10-21 DIAGNOSIS — D63.1 ANEMIA IN STAGE 4 CHRONIC KIDNEY DISEASE (CMD): Primary | ICD-10-CM

## 2022-10-21 DIAGNOSIS — N18.4 CHRONIC KIDNEY DISEASE, STAGE 4 (SEVERE) (CMD): ICD-10-CM

## 2022-10-21 DIAGNOSIS — N18.4 ANEMIA IN STAGE 4 CHRONIC KIDNEY DISEASE (CMD): Primary | ICD-10-CM

## 2022-10-21 LAB
HCT VFR BLD CALC: 31 % (ref 39–51)
HGB BLD-MCNC: 9.4 G/DL (ref 13–17)

## 2022-10-21 PROCEDURE — 85018 HEMOGLOBIN: CPT | Performed by: CLINICAL MEDICAL LABORATORY

## 2022-10-21 PROCEDURE — 96372 THER/PROPH/DIAG INJ SC/IM: CPT

## 2022-10-21 PROCEDURE — 85014 HEMATOCRIT: CPT | Performed by: CLINICAL MEDICAL LABORATORY

## 2022-10-21 PROCEDURE — 10002800 HB RX 250 W HCPCS: Performed by: INTERNAL MEDICINE

## 2022-10-21 RX ADMIN — EPOETIN ALFA-EPBX 10000 UNITS: 10000 INJECTION, SOLUTION INTRAVENOUS; SUBCUTANEOUS at 11:28

## 2022-10-21 ASSESSMENT — PAIN SCALES - GENERAL: PAINLEVEL_OUTOF10: 0

## 2022-11-04 ENCOUNTER — HOSPITAL ENCOUNTER (OUTPATIENT)
Dept: INFUSION THERAPY | Age: 87
Discharge: STILL ADMITTED OR ADMINISTRATIVE | End: 2022-11-04
Attending: INTERNAL MEDICINE

## 2022-11-04 ENCOUNTER — LAB REQUISITION (OUTPATIENT)
Dept: LAB | Age: 87
End: 2022-11-04

## 2022-11-04 VITALS — TEMPERATURE: 97.7 F

## 2022-11-04 DIAGNOSIS — D63.1 ANEMIA IN CHRONIC KIDNEY DISEASE (CODE): ICD-10-CM

## 2022-11-04 DIAGNOSIS — D63.1 ANEMIA IN STAGE 4 CHRONIC KIDNEY DISEASE (CMD): Primary | ICD-10-CM

## 2022-11-04 DIAGNOSIS — N18.4 CHRONIC KIDNEY DISEASE, STAGE 4 (SEVERE) (CMD): ICD-10-CM

## 2022-11-04 DIAGNOSIS — N18.4 ANEMIA IN STAGE 4 CHRONIC KIDNEY DISEASE (CMD): Primary | ICD-10-CM

## 2022-11-04 LAB
HCT VFR BLD CALC: 31.9 % (ref 39–51)
HGB BLD-MCNC: 10 G/DL (ref 13–17)

## 2022-11-04 PROCEDURE — 96372 THER/PROPH/DIAG INJ SC/IM: CPT

## 2022-11-04 PROCEDURE — 10002800 HB RX 250 W HCPCS: Performed by: INTERNAL MEDICINE

## 2022-11-04 PROCEDURE — 85018 HEMOGLOBIN: CPT | Performed by: CLINICAL MEDICAL LABORATORY

## 2022-11-04 PROCEDURE — 85014 HEMATOCRIT: CPT | Performed by: CLINICAL MEDICAL LABORATORY

## 2022-11-04 RX ADMIN — EPOETIN ALFA-EPBX 10000 UNITS: 10000 INJECTION, SOLUTION INTRAVENOUS; SUBCUTANEOUS at 12:38

## 2022-11-04 ASSESSMENT — PAIN SCALES - GENERAL: PAINLEVEL_OUTOF10: 0

## 2022-11-18 ENCOUNTER — LAB REQUISITION (OUTPATIENT)
Dept: LAB | Age: 87
End: 2022-11-18

## 2022-11-18 ENCOUNTER — HOSPITAL ENCOUNTER (OUTPATIENT)
Dept: INFUSION THERAPY | Age: 87
Discharge: STILL ADMITTED OR ADMINISTRATIVE | End: 2022-11-18
Attending: INTERNAL MEDICINE

## 2022-11-18 VITALS
TEMPERATURE: 98.2 F | OXYGEN SATURATION: 96 % | SYSTOLIC BLOOD PRESSURE: 133 MMHG | BODY MASS INDEX: 23.95 KG/M2 | DIASTOLIC BLOOD PRESSURE: 68 MMHG | WEIGHT: 139.55 LBS | HEART RATE: 65 BPM

## 2022-11-18 DIAGNOSIS — D63.1 ANEMIA IN CHRONIC KIDNEY DISEASE (CODE): ICD-10-CM

## 2022-11-18 DIAGNOSIS — D63.1 ANEMIA IN STAGE 4 CHRONIC KIDNEY DISEASE (CMD): Primary | ICD-10-CM

## 2022-11-18 DIAGNOSIS — N18.4 CHRONIC KIDNEY DISEASE, STAGE 4 (SEVERE) (CMD): ICD-10-CM

## 2022-11-18 DIAGNOSIS — N18.4 ANEMIA IN STAGE 4 CHRONIC KIDNEY DISEASE (CMD): Primary | ICD-10-CM

## 2022-11-18 LAB
HCT VFR BLD CALC: 31.2 % (ref 39–51)
HGB BLD-MCNC: 9.5 G/DL (ref 13–17)

## 2022-11-18 PROCEDURE — 85014 HEMATOCRIT: CPT | Performed by: CLINICAL MEDICAL LABORATORY

## 2022-11-18 PROCEDURE — 10002800 HB RX 250 W HCPCS: Performed by: INTERNAL MEDICINE

## 2022-11-18 PROCEDURE — 96372 THER/PROPH/DIAG INJ SC/IM: CPT

## 2022-11-18 PROCEDURE — 85018 HEMOGLOBIN: CPT | Performed by: CLINICAL MEDICAL LABORATORY

## 2022-11-18 RX ADMIN — EPOETIN ALFA-EPBX 10000 UNITS: 10000 INJECTION, SOLUTION INTRAVENOUS; SUBCUTANEOUS at 11:43

## 2022-12-02 ENCOUNTER — HOSPITAL ENCOUNTER (OUTPATIENT)
Dept: INFUSION THERAPY | Age: 87
Discharge: STILL ADMITTED OR ADMINISTRATIVE | End: 2022-12-02
Attending: INTERNAL MEDICINE

## 2022-12-02 ENCOUNTER — LAB REQUISITION (OUTPATIENT)
Dept: LAB | Age: 87
End: 2022-12-02

## 2022-12-02 VITALS
HEART RATE: 60 BPM | WEIGHT: 138.89 LBS | BODY MASS INDEX: 23.84 KG/M2 | TEMPERATURE: 98.2 F | SYSTOLIC BLOOD PRESSURE: 149 MMHG | OXYGEN SATURATION: 97 % | DIASTOLIC BLOOD PRESSURE: 71 MMHG

## 2022-12-02 DIAGNOSIS — D63.1 ANEMIA IN STAGE 4 CHRONIC KIDNEY DISEASE (CMD): Primary | ICD-10-CM

## 2022-12-02 DIAGNOSIS — N18.4 CHRONIC KIDNEY DISEASE, STAGE 4 (SEVERE) (CMD): ICD-10-CM

## 2022-12-02 DIAGNOSIS — D63.1 ANEMIA IN CHRONIC KIDNEY DISEASE (CODE): ICD-10-CM

## 2022-12-02 DIAGNOSIS — N18.4 ANEMIA IN STAGE 4 CHRONIC KIDNEY DISEASE (CMD): Primary | ICD-10-CM

## 2022-12-02 LAB
HCT VFR BLD CALC: 32.3 % (ref 39–51)
HGB BLD-MCNC: 9.7 G/DL (ref 13–17)

## 2022-12-02 PROCEDURE — 10002800 HB RX 250 W HCPCS: Performed by: INTERNAL MEDICINE

## 2022-12-02 PROCEDURE — 96372 THER/PROPH/DIAG INJ SC/IM: CPT

## 2022-12-02 PROCEDURE — 85018 HEMOGLOBIN: CPT | Performed by: CLINICAL MEDICAL LABORATORY

## 2022-12-02 PROCEDURE — 85014 HEMATOCRIT: CPT | Performed by: CLINICAL MEDICAL LABORATORY

## 2022-12-02 RX ADMIN — EPOETIN ALFA-EPBX 10000 UNITS: 10000 INJECTION, SOLUTION INTRAVENOUS; SUBCUTANEOUS at 11:52

## 2022-12-02 ASSESSMENT — PATIENT HEALTH QUESTIONNAIRE - PHQ9
IS PATIENT ABLE TO COMPLETE PHQ2 OR PHQ9: YES
SUM OF ALL RESPONSES TO PHQ9 QUESTIONS 1 AND 2: 0
SUM OF ALL RESPONSES TO PHQ9 QUESTIONS 1 AND 2: 0
CLINICAL INTERPRETATION OF PHQ2 SCORE: NO FURTHER SCREENING NEEDED
2. FEELING DOWN, DEPRESSED OR HOPELESS: NOT AT ALL
1. LITTLE INTEREST OR PLEASURE IN DOING THINGS: NOT AT ALL

## 2022-12-02 ASSESSMENT — PAIN SCALES - GENERAL: PAINLEVEL_OUTOF10: 0

## 2022-12-07 ENCOUNTER — EXTERNAL LAB (OUTPATIENT)
Dept: OTHER | Age: 87
End: 2022-12-07

## 2022-12-07 LAB
25(OH)D3+25(OH)D2 SERPL-MCNC: 48.7 NG/ML (ref 30–100)
ALBUMIN SERPL-MCNC: 4.2 G/DL (ref 3.3–5.2)
ALP SERPL-CCNC: 36 U/L (ref 35–115)
ALT SERPL-CCNC: 9 U/L (ref 7–48)
AST SERPL-CCNC: 11 U/L (ref 7–48)
BASOPHILS NFR BLD: 0.6 % (ref 0–5)
BILIRUB SERPL-MCNC: 0.61 MG/DL (ref 0.2–1.2)
BUN SERPL-MCNC: 59 MG/DL (ref 6–24)
BUN/CREAT SERPL: 17.5 MG/DL (ref 5–26)
CALCIUM SERPL-MCNC: 9.2 MG/DL (ref 8.3–11)
CHLORIDE SERPL-SCNC: 111 MEQ/L (ref 98–108)
CHOLEST SERPL-MCNC: 95 MG/DL (ref 134–200)
CHOLEST/HDLC SERPL: 2.3 {RATIO} (ref 0–5)
CO2 SERPL-SCNC: 23 MEQ/L (ref 21–31)
CREAT SERPL-MCNC: 3.37 MG/DL (ref 0.6–1.4)
EOSINOPHIL NFR BLD: 0.8 % (ref 0–6)
ERYTHROCYTE [DISTWIDTH] IN BLOOD: 18.3 % (ref 11.5–14.5)
FERRITIN SERPL-MCNC: 142.9 NG/ML (ref 24–336)
FOLATE SERPL-MCNC: 10.54 NG/ML (ref 5.21–40)
GLUCOSE SERPL-MCNC: 107 MG/DL (ref 65–110)
HCT VFR BLD CALC: 34.2 % (ref 42–52)
HDLC SERPL-MCNC: 42 MG/DL
HGB BLD-MCNC: 10 GM/DL (ref 13.8–17.2)
IRON SATN MFR SERPL: 6.3 %
IRON SERPL-MCNC: 16 UG/DL (ref 55–165)
LDLC SERPL CALC-MCNC: 31.8 MG/DL (ref 30–100)
LENGTH OF FAST TIME PATIENT: ABNORMAL H
LENGTH OF FAST TIME PATIENT: ABNORMAL H
LYMPHOCYTES NFR BLD: 11.2 % (ref 18–47)
MCH RBC QN AUTO: 20.8 PG (ref 27–33)
MCHC RBC AUTO-ENTMCNC: 29.4 G/DL (ref 32–36)
MCV RBC AUTO: 70.7 FL (ref 81–95)
MONOCYTES NFR BLD: 8.6 % (ref 0–12)
MPV (OFFPRE2): 10 FL (ref 7.4–10.4)
NEUTROPHILS # BLD: 8.4 10**3/UL (ref 1.8–7.7)
NEUTROPHILS NFR BLD: 78.8 % (ref 40–75)
PLATELET # BLD: 231 10**3/UL (ref 130–400)
POTASSIUM SERPL-SCNC: 5 MEQ/L (ref 3.5–5.3)
PROT SERPL-MCNC: 6.7 GM/DL (ref 6–8.5)
RBC # BLD: 4.84 10**6/UL (ref 4.4–6.1)
SODIUM SERPL-SCNC: 145 MEQ/L (ref 135–148)
T3FREE SERPL-MCNC: 4.63 NG/L (ref 2.39–6.79)
T4 FREE SERPL-MCNC: 0.73 NG/DL (ref 0.6–2.4)
TIBC SERPL-MCNC: 238 UG/DL (ref 257–369)
TRIGL SERPL-MCNC: 106 MG/DL (ref 30–150)
TSH SERPL-ACNC: 1.7 UU/ML (ref 0.35–6)
VIT B12 SERPL-MCNC: 672 PG/ML (ref 180–914)
VLDLC SERPL CALC-MCNC: 21.2 MG/DL (ref 0–120)
WBC # BLD: 10.7 10**3/UL (ref 4–10)

## 2022-12-15 DIAGNOSIS — N18.4 ANEMIA IN STAGE 4 CHRONIC KIDNEY DISEASE (CMD): Primary | ICD-10-CM

## 2022-12-15 DIAGNOSIS — D63.1 ANEMIA IN STAGE 4 CHRONIC KIDNEY DISEASE (CMD): Primary | ICD-10-CM

## 2022-12-16 ENCOUNTER — HOSPITAL ENCOUNTER (OUTPATIENT)
Dept: INFUSION THERAPY | Age: 87
Discharge: STILL ADMITTED OR ADMINISTRATIVE | End: 2022-12-16
Attending: INTERNAL MEDICINE

## 2022-12-16 ENCOUNTER — LAB REQUISITION (OUTPATIENT)
Dept: LAB | Age: 87
End: 2022-12-16

## 2022-12-16 VITALS
DIASTOLIC BLOOD PRESSURE: 79 MMHG | BODY MASS INDEX: 23.65 KG/M2 | TEMPERATURE: 98.1 F | SYSTOLIC BLOOD PRESSURE: 149 MMHG | HEART RATE: 74 BPM | RESPIRATION RATE: 18 BRPM | WEIGHT: 137.79 LBS | OXYGEN SATURATION: 98 %

## 2022-12-16 DIAGNOSIS — D50.8 OTHER IRON DEFICIENCY ANEMIAS: ICD-10-CM

## 2022-12-16 DIAGNOSIS — D63.1 ANEMIA IN STAGE 4 CHRONIC KIDNEY DISEASE (CMD): Primary | ICD-10-CM

## 2022-12-16 DIAGNOSIS — N18.4 CHRONIC KIDNEY DISEASE, STAGE 4 (SEVERE) (CMD): ICD-10-CM

## 2022-12-16 DIAGNOSIS — N18.4 ANEMIA IN STAGE 4 CHRONIC KIDNEY DISEASE (CMD): Primary | ICD-10-CM

## 2022-12-16 LAB
BASOPHILS # BLD: 0 K/MCL (ref 0–0.3)
BASOPHILS NFR BLD: 0 %
DEPRECATED RDW RBC: 39.5 FL (ref 39–50)
EOSINOPHIL # BLD: 0.1 K/MCL (ref 0–0.5)
EOSINOPHIL NFR BLD: 1 %
ERYTHROCYTE [DISTWIDTH] IN BLOOD: 16.9 % (ref 11–15)
HCT VFR BLD CALC: 32.6 % (ref 39–51)
HGB BLD-MCNC: 9.8 G/DL (ref 13–17)
IMM GRANULOCYTES # BLD AUTO: 0 K/MCL (ref 0–0.2)
IMM GRANULOCYTES # BLD: 0 %
LYMPHOCYTES # BLD: 1.4 K/MCL (ref 1–4)
LYMPHOCYTES NFR BLD: 19 %
MCH RBC QN AUTO: 20.2 PG (ref 26–34)
MCHC RBC AUTO-ENTMCNC: 30.1 G/DL (ref 32–36.5)
MCV RBC AUTO: 67.1 FL (ref 78–100)
MONOCYTES # BLD: 1 K/MCL (ref 0.3–0.9)
MONOCYTES NFR BLD: 13 %
NEUTROPHILS # BLD: 5.1 K/MCL (ref 1.8–7.7)
NEUTROPHILS NFR BLD: 67 %
NRBC BLD MANUAL-RTO: 0 /100 WBC
PLATELET # BLD AUTO: 252 K/MCL (ref 140–450)
RBC # BLD: 4.86 MIL/MCL (ref 4.5–5.9)
WBC # BLD: 7.6 K/MCL (ref 4.2–11)

## 2022-12-16 PROCEDURE — 85025 COMPLETE CBC W/AUTO DIFF WBC: CPT | Performed by: CLINICAL MEDICAL LABORATORY

## 2022-12-16 PROCEDURE — 10002800 HB RX 250 W HCPCS: Performed by: INTERNAL MEDICINE

## 2022-12-16 PROCEDURE — 96372 THER/PROPH/DIAG INJ SC/IM: CPT

## 2022-12-16 RX ADMIN — EPOETIN ALFA-EPBX 10000 UNITS: 10000 INJECTION, SOLUTION INTRAVENOUS; SUBCUTANEOUS at 11:43

## 2022-12-16 ASSESSMENT — PATIENT HEALTH QUESTIONNAIRE - PHQ9
IS PATIENT ABLE TO COMPLETE PHQ2 OR PHQ9: YES
SUM OF ALL RESPONSES TO PHQ9 QUESTIONS 1 AND 2: 0
CLINICAL INTERPRETATION OF PHQ2 SCORE: NO FURTHER SCREENING NEEDED
SUM OF ALL RESPONSES TO PHQ9 QUESTIONS 1 AND 2: 0
2. FEELING DOWN, DEPRESSED OR HOPELESS: NOT AT ALL
1. LITTLE INTEREST OR PLEASURE IN DOING THINGS: NOT AT ALL

## 2022-12-16 ASSESSMENT — PAIN SCALES - GENERAL: PAINLEVEL_OUTOF10: 0

## 2022-12-30 ENCOUNTER — HOSPITAL ENCOUNTER (OUTPATIENT)
Dept: INFUSION THERAPY | Age: 87
Discharge: STILL ADMITTED OR ADMINISTRATIVE | End: 2022-12-30
Attending: INTERNAL MEDICINE

## 2022-12-30 ENCOUNTER — LAB REQUISITION (OUTPATIENT)
Dept: LAB | Age: 87
End: 2022-12-30

## 2022-12-30 VITALS
TEMPERATURE: 97.5 F | HEART RATE: 78 BPM | SYSTOLIC BLOOD PRESSURE: 140 MMHG | DIASTOLIC BLOOD PRESSURE: 53 MMHG | OXYGEN SATURATION: 98 % | BODY MASS INDEX: 23.84 KG/M2 | WEIGHT: 138.89 LBS

## 2022-12-30 DIAGNOSIS — I12.9 HYPERTENSIVE CHRONIC KIDNEY DISEASE WITH STAGE 1 THROUGH STAGE 4 CHRONIC KIDNEY DISEASE, OR UNSPECIFIED CHRONIC KIDNEY DISEASE: ICD-10-CM

## 2022-12-30 DIAGNOSIS — I25.10 ATHEROSCLEROTIC HEART DISEASE OF NATIVE CORONARY ARTERY WITHOUT ANGINA PECTORIS: ICD-10-CM

## 2022-12-30 DIAGNOSIS — R80.9 PROTEINURIA, UNSPECIFIED: ICD-10-CM

## 2022-12-30 DIAGNOSIS — E78.5 HYPERLIPIDEMIA, UNSPECIFIED: ICD-10-CM

## 2022-12-30 DIAGNOSIS — N18.4 CHRONIC KIDNEY DISEASE, STAGE 4 (SEVERE) (CMD): ICD-10-CM

## 2022-12-30 DIAGNOSIS — D50.8 OTHER IRON DEFICIENCY ANEMIAS: ICD-10-CM

## 2022-12-30 DIAGNOSIS — D63.1 ANEMIA IN STAGE 4 CHRONIC KIDNEY DISEASE (CMD): Primary | ICD-10-CM

## 2022-12-30 DIAGNOSIS — N18.4 ANEMIA IN STAGE 4 CHRONIC KIDNEY DISEASE (CMD): Primary | ICD-10-CM

## 2022-12-30 DIAGNOSIS — D63.1 ANEMIA IN CHRONIC KIDNEY DISEASE (CODE): ICD-10-CM

## 2022-12-30 LAB
25(OH)D3+25(OH)D2 SERPL-MCNC: 46.7 NG/ML (ref 30–100)
ALBUMIN SERPL-MCNC: 3.9 G/DL (ref 3.6–5.1)
ALBUMIN/GLOB SERPL: 1.3 {RATIO} (ref 1–2.4)
ALP SERPL-CCNC: 37 UNITS/L (ref 45–117)
ALT SERPL-CCNC: 17 UNITS/L
ANION GAP SERPL CALC-SCNC: 11 MMOL/L (ref 7–19)
APPEARANCE UR: CLEAR
AST SERPL-CCNC: 12 UNITS/L
BACTERIA #/AREA URNS HPF: ABNORMAL /HPF
BASOPHILS # BLD: 0 K/MCL (ref 0–0.3)
BASOPHILS NFR BLD: 0 %
BILIRUB SERPL-MCNC: 0.5 MG/DL (ref 0.2–1)
BILIRUB UR QL STRIP: NEGATIVE
BUN SERPL-MCNC: 74 MG/DL (ref 6–20)
BUN/CREAT SERPL: 25 (ref 7–25)
CALCIUM SERPL-MCNC: 9.1 MG/DL (ref 8.4–10.2)
CHLORIDE SERPL-SCNC: 114 MMOL/L (ref 97–110)
CO2 SERPL-SCNC: 22 MMOL/L (ref 21–32)
COLOR UR: COLORLESS
CREAT SERPL-MCNC: 2.95 MG/DL (ref 0.67–1.17)
CREAT UR-MCNC: 45.6 MG/DL
DEPRECATED RDW RBC: 41 FL (ref 39–50)
EOSINOPHIL # BLD: 0 K/MCL (ref 0–0.5)
EOSINOPHIL NFR BLD: 1 %
ERYTHROCYTE [DISTWIDTH] IN BLOOD: 17.4 % (ref 11–15)
FASTING DURATION TIME PATIENT: ABNORMAL H
FERRITIN SERPL-MCNC: 242 NG/ML (ref 26–388)
GFR SERPLBLD BASED ON 1.73 SQ M-ARVRAT: 20 ML/MIN
GLOBULIN SER-MCNC: 2.9 G/DL (ref 2–4)
GLUCOSE SERPL-MCNC: 91 MG/DL (ref 70–99)
GLUCOSE UR STRIP-MCNC: NEGATIVE MG/DL
HCT VFR BLD CALC: 31 % (ref 39–51)
HGB BLD-MCNC: 9.4 G/DL (ref 13–17)
HGB UR QL STRIP: NEGATIVE
HYALINE CASTS #/AREA URNS LPF: ABNORMAL /LPF
IMM GRANULOCYTES # BLD AUTO: 0 K/MCL (ref 0–0.2)
IMM GRANULOCYTES # BLD: 0 %
IRON SATN MFR SERPL: 31 % (ref 15–45)
IRON SERPL-MCNC: 80 MCG/DL (ref 65–175)
KETONES UR STRIP-MCNC: NEGATIVE MG/DL
LEUKOCYTE ESTERASE UR QL STRIP: NEGATIVE
LYMPHOCYTES # BLD: 1 K/MCL (ref 1–4)
LYMPHOCYTES NFR BLD: 15 %
MCH RBC QN AUTO: 20.1 PG (ref 26–34)
MCHC RBC AUTO-ENTMCNC: 30.3 G/DL (ref 32–36.5)
MCV RBC AUTO: 66.4 FL (ref 78–100)
MICROALBUMIN UR-MCNC: 78 MG/DL
MICROALBUMIN/CREAT UR: 1710.5 MG/G
MONOCYTES # BLD: 0.7 K/MCL (ref 0.3–0.9)
MONOCYTES NFR BLD: 11 %
NEUTROPHILS # BLD: 4.6 K/MCL (ref 1.8–7.7)
NEUTROPHILS NFR BLD: 73 %
NITRITE UR QL STRIP: NEGATIVE
NRBC BLD MANUAL-RTO: 0 /100 WBC
PH UR STRIP: 5.5 [PH] (ref 5–7)
PHOSPHATE SERPL-MCNC: 4.3 MG/DL (ref 2.4–4.7)
PLATELET # BLD AUTO: 212 K/MCL (ref 140–450)
POTASSIUM SERPL-SCNC: 4.5 MMOL/L (ref 3.4–5.1)
PROT SERPL-MCNC: 6.8 G/DL (ref 6.4–8.2)
PROT UR STRIP-MCNC: 100 MG/DL
RBC # BLD: 4.67 MIL/MCL (ref 4.5–5.9)
RBC #/AREA URNS HPF: ABNORMAL /HPF
SODIUM SERPL-SCNC: 142 MMOL/L (ref 135–145)
SP GR UR STRIP: 1.01 (ref 1–1.03)
SQUAMOUS #/AREA URNS HPF: ABNORMAL /HPF
TIBC SERPL-MCNC: 256 MCG/DL (ref 250–450)
UROBILINOGEN UR STRIP-MCNC: 0.2 MG/DL
WBC # BLD: 6.3 K/MCL (ref 4.2–11)
WBC #/AREA URNS HPF: ABNORMAL /HPF

## 2022-12-30 PROCEDURE — 96372 THER/PROPH/DIAG INJ SC/IM: CPT

## 2022-12-30 PROCEDURE — 83540 ASSAY OF IRON: CPT | Performed by: CLINICAL MEDICAL LABORATORY

## 2022-12-30 PROCEDURE — 82043 UR ALBUMIN QUANTITATIVE: CPT | Performed by: CLINICAL MEDICAL LABORATORY

## 2022-12-30 PROCEDURE — 84100 ASSAY OF PHOSPHORUS: CPT | Performed by: CLINICAL MEDICAL LABORATORY

## 2022-12-30 PROCEDURE — 82306 VITAMIN D 25 HYDROXY: CPT | Performed by: CLINICAL MEDICAL LABORATORY

## 2022-12-30 PROCEDURE — 10002800 HB RX 250 W HCPCS: Performed by: INTERNAL MEDICINE

## 2022-12-30 PROCEDURE — 82570 ASSAY OF URINE CREATININE: CPT | Performed by: CLINICAL MEDICAL LABORATORY

## 2022-12-30 PROCEDURE — 83550 IRON BINDING TEST: CPT | Performed by: CLINICAL MEDICAL LABORATORY

## 2022-12-30 PROCEDURE — 80053 COMPREHEN METABOLIC PANEL: CPT | Performed by: CLINICAL MEDICAL LABORATORY

## 2022-12-30 PROCEDURE — 81001 URINALYSIS AUTO W/SCOPE: CPT | Performed by: CLINICAL MEDICAL LABORATORY

## 2022-12-30 PROCEDURE — 82728 ASSAY OF FERRITIN: CPT | Performed by: CLINICAL MEDICAL LABORATORY

## 2022-12-30 PROCEDURE — 85025 COMPLETE CBC W/AUTO DIFF WBC: CPT | Performed by: CLINICAL MEDICAL LABORATORY

## 2022-12-30 RX ADMIN — EPOETIN ALFA-EPBX 10000 UNITS: 10000 INJECTION, SOLUTION INTRAVENOUS; SUBCUTANEOUS at 11:29

## 2022-12-30 ASSESSMENT — PATIENT HEALTH QUESTIONNAIRE - PHQ9
IS PATIENT ABLE TO COMPLETE PHQ2 OR PHQ9: YES
CLINICAL INTERPRETATION OF PHQ2 SCORE: NO FURTHER SCREENING NEEDED
SUM OF ALL RESPONSES TO PHQ9 QUESTIONS 1 AND 2: 0

## 2023-01-13 ENCOUNTER — HOSPITAL ENCOUNTER (OUTPATIENT)
Dept: INFUSION THERAPY | Age: 88
Discharge: STILL ADMITTED OR ADMINISTRATIVE | End: 2023-01-13
Attending: INTERNAL MEDICINE

## 2023-01-13 ENCOUNTER — LAB REQUISITION (OUTPATIENT)
Dept: LAB | Age: 88
End: 2023-01-13

## 2023-01-13 VITALS
SYSTOLIC BLOOD PRESSURE: 127 MMHG | BODY MASS INDEX: 24.07 KG/M2 | OXYGEN SATURATION: 97 % | DIASTOLIC BLOOD PRESSURE: 81 MMHG | TEMPERATURE: 98.1 F | HEART RATE: 56 BPM | WEIGHT: 140.21 LBS

## 2023-01-13 DIAGNOSIS — N18.4 ANEMIA IN STAGE 4 CHRONIC KIDNEY DISEASE (CMD): Primary | ICD-10-CM

## 2023-01-13 DIAGNOSIS — D50.8 OTHER IRON DEFICIENCY ANEMIAS: ICD-10-CM

## 2023-01-13 DIAGNOSIS — N18.4 CHRONIC KIDNEY DISEASE, STAGE 4 (SEVERE) (CMD): ICD-10-CM

## 2023-01-13 DIAGNOSIS — D63.1 ANEMIA IN STAGE 4 CHRONIC KIDNEY DISEASE (CMD): Primary | ICD-10-CM

## 2023-01-13 DIAGNOSIS — D63.1 ANEMIA IN CHRONIC KIDNEY DISEASE (CODE): ICD-10-CM

## 2023-01-13 LAB
BASOPHILS # BLD: 0 K/MCL (ref 0–0.3)
BASOPHILS NFR BLD: 0 %
DEPRECATED RDW RBC: 40.6 FL (ref 39–50)
EOSINOPHIL # BLD: 0.1 K/MCL (ref 0–0.5)
EOSINOPHIL NFR BLD: 1 %
ERYTHROCYTE [DISTWIDTH] IN BLOOD: 17.2 % (ref 11–15)
HCT VFR BLD CALC: 31.7 % (ref 39–51)
HGB BLD-MCNC: 9.7 G/DL (ref 13–17)
IMM GRANULOCYTES # BLD AUTO: 0 K/MCL (ref 0–0.2)
IMM GRANULOCYTES # BLD: 0 %
LYMPHOCYTES # BLD: 1.2 K/MCL (ref 1–4)
LYMPHOCYTES NFR BLD: 17 %
MCH RBC QN AUTO: 20.6 PG (ref 26–34)
MCHC RBC AUTO-ENTMCNC: 30.6 G/DL (ref 32–36.5)
MCV RBC AUTO: 67.3 FL (ref 78–100)
MONOCYTES # BLD: 1 K/MCL (ref 0.3–0.9)
MONOCYTES NFR BLD: 13 %
NEUTROPHILS # BLD: 5.1 K/MCL (ref 1.8–7.7)
NEUTROPHILS NFR BLD: 69 %
NRBC BLD MANUAL-RTO: 0 /100 WBC
PLATELET # BLD AUTO: 226 K/MCL (ref 140–450)
RBC # BLD: 4.71 MIL/MCL (ref 4.5–5.9)
WBC # BLD: 7.5 K/MCL (ref 4.2–11)

## 2023-01-13 PROCEDURE — 96372 THER/PROPH/DIAG INJ SC/IM: CPT

## 2023-01-13 PROCEDURE — 85025 COMPLETE CBC W/AUTO DIFF WBC: CPT | Performed by: CLINICAL MEDICAL LABORATORY

## 2023-01-13 PROCEDURE — 36415 COLL VENOUS BLD VENIPUNCTURE: CPT | Performed by: CLINICAL MEDICAL LABORATORY

## 2023-01-13 PROCEDURE — 10002800 HB RX 250 W HCPCS: Performed by: INTERNAL MEDICINE

## 2023-01-13 RX ADMIN — EPOETIN ALFA-EPBX 10000 UNITS: 10000 INJECTION, SOLUTION INTRAVENOUS; SUBCUTANEOUS at 11:09

## 2023-01-13 ASSESSMENT — PAIN SCALES - GENERAL: PAINLEVEL_OUTOF10: 0

## 2023-01-19 ENCOUNTER — APPOINTMENT (OUTPATIENT)
Dept: CARDIOLOGY | Age: 88
End: 2023-01-19

## 2023-01-20 ENCOUNTER — APPOINTMENT (OUTPATIENT)
Dept: CARDIOLOGY | Age: 88
End: 2023-01-20

## 2023-01-24 ENCOUNTER — APPOINTMENT (OUTPATIENT)
Dept: SURGERY | Age: 88
End: 2023-01-24

## 2023-01-27 ENCOUNTER — LAB REQUISITION (OUTPATIENT)
Dept: LAB | Age: 88
End: 2023-01-27

## 2023-01-27 ENCOUNTER — OFFICE VISIT (OUTPATIENT)
Dept: SURGERY | Age: 88
End: 2023-01-27

## 2023-01-27 ENCOUNTER — HOSPITAL ENCOUNTER (OUTPATIENT)
Dept: INFUSION THERAPY | Age: 88
Discharge: STILL ADMITTED OR ADMINISTRATIVE | End: 2023-01-27
Attending: INTERNAL MEDICINE

## 2023-01-27 VITALS
BODY MASS INDEX: 24.92 KG/M2 | RESPIRATION RATE: 18 BRPM | WEIGHT: 146 LBS | SYSTOLIC BLOOD PRESSURE: 128 MMHG | HEART RATE: 70 BPM | DIASTOLIC BLOOD PRESSURE: 80 MMHG | OXYGEN SATURATION: 98 % | TEMPERATURE: 98 F | HEIGHT: 64 IN

## 2023-01-27 VITALS
OXYGEN SATURATION: 95 % | TEMPERATURE: 98.2 F | DIASTOLIC BLOOD PRESSURE: 69 MMHG | WEIGHT: 135.58 LBS | BODY MASS INDEX: 23.27 KG/M2 | HEART RATE: 57 BPM | SYSTOLIC BLOOD PRESSURE: 122 MMHG

## 2023-01-27 DIAGNOSIS — D63.1 ANEMIA IN STAGE 4 CHRONIC KIDNEY DISEASE (CMD): Primary | ICD-10-CM

## 2023-01-27 DIAGNOSIS — D17.0 LIPOMA OF NECK: ICD-10-CM

## 2023-01-27 DIAGNOSIS — R13.12 OROPHARYNGEAL DYSPHAGIA: ICD-10-CM

## 2023-01-27 DIAGNOSIS — D63.1 ANEMIA IN CHRONIC KIDNEY DISEASE (CODE): ICD-10-CM

## 2023-01-27 DIAGNOSIS — N18.4 CHRONIC KIDNEY DISEASE, STAGE 4 (SEVERE) (CMD): ICD-10-CM

## 2023-01-27 DIAGNOSIS — D50.8 OTHER IRON DEFICIENCY ANEMIAS: ICD-10-CM

## 2023-01-27 DIAGNOSIS — E04.1 LEFT THYROID NODULE: Primary | ICD-10-CM

## 2023-01-27 DIAGNOSIS — N18.4 ANEMIA IN STAGE 4 CHRONIC KIDNEY DISEASE (CMD): Primary | ICD-10-CM

## 2023-01-27 LAB
BASOPHILS # BLD: 0 K/MCL (ref 0–0.3)
BASOPHILS NFR BLD: 0 %
DEPRECATED RDW RBC: 38.9 FL (ref 39–50)
EOSINOPHIL # BLD: 0.1 K/MCL (ref 0–0.5)
EOSINOPHIL NFR BLD: 1 %
ERYTHROCYTE [DISTWIDTH] IN BLOOD: 16.9 % (ref 11–15)
HCT VFR BLD CALC: 31.2 % (ref 39–51)
HGB BLD-MCNC: 9.8 G/DL (ref 13–17)
IMM GRANULOCYTES # BLD AUTO: 0 K/MCL (ref 0–0.2)
IMM GRANULOCYTES # BLD: 0 %
LYMPHOCYTES # BLD: 1.1 K/MCL (ref 1–4)
LYMPHOCYTES NFR BLD: 12 %
MCH RBC QN AUTO: 20.6 PG (ref 26–34)
MCHC RBC AUTO-ENTMCNC: 31.4 G/DL (ref 32–36.5)
MCV RBC AUTO: 65.7 FL (ref 78–100)
MONOCYTES # BLD: 0.9 K/MCL (ref 0.3–0.9)
MONOCYTES NFR BLD: 9 %
NEUTROPHILS # BLD: 7.1 K/MCL (ref 1.8–7.7)
NEUTROPHILS NFR BLD: 78 %
NRBC BLD MANUAL-RTO: 0 /100 WBC
PLATELET # BLD AUTO: 208 K/MCL (ref 140–450)
RBC # BLD: 4.75 MIL/MCL (ref 4.5–5.9)
WBC # BLD: 9.1 K/MCL (ref 4.2–11)

## 2023-01-27 PROCEDURE — 99204 OFFICE O/P NEW MOD 45 MIN: CPT | Performed by: SURGERY

## 2023-01-27 PROCEDURE — 96372 THER/PROPH/DIAG INJ SC/IM: CPT

## 2023-01-27 PROCEDURE — 3074F SYST BP LT 130 MM HG: CPT | Performed by: SURGERY

## 2023-01-27 PROCEDURE — 85025 COMPLETE CBC W/AUTO DIFF WBC: CPT | Performed by: CLINICAL MEDICAL LABORATORY

## 2023-01-27 PROCEDURE — 88173 CYTOPATH EVAL FNA REPORT: CPT | Performed by: INTERNAL MEDICINE

## 2023-01-27 PROCEDURE — 10002800 HB RX 250 W HCPCS: Performed by: INTERNAL MEDICINE

## 2023-01-27 PROCEDURE — 10005 FNA BX W/US GDN 1ST LES: CPT | Performed by: SURGERY

## 2023-01-27 PROCEDURE — 3079F DIAST BP 80-89 MM HG: CPT | Performed by: SURGERY

## 2023-01-27 RX ADMIN — EPOETIN ALFA-EPBX 10000 UNITS: 10000 INJECTION, SOLUTION INTRAVENOUS; SUBCUTANEOUS at 09:47

## 2023-01-27 ASSESSMENT — PATIENT HEALTH QUESTIONNAIRE - PHQ9
SUM OF ALL RESPONSES TO PHQ9 QUESTIONS 1 AND 2: 0
1. LITTLE INTEREST OR PLEASURE IN DOING THINGS: NOT AT ALL
SUM OF ALL RESPONSES TO PHQ9 QUESTIONS 1 AND 2: 0
IS PATIENT ABLE TO COMPLETE PHQ2 OR PHQ9: YES
CLINICAL INTERPRETATION OF PHQ2 SCORE: NO FURTHER SCREENING NEEDED
2. FEELING DOWN, DEPRESSED OR HOPELESS: NOT AT ALL

## 2023-01-27 ASSESSMENT — PAIN SCALES - GENERAL: PAINLEVEL_OUTOF10: 0

## 2023-01-27 ASSESSMENT — ENCOUNTER SYMPTOMS: ROS GI COMMENTS: DYSPHAGIA

## 2023-01-30 LAB
ASR DISCLAIMER: NORMAL
CASE RPRT: NORMAL
CLINICAL INFO: NORMAL
PATH REPORT.FINAL DX SPEC: NORMAL
PATH REPORT.GROSS SPEC: NORMAL
PATH REPORT.MICROSCOPIC SPEC OTHER STN: NORMAL

## 2023-02-09 ENCOUNTER — HOSPITAL ENCOUNTER (OUTPATIENT)
Dept: INFUSION THERAPY | Age: 88
Discharge: STILL ADMITTED OR ADMINISTRATIVE | End: 2023-02-09
Attending: INTERNAL MEDICINE

## 2023-02-09 ENCOUNTER — HOSPITAL ENCOUNTER (OUTPATIENT)
Dept: GENERAL RADIOLOGY | Age: 88
Discharge: HOME | End: 2023-02-09
Attending: SURGERY

## 2023-02-09 ENCOUNTER — APPOINTMENT (OUTPATIENT)
Dept: LAB | Age: 88
End: 2023-02-09
Attending: SURGERY

## 2023-02-09 ENCOUNTER — LAB REQUISITION (OUTPATIENT)
Dept: LAB | Age: 88
End: 2023-02-09

## 2023-02-09 VITALS
TEMPERATURE: 98.1 F | HEART RATE: 69 BPM | OXYGEN SATURATION: 96 % | DIASTOLIC BLOOD PRESSURE: 79 MMHG | BODY MASS INDEX: 24.29 KG/M2 | SYSTOLIC BLOOD PRESSURE: 127 MMHG | WEIGHT: 141.54 LBS

## 2023-02-09 DIAGNOSIS — E04.1 LEFT THYROID NODULE: ICD-10-CM

## 2023-02-09 DIAGNOSIS — D50.8 OTHER IRON DEFICIENCY ANEMIAS: ICD-10-CM

## 2023-02-09 DIAGNOSIS — N18.4 CHRONIC KIDNEY DISEASE, STAGE 4 (SEVERE) (CMD): ICD-10-CM

## 2023-02-09 DIAGNOSIS — D63.1 ANEMIA IN CHRONIC KIDNEY DISEASE (CODE): ICD-10-CM

## 2023-02-09 DIAGNOSIS — R13.12 OROPHARYNGEAL DYSPHAGIA: ICD-10-CM

## 2023-02-09 DIAGNOSIS — D63.1 ANEMIA IN STAGE 4 CHRONIC KIDNEY DISEASE (CMD): Primary | ICD-10-CM

## 2023-02-09 DIAGNOSIS — N18.4 ANEMIA IN STAGE 4 CHRONIC KIDNEY DISEASE (CMD): Primary | ICD-10-CM

## 2023-02-09 LAB
BASOPHILS # BLD: 0 K/MCL (ref 0–0.3)
BASOPHILS NFR BLD: 0 %
DEPRECATED RDW RBC: 40.5 FL (ref 39–50)
EOSINOPHIL # BLD: 0.1 K/MCL (ref 0–0.5)
EOSINOPHIL NFR BLD: 1 %
ERYTHROCYTE [DISTWIDTH] IN BLOOD: 17.2 % (ref 11–15)
HCT VFR BLD CALC: 30.4 % (ref 39–51)
HGB BLD-MCNC: 9.2 G/DL (ref 13–17)
IMM GRANULOCYTES # BLD AUTO: 0 K/MCL (ref 0–0.2)
IMM GRANULOCYTES # BLD: 0 %
LYMPHOCYTES # BLD: 1 K/MCL (ref 1–4)
LYMPHOCYTES NFR BLD: 16 %
MCH RBC QN AUTO: 20.3 PG (ref 26–34)
MCHC RBC AUTO-ENTMCNC: 30.3 G/DL (ref 32–36.5)
MCV RBC AUTO: 67.1 FL (ref 78–100)
MONOCYTES # BLD: 0.7 K/MCL (ref 0.3–0.9)
MONOCYTES NFR BLD: 12 %
NEUTROPHILS # BLD: 4.3 K/MCL (ref 1.8–7.7)
NEUTROPHILS NFR BLD: 71 %
NRBC BLD MANUAL-RTO: 0 /100 WBC
PLATELET # BLD AUTO: 230 K/MCL (ref 140–450)
RBC # BLD: 4.53 MIL/MCL (ref 4.5–5.9)
WBC # BLD: 6.1 K/MCL (ref 4.2–11)

## 2023-02-09 PROCEDURE — 85025 COMPLETE CBC W/AUTO DIFF WBC: CPT | Performed by: CLINICAL MEDICAL LABORATORY

## 2023-02-09 PROCEDURE — 96372 THER/PROPH/DIAG INJ SC/IM: CPT

## 2023-02-09 PROCEDURE — 10002800 HB RX 250 W HCPCS: Performed by: INTERNAL MEDICINE

## 2023-02-09 PROCEDURE — 74230 X-RAY XM SWLNG FUNCJ C+: CPT

## 2023-02-09 PROCEDURE — 92611 MOTION FLUOROSCOPY/SWALLOW: CPT | Performed by: SPEECH-LANGUAGE PATHOLOGIST

## 2023-02-09 RX ADMIN — EPOETIN ALFA-EPBX 10000 UNITS: 10000 INJECTION, SOLUTION INTRAVENOUS; SUBCUTANEOUS at 11:53

## 2023-02-09 ASSESSMENT — PATIENT HEALTH QUESTIONNAIRE - PHQ9
IS PATIENT ABLE TO COMPLETE PHQ2 OR PHQ9: YES
CLINICAL INTERPRETATION OF PHQ2 SCORE: NO FURTHER SCREENING NEEDED
1. LITTLE INTEREST OR PLEASURE IN DOING THINGS: NOT AT ALL
SUM OF ALL RESPONSES TO PHQ9 QUESTIONS 1 AND 2: 0
SUM OF ALL RESPONSES TO PHQ9 QUESTIONS 1 AND 2: 0
2. FEELING DOWN, DEPRESSED OR HOPELESS: NOT AT ALL

## 2023-02-09 ASSESSMENT — PAIN SCALES - GENERAL: PAINLEVEL_OUTOF10: 0

## 2023-02-10 ENCOUNTER — APPOINTMENT (OUTPATIENT)
Dept: INFUSION THERAPY | Age: 88
End: 2023-02-10
Attending: INTERNAL MEDICINE

## 2023-02-23 ENCOUNTER — OFFICE VISIT (OUTPATIENT)
Dept: CARDIOLOGY | Age: 88
End: 2023-02-23

## 2023-02-23 VITALS
DIASTOLIC BLOOD PRESSURE: 60 MMHG | BODY MASS INDEX: 23.63 KG/M2 | WEIGHT: 138.4 LBS | HEART RATE: 62 BPM | HEIGHT: 64 IN | SYSTOLIC BLOOD PRESSURE: 112 MMHG

## 2023-02-23 DIAGNOSIS — I25.10 ATHEROSCLEROSIS OF NATIVE CORONARY ARTERY OF NATIVE HEART WITHOUT ANGINA PECTORIS: ICD-10-CM

## 2023-02-23 DIAGNOSIS — N18.4 ANEMIA IN STAGE 4 CHRONIC KIDNEY DISEASE (CMD): ICD-10-CM

## 2023-02-23 DIAGNOSIS — I10 PRIMARY HYPERTENSION: Primary | ICD-10-CM

## 2023-02-23 DIAGNOSIS — N18.4 CHRONIC KIDNEY DISEASE, STAGE 4 (SEVERE) (CMD): ICD-10-CM

## 2023-02-23 DIAGNOSIS — D63.1 ANEMIA IN STAGE 4 CHRONIC KIDNEY DISEASE (CMD): ICD-10-CM

## 2023-02-23 DIAGNOSIS — E78.00 PURE HYPERCHOLESTEROLEMIA: ICD-10-CM

## 2023-02-23 PROCEDURE — 3074F SYST BP LT 130 MM HG: CPT | Performed by: INTERNAL MEDICINE

## 2023-02-23 PROCEDURE — 3078F DIAST BP <80 MM HG: CPT | Performed by: INTERNAL MEDICINE

## 2023-02-23 PROCEDURE — 99214 OFFICE O/P EST MOD 30 MIN: CPT | Performed by: INTERNAL MEDICINE

## 2023-02-23 PROCEDURE — 93000 ELECTROCARDIOGRAM COMPLETE: CPT | Performed by: INTERNAL MEDICINE

## 2023-02-23 ASSESSMENT — ENCOUNTER SYMPTOMS
CHEST TIGHTNESS: 0
UNEXPECTED WEIGHT CHANGE: 0
ACTIVITY CHANGE: 0
CONFUSION: 0
FEVER: 0
ADENOPATHY: 0
DIZZINESS: 0
DIARRHEA: 0
VOMITING: 0
SHORTNESS OF BREATH: 0
SORE THROAT: 0
COUGH: 0
VOICE CHANGE: 0
SPEECH DIFFICULTY: 0
SEIZURES: 0

## 2023-02-23 ASSESSMENT — PATIENT HEALTH QUESTIONNAIRE - PHQ9
SUM OF ALL RESPONSES TO PHQ9 QUESTIONS 1 AND 2: 0
SUM OF ALL RESPONSES TO PHQ9 QUESTIONS 1 AND 2: 0
CLINICAL INTERPRETATION OF PHQ2 SCORE: NO FURTHER SCREENING NEEDED
2. FEELING DOWN, DEPRESSED OR HOPELESS: NOT AT ALL
1. LITTLE INTEREST OR PLEASURE IN DOING THINGS: NOT AT ALL

## 2023-02-24 ENCOUNTER — LAB SERVICES (OUTPATIENT)
Dept: LAB | Age: 88
End: 2023-02-24

## 2023-02-24 ENCOUNTER — LAB REQUISITION (OUTPATIENT)
Dept: LAB | Age: 88
End: 2023-02-24

## 2023-02-24 ENCOUNTER — HOSPITAL ENCOUNTER (OUTPATIENT)
Dept: INFUSION THERAPY | Age: 88
Discharge: STILL ADMITTED OR ADMINISTRATIVE | End: 2023-02-24
Attending: INTERNAL MEDICINE

## 2023-02-24 VITALS
TEMPERATURE: 97.9 F | RESPIRATION RATE: 18 BRPM | BODY MASS INDEX: 23.69 KG/M2 | WEIGHT: 138.01 LBS | SYSTOLIC BLOOD PRESSURE: 143 MMHG | HEART RATE: 66 BPM | OXYGEN SATURATION: 97 % | DIASTOLIC BLOOD PRESSURE: 75 MMHG

## 2023-02-24 DIAGNOSIS — N18.4 CHRONIC KIDNEY DISEASE, STAGE 4 (SEVERE) (CMD): ICD-10-CM

## 2023-02-24 DIAGNOSIS — D63.1 ANEMIA IN STAGE 4 CHRONIC KIDNEY DISEASE (CMD): Primary | ICD-10-CM

## 2023-02-24 DIAGNOSIS — D63.1 ANEMIA IN STAGE 4 CHRONIC KIDNEY DISEASE (CMD): ICD-10-CM

## 2023-02-24 DIAGNOSIS — D63.1 ANEMIA IN CHRONIC KIDNEY DISEASE (CODE): ICD-10-CM

## 2023-02-24 DIAGNOSIS — N18.4 ANEMIA IN STAGE 4 CHRONIC KIDNEY DISEASE (CMD): ICD-10-CM

## 2023-02-24 DIAGNOSIS — N18.4 ANEMIA IN STAGE 4 CHRONIC KIDNEY DISEASE (CMD): Primary | ICD-10-CM

## 2023-02-24 LAB
HCT VFR BLD CALC: 32.6 % (ref 39–51)
HGB BLD-MCNC: 9.6 G/DL (ref 13–17)

## 2023-02-24 PROCEDURE — 85014 HEMATOCRIT: CPT | Performed by: CLINICAL MEDICAL LABORATORY

## 2023-02-24 PROCEDURE — 10002800 HB RX 250 W HCPCS: Performed by: INTERNAL MEDICINE

## 2023-02-24 PROCEDURE — 96372 THER/PROPH/DIAG INJ SC/IM: CPT

## 2023-02-24 PROCEDURE — 85018 HEMOGLOBIN: CPT | Performed by: CLINICAL MEDICAL LABORATORY

## 2023-02-24 RX ADMIN — EPOETIN ALFA-EPBX 10000 UNITS: 10000 INJECTION, SOLUTION INTRAVENOUS; SUBCUTANEOUS at 12:02

## 2023-02-24 ASSESSMENT — PATIENT HEALTH QUESTIONNAIRE - PHQ9
CLINICAL INTERPRETATION OF PHQ2 SCORE: NO FURTHER SCREENING NEEDED
SUM OF ALL RESPONSES TO PHQ9 QUESTIONS 1 AND 2: 0
1. LITTLE INTEREST OR PLEASURE IN DOING THINGS: NOT AT ALL
SUM OF ALL RESPONSES TO PHQ9 QUESTIONS 1 AND 2: 0
2. FEELING DOWN, DEPRESSED OR HOPELESS: NOT AT ALL

## 2023-02-24 ASSESSMENT — PAIN SCALES - GENERAL: PAINLEVEL_OUTOF10: 0

## 2023-03-10 ENCOUNTER — HOSPITAL ENCOUNTER (OUTPATIENT)
Dept: INFUSION THERAPY | Age: 88
Discharge: STILL ADMITTED OR ADMINISTRATIVE | End: 2023-03-10
Attending: INTERNAL MEDICINE

## 2023-03-10 ENCOUNTER — LAB SERVICES (OUTPATIENT)
Dept: LAB | Age: 88
End: 2023-03-10

## 2023-03-10 ENCOUNTER — TELEPHONE (OUTPATIENT)
Dept: CARDIOLOGY | Age: 88
End: 2023-03-10

## 2023-03-10 VITALS
HEART RATE: 67 BPM | TEMPERATURE: 97.9 F | BODY MASS INDEX: 24.11 KG/M2 | DIASTOLIC BLOOD PRESSURE: 79 MMHG | SYSTOLIC BLOOD PRESSURE: 138 MMHG | OXYGEN SATURATION: 98 % | WEIGHT: 140.43 LBS

## 2023-03-10 DIAGNOSIS — E78.00 PURE HYPERCHOLESTEROLEMIA: ICD-10-CM

## 2023-03-10 DIAGNOSIS — I25.10 ATHEROSCLEROSIS OF NATIVE CORONARY ARTERY OF NATIVE HEART WITHOUT ANGINA PECTORIS: ICD-10-CM

## 2023-03-10 DIAGNOSIS — D63.1 ANEMIA IN STAGE 4 CHRONIC KIDNEY DISEASE (CMD): Primary | ICD-10-CM

## 2023-03-10 DIAGNOSIS — N18.4 ANEMIA IN STAGE 4 CHRONIC KIDNEY DISEASE (CMD): Primary | ICD-10-CM

## 2023-03-10 LAB
ALBUMIN SERPL-MCNC: 3.6 G/DL (ref 3.6–5.1)
ALBUMIN/GLOB SERPL: 1.1 {RATIO} (ref 1–2.4)
ALP SERPL-CCNC: 39 UNITS/L (ref 45–117)
ALT SERPL-CCNC: 14 UNITS/L
ANION GAP SERPL CALC-SCNC: 10 MMOL/L (ref 7–19)
AST SERPL-CCNC: 7 UNITS/L
BASOPHILS # BLD: 0 K/MCL (ref 0–0.3)
BASOPHILS NFR BLD: 0 %
BILIRUB SERPL-MCNC: 0.4 MG/DL (ref 0.2–1)
BUN SERPL-MCNC: 75 MG/DL (ref 6–20)
BUN/CREAT SERPL: 25 (ref 7–25)
CALCIUM SERPL-MCNC: 8.8 MG/DL (ref 8.4–10.2)
CHLORIDE SERPL-SCNC: 114 MMOL/L (ref 97–110)
CHOLEST SERPL-MCNC: 108 MG/DL
CHOLEST/HDLC SERPL: 2.1 {RATIO}
CO2 SERPL-SCNC: 22 MMOL/L (ref 21–32)
CREAT SERPL-MCNC: 3.02 MG/DL (ref 0.67–1.17)
DEPRECATED RDW RBC: 40.7 FL (ref 39–50)
EOSINOPHIL # BLD: 0.1 K/MCL (ref 0–0.5)
EOSINOPHIL NFR BLD: 2 %
ERYTHROCYTE [DISTWIDTH] IN BLOOD: 17.3 % (ref 11–15)
FASTING DURATION TIME PATIENT: ABNORMAL H
FASTING DURATION TIME PATIENT: NORMAL H
GFR SERPLBLD BASED ON 1.73 SQ M-ARVRAT: 20 ML/MIN
GLOBULIN SER-MCNC: 3.3 G/DL (ref 2–4)
GLUCOSE SERPL-MCNC: 110 MG/DL (ref 70–99)
HCT VFR BLD CALC: 32.8 % (ref 39–51)
HDLC SERPL-MCNC: 51 MG/DL
HGB BLD-MCNC: 9.7 G/DL (ref 13–17)
IMM GRANULOCYTES # BLD AUTO: 0 K/MCL (ref 0–0.2)
IMM GRANULOCYTES # BLD: 0 %
LDLC SERPL CALC-MCNC: 44 MG/DL
LYMPHOCYTES # BLD: 1.1 K/MCL (ref 1–4)
LYMPHOCYTES NFR BLD: 16 %
MCH RBC QN AUTO: 20 PG (ref 26–34)
MCHC RBC AUTO-ENTMCNC: 29.6 G/DL (ref 32–36.5)
MCV RBC AUTO: 67.5 FL (ref 78–100)
MONOCYTES # BLD: 0.9 K/MCL (ref 0.3–0.9)
MONOCYTES NFR BLD: 13 %
NEUTROPHILS # BLD: 4.7 K/MCL (ref 1.8–7.7)
NEUTROPHILS NFR BLD: 69 %
NONHDLC SERPL-MCNC: 57 MG/DL
NRBC BLD MANUAL-RTO: 0 /100 WBC
PLATELET # BLD AUTO: 211 K/MCL (ref 140–450)
POTASSIUM SERPL-SCNC: 4.6 MMOL/L (ref 3.4–5.1)
PROT SERPL-MCNC: 6.9 G/DL (ref 6.4–8.2)
RBC # BLD: 4.86 MIL/MCL (ref 4.5–5.9)
SODIUM SERPL-SCNC: 141 MMOL/L (ref 135–145)
TRIGL SERPL-MCNC: 65 MG/DL
WBC # BLD: 6.8 K/MCL (ref 4.2–11)

## 2023-03-10 PROCEDURE — 10002800 HB RX 250 W HCPCS: Performed by: INTERNAL MEDICINE

## 2023-03-10 PROCEDURE — 36415 COLL VENOUS BLD VENIPUNCTURE: CPT | Performed by: INTERNAL MEDICINE

## 2023-03-10 PROCEDURE — 80061 LIPID PANEL: CPT | Performed by: INTERNAL MEDICINE

## 2023-03-10 PROCEDURE — 96372 THER/PROPH/DIAG INJ SC/IM: CPT

## 2023-03-10 PROCEDURE — 85025 COMPLETE CBC W/AUTO DIFF WBC: CPT | Performed by: INTERNAL MEDICINE

## 2023-03-10 PROCEDURE — 80053 COMPREHEN METABOLIC PANEL: CPT | Performed by: INTERNAL MEDICINE

## 2023-03-10 RX ADMIN — EPOETIN ALFA-EPBX 10000 UNITS: 10000 INJECTION, SOLUTION INTRAVENOUS; SUBCUTANEOUS at 11:36

## 2023-03-10 ASSESSMENT — PAIN SCALES - GENERAL: PAINLEVEL_OUTOF10: 0

## 2023-03-10 ASSESSMENT — PATIENT HEALTH QUESTIONNAIRE - PHQ9
SUM OF ALL RESPONSES TO PHQ9 QUESTIONS 1 AND 2: 0
IS PATIENT ABLE TO COMPLETE PHQ2 OR PHQ9: YES
1. LITTLE INTEREST OR PLEASURE IN DOING THINGS: NOT AT ALL
SUM OF ALL RESPONSES TO PHQ9 QUESTIONS 1 AND 2: 0
CLINICAL INTERPRETATION OF PHQ2 SCORE: NO FURTHER SCREENING NEEDED
2. FEELING DOWN, DEPRESSED OR HOPELESS: NOT AT ALL

## 2023-03-21 ENCOUNTER — LAB REQUISITION (OUTPATIENT)
Dept: LAB | Age: 88
End: 2023-03-21

## 2023-03-21 DIAGNOSIS — I49.3 VENTRICULAR PREMATURE DEPOLARIZATION: ICD-10-CM

## 2023-03-21 DIAGNOSIS — D56.3 THALASSEMIA MINOR: ICD-10-CM

## 2023-03-21 DIAGNOSIS — R73.03 PREDIABETES: ICD-10-CM

## 2023-03-21 DIAGNOSIS — E78.5 HYPERLIPIDEMIA, UNSPECIFIED: ICD-10-CM

## 2023-03-21 DIAGNOSIS — N18.4 CHRONIC KIDNEY DISEASE, STAGE 4 (SEVERE) (CMD): ICD-10-CM

## 2023-03-21 DIAGNOSIS — I25.10 ATHEROSCLEROTIC HEART DISEASE OF NATIVE CORONARY ARTERY WITHOUT ANGINA PECTORIS: ICD-10-CM

## 2023-03-24 ENCOUNTER — LAB SERVICES (OUTPATIENT)
Dept: LAB | Age: 88
End: 2023-03-24

## 2023-03-24 ENCOUNTER — HOSPITAL ENCOUNTER (OUTPATIENT)
Dept: INFUSION THERAPY | Age: 88
Discharge: STILL ADMITTED OR ADMINISTRATIVE | End: 2023-03-24
Attending: INTERNAL MEDICINE

## 2023-03-24 VITALS
HEART RATE: 71 BPM | BODY MASS INDEX: 24.75 KG/M2 | DIASTOLIC BLOOD PRESSURE: 71 MMHG | TEMPERATURE: 98.2 F | SYSTOLIC BLOOD PRESSURE: 122 MMHG | OXYGEN SATURATION: 92 % | WEIGHT: 144.18 LBS

## 2023-03-24 DIAGNOSIS — N18.4 ANEMIA IN STAGE 4 CHRONIC KIDNEY DISEASE (CMD): Primary | ICD-10-CM

## 2023-03-24 DIAGNOSIS — D63.1 ANEMIA IN STAGE 4 CHRONIC KIDNEY DISEASE (CMD): Primary | ICD-10-CM

## 2023-03-24 DIAGNOSIS — D63.1 ANEMIA IN STAGE 4 CHRONIC KIDNEY DISEASE (CMD): ICD-10-CM

## 2023-03-24 DIAGNOSIS — N18.4 ANEMIA IN STAGE 4 CHRONIC KIDNEY DISEASE (CMD): ICD-10-CM

## 2023-03-24 LAB
HCT VFR BLD CALC: 31.1 % (ref 39–51)
HGB BLD-MCNC: 9.3 G/DL (ref 13–17)

## 2023-03-24 PROCEDURE — 96372 THER/PROPH/DIAG INJ SC/IM: CPT

## 2023-03-24 PROCEDURE — 85014 HEMATOCRIT: CPT | Performed by: INTERNAL MEDICINE

## 2023-03-24 PROCEDURE — 36415 COLL VENOUS BLD VENIPUNCTURE: CPT | Performed by: INTERNAL MEDICINE

## 2023-03-24 PROCEDURE — 85018 HEMOGLOBIN: CPT | Performed by: INTERNAL MEDICINE

## 2023-03-24 PROCEDURE — 10002800 HB RX 250 W HCPCS: Performed by: INTERNAL MEDICINE

## 2023-03-24 RX ADMIN — EPOETIN ALFA-EPBX 10000 UNITS: 10000 INJECTION, SOLUTION INTRAVENOUS; SUBCUTANEOUS at 12:31

## 2023-03-24 ASSESSMENT — PATIENT HEALTH QUESTIONNAIRE - PHQ9
1. LITTLE INTEREST OR PLEASURE IN DOING THINGS: NOT AT ALL
2. FEELING DOWN, DEPRESSED OR HOPELESS: NOT AT ALL
SUM OF ALL RESPONSES TO PHQ9 QUESTIONS 1 AND 2: 0
SUM OF ALL RESPONSES TO PHQ9 QUESTIONS 1 AND 2: 0
CLINICAL INTERPRETATION OF PHQ2 SCORE: NO FURTHER SCREENING NEEDED
IS PATIENT ABLE TO COMPLETE PHQ2 OR PHQ9: YES

## 2023-03-24 ASSESSMENT — PAIN SCALES - GENERAL: PAINLEVEL_OUTOF10: 0

## 2023-03-28 ENCOUNTER — IMAGING SERVICES (OUTPATIENT)
Dept: CT IMAGING | Age: 88
End: 2023-03-28
Attending: INTERNAL MEDICINE

## 2023-03-28 ENCOUNTER — LAB SERVICES (OUTPATIENT)
Dept: LAB | Age: 88
End: 2023-03-28

## 2023-03-28 DIAGNOSIS — R06.09 DOE (DYSPNEA ON EXERTION): ICD-10-CM

## 2023-03-28 LAB
ALBUMIN SERPL-MCNC: 4 G/DL (ref 3.6–5.1)
ALBUMIN/GLOB SERPL: 1.3 {RATIO} (ref 1–2.4)
ALP SERPL-CCNC: 40 UNITS/L (ref 45–117)
ALT SERPL-CCNC: 18 UNITS/L
ANION GAP SERPL CALC-SCNC: 11 MMOL/L (ref 7–19)
APPEARANCE UR: CLEAR
AST SERPL-CCNC: 12 UNITS/L
BACTERIA #/AREA URNS HPF: ABNORMAL /HPF
BILIRUB SERPL-MCNC: 0.5 MG/DL (ref 0.2–1)
BILIRUB UR QL STRIP: NEGATIVE
BUN SERPL-MCNC: 78 MG/DL (ref 6–20)
BUN/CREAT SERPL: 24 (ref 7–25)
CALCIUM SERPL-MCNC: 9.7 MG/DL (ref 8.4–10.2)
CHLORIDE SERPL-SCNC: 110 MMOL/L (ref 97–110)
CHOLEST SERPL-MCNC: 115 MG/DL
CHOLEST/HDLC SERPL: 2.4 {RATIO}
CO2 SERPL-SCNC: 23 MMOL/L (ref 21–32)
COLOR UR: ABNORMAL
CREAT SERPL-MCNC: 3.22 MG/DL (ref 0.67–1.17)
FASTING DURATION TIME PATIENT: 8 HOURS (ref 0–999)
FASTING DURATION TIME PATIENT: 8 HOURS (ref 0–999)
FERRITIN SERPL-MCNC: 204 NG/ML (ref 26–388)
GFR SERPLBLD BASED ON 1.73 SQ M-ARVRAT: 18 ML/MIN
GLOBULIN SER-MCNC: 3 G/DL (ref 2–4)
GLUCOSE SERPL-MCNC: 112 MG/DL (ref 70–99)
GLUCOSE UR STRIP-MCNC: NEGATIVE MG/DL
HBA1C MFR BLD: 5.9 % (ref 4.5–5.6)
HDLC SERPL-MCNC: 48 MG/DL
HGB UR QL STRIP: NEGATIVE
HYALINE CASTS #/AREA URNS LPF: ABNORMAL /LPF
IRON SATN MFR SERPL: 18 % (ref 15–45)
IRON SERPL-MCNC: 47 MCG/DL (ref 65–175)
KETONES UR STRIP-MCNC: NEGATIVE MG/DL
LDLC SERPL CALC-MCNC: 51 MG/DL
LEUKOCYTE ESTERASE UR QL STRIP: NEGATIVE
NITRITE UR QL STRIP: NEGATIVE
NONHDLC SERPL-MCNC: 67 MG/DL
PH UR STRIP: 6 [PH] (ref 5–7)
POTASSIUM SERPL-SCNC: 4.4 MMOL/L (ref 3.4–5.1)
PROT SERPL-MCNC: 7 G/DL (ref 6.4–8.2)
PROT UR STRIP-MCNC: 100 MG/DL
RBC #/AREA URNS HPF: ABNORMAL /HPF
SODIUM SERPL-SCNC: 140 MMOL/L (ref 135–145)
SP GR UR STRIP: 1.01 (ref 1–1.03)
SQUAMOUS #/AREA URNS HPF: ABNORMAL /HPF
T4 FREE SERPL-MCNC: 1 NG/DL (ref 0.8–1.5)
TIBC SERPL-MCNC: 260 MCG/DL (ref 250–450)
TRIGL SERPL-MCNC: 80 MG/DL
TSH SERPL-ACNC: 1.24 MCUNITS/ML (ref 0.35–5)
UROBILINOGEN UR STRIP-MCNC: 0.2 MG/DL
WBC #/AREA URNS HPF: ABNORMAL /HPF

## 2023-03-28 PROCEDURE — 83540 ASSAY OF IRON: CPT | Performed by: CLINICAL MEDICAL LABORATORY

## 2023-03-28 PROCEDURE — 81001 URINALYSIS AUTO W/SCOPE: CPT | Performed by: CLINICAL MEDICAL LABORATORY

## 2023-03-28 PROCEDURE — 82570 ASSAY OF URINE CREATININE: CPT | Performed by: CLINICAL MEDICAL LABORATORY

## 2023-03-28 PROCEDURE — 83036 HEMOGLOBIN GLYCOSYLATED A1C: CPT | Performed by: CLINICAL MEDICAL LABORATORY

## 2023-03-28 PROCEDURE — 82607 VITAMIN B-12: CPT | Performed by: CLINICAL MEDICAL LABORATORY

## 2023-03-28 PROCEDURE — 82746 ASSAY OF FOLIC ACID SERUM: CPT | Performed by: CLINICAL MEDICAL LABORATORY

## 2023-03-28 PROCEDURE — 82728 ASSAY OF FERRITIN: CPT | Performed by: CLINICAL MEDICAL LABORATORY

## 2023-03-28 PROCEDURE — 80050 GENERAL HEALTH PANEL: CPT | Performed by: CLINICAL MEDICAL LABORATORY

## 2023-03-28 PROCEDURE — 83550 IRON BINDING TEST: CPT | Performed by: CLINICAL MEDICAL LABORATORY

## 2023-03-28 PROCEDURE — 82043 UR ALBUMIN QUANTITATIVE: CPT | Performed by: CLINICAL MEDICAL LABORATORY

## 2023-03-28 PROCEDURE — 71250 CT THORAX DX C-: CPT | Performed by: RADIOLOGY

## 2023-03-28 PROCEDURE — 84439 ASSAY OF FREE THYROXINE: CPT | Performed by: CLINICAL MEDICAL LABORATORY

## 2023-03-28 PROCEDURE — 80061 LIPID PANEL: CPT | Performed by: CLINICAL MEDICAL LABORATORY

## 2023-03-28 PROCEDURE — 84156 ASSAY OF PROTEIN URINE: CPT | Performed by: CLINICAL MEDICAL LABORATORY

## 2023-03-29 LAB
BASOPHILS # BLD: 0 K/MCL (ref 0–0.3)
BASOPHILS NFR BLD: 0 %
CREAT UR-MCNC: 50 MG/DL
CREAT UR-MCNC: 57.4 MG/DL
DEPRECATED RDW RBC: 42.2 FL (ref 39–50)
EOSINOPHIL # BLD: 0.1 K/MCL (ref 0–0.5)
EOSINOPHIL NFR BLD: 1 %
ERYTHROCYTE [DISTWIDTH] IN BLOOD: 17.3 % (ref 11–15)
FOLATE SERPL-MCNC: 8.6 NG/ML
HCT VFR BLD CALC: 33.3 % (ref 39–51)
HGB BLD-MCNC: 9.9 G/DL (ref 13–17)
IMM GRANULOCYTES # BLD AUTO: 0 K/MCL (ref 0–0.2)
IMM GRANULOCYTES # BLD: 0 %
LYMPHOCYTES # BLD: 1.4 K/MCL (ref 1–4)
LYMPHOCYTES NFR BLD: 16 %
MCH RBC QN AUTO: 20.6 PG (ref 26–34)
MCHC RBC AUTO-ENTMCNC: 29.7 G/DL (ref 32–36.5)
MCV RBC AUTO: 69.4 FL (ref 78–100)
MICROALBUMIN UR-MCNC: 109 MG/DL
MICROALBUMIN/CREAT UR: 2180 MG/G
MONOCYTES # BLD: 0.9 K/MCL (ref 0.3–0.9)
MONOCYTES NFR BLD: 10 %
NEUTROPHILS # BLD: 6.3 K/MCL (ref 1.8–7.7)
NEUTROPHILS NFR BLD: 73 %
NRBC BLD MANUAL-RTO: 0 /100 WBC
PLATELET # BLD AUTO: 226 K/MCL (ref 140–450)
PROT UR-MCNC: 138 MG/DL
PROT/CREAT UR: 2404 MGPR/GCR
RBC # BLD: 4.8 MIL/MCL (ref 4.5–5.9)
VIT B12 SERPL-MCNC: >2000 PG/ML (ref 211–911)
WBC # BLD: 8.7 K/MCL (ref 4.2–11)

## 2023-04-07 ENCOUNTER — HOSPITAL ENCOUNTER (OUTPATIENT)
Dept: INFUSION THERAPY | Age: 88
Discharge: STILL ADMITTED OR ADMINISTRATIVE | End: 2023-04-07
Attending: INTERNAL MEDICINE

## 2023-04-07 ENCOUNTER — LAB SERVICES (OUTPATIENT)
Dept: LAB | Age: 88
End: 2023-04-07

## 2023-04-07 VITALS
BODY MASS INDEX: 24.29 KG/M2 | HEART RATE: 73 BPM | OXYGEN SATURATION: 95 % | DIASTOLIC BLOOD PRESSURE: 72 MMHG | TEMPERATURE: 98.1 F | SYSTOLIC BLOOD PRESSURE: 134 MMHG | WEIGHT: 141.54 LBS

## 2023-04-07 DIAGNOSIS — D63.1 ANEMIA IN STAGE 4 CHRONIC KIDNEY DISEASE (CMD): Primary | ICD-10-CM

## 2023-04-07 DIAGNOSIS — D63.1 ANEMIA IN STAGE 4 CHRONIC KIDNEY DISEASE (CMD): ICD-10-CM

## 2023-04-07 DIAGNOSIS — N18.4 ANEMIA IN STAGE 4 CHRONIC KIDNEY DISEASE (CMD): ICD-10-CM

## 2023-04-07 DIAGNOSIS — N18.4 ANEMIA IN STAGE 4 CHRONIC KIDNEY DISEASE (CMD): Primary | ICD-10-CM

## 2023-04-07 LAB
HCT VFR BLD CALC: 32.6 % (ref 39–51)
HGB BLD-MCNC: 9.9 G/DL (ref 13–17)

## 2023-04-07 PROCEDURE — 36415 COLL VENOUS BLD VENIPUNCTURE: CPT | Performed by: INTERNAL MEDICINE

## 2023-04-07 PROCEDURE — 85014 HEMATOCRIT: CPT | Performed by: INTERNAL MEDICINE

## 2023-04-07 PROCEDURE — 10002800 HB RX 250 W HCPCS: Performed by: INTERNAL MEDICINE

## 2023-04-07 PROCEDURE — 96372 THER/PROPH/DIAG INJ SC/IM: CPT

## 2023-04-07 PROCEDURE — 85018 HEMOGLOBIN: CPT | Performed by: INTERNAL MEDICINE

## 2023-04-07 RX ADMIN — EPOETIN ALFA-EPBX 10000 UNITS: 10000 INJECTION, SOLUTION INTRAVENOUS; SUBCUTANEOUS at 11:46

## 2023-04-07 ASSESSMENT — PATIENT HEALTH QUESTIONNAIRE - PHQ9
2. FEELING DOWN, DEPRESSED OR HOPELESS: NOT AT ALL
IS PATIENT ABLE TO COMPLETE PHQ2 OR PHQ9: YES
SUM OF ALL RESPONSES TO PHQ9 QUESTIONS 1 AND 2: 0
SUM OF ALL RESPONSES TO PHQ9 QUESTIONS 1 AND 2: 0
1. LITTLE INTEREST OR PLEASURE IN DOING THINGS: NOT AT ALL
CLINICAL INTERPRETATION OF PHQ2 SCORE: NO FURTHER SCREENING NEEDED

## 2023-04-07 ASSESSMENT — PAIN SCALES - GENERAL: PAINLEVEL_OUTOF10: 0

## 2023-04-20 ENCOUNTER — TELEPHONE (OUTPATIENT)
Dept: INFUSION THERAPY | Age: 88
End: 2023-04-20

## 2023-04-21 ENCOUNTER — LAB SERVICES (OUTPATIENT)
Dept: LAB | Age: 88
End: 2023-04-21

## 2023-04-21 ENCOUNTER — HOSPITAL ENCOUNTER (OUTPATIENT)
Dept: INFUSION THERAPY | Age: 88
Discharge: STILL ADMITTED OR ADMINISTRATIVE | End: 2023-04-21
Attending: INTERNAL MEDICINE

## 2023-04-21 VITALS
HEART RATE: 68 BPM | WEIGHT: 143.3 LBS | BODY MASS INDEX: 24.6 KG/M2 | DIASTOLIC BLOOD PRESSURE: 74 MMHG | SYSTOLIC BLOOD PRESSURE: 134 MMHG | OXYGEN SATURATION: 96 % | TEMPERATURE: 98.2 F

## 2023-04-21 DIAGNOSIS — D63.1 ANEMIA IN STAGE 4 CHRONIC KIDNEY DISEASE (CMD): ICD-10-CM

## 2023-04-21 DIAGNOSIS — N18.4 ANEMIA IN STAGE 4 CHRONIC KIDNEY DISEASE (CMD): ICD-10-CM

## 2023-04-21 DIAGNOSIS — N18.4 ANEMIA IN STAGE 4 CHRONIC KIDNEY DISEASE (CMD): Primary | ICD-10-CM

## 2023-04-21 DIAGNOSIS — D63.1 ANEMIA IN STAGE 4 CHRONIC KIDNEY DISEASE (CMD): Primary | ICD-10-CM

## 2023-04-21 LAB
HCT VFR BLD CALC: 32 % (ref 39–51)
HGB BLD-MCNC: 9.7 G/DL (ref 13–17)

## 2023-04-21 PROCEDURE — 85014 HEMATOCRIT: CPT | Performed by: INTERNAL MEDICINE

## 2023-04-21 PROCEDURE — 10002800 HB RX 250 W HCPCS: Performed by: INTERNAL MEDICINE

## 2023-04-21 PROCEDURE — 36415 COLL VENOUS BLD VENIPUNCTURE: CPT | Performed by: INTERNAL MEDICINE

## 2023-04-21 PROCEDURE — 96372 THER/PROPH/DIAG INJ SC/IM: CPT

## 2023-04-21 PROCEDURE — 85018 HEMOGLOBIN: CPT | Performed by: INTERNAL MEDICINE

## 2023-04-21 RX ADMIN — EPOETIN ALFA-EPBX 10000 UNITS: 10000 INJECTION, SOLUTION INTRAVENOUS; SUBCUTANEOUS at 11:50

## 2023-04-21 ASSESSMENT — PATIENT HEALTH QUESTIONNAIRE - PHQ9
CLINICAL INTERPRETATION OF PHQ2 SCORE: NO FURTHER SCREENING NEEDED
2. FEELING DOWN, DEPRESSED OR HOPELESS: NOT AT ALL
SUM OF ALL RESPONSES TO PHQ9 QUESTIONS 1 AND 2: 0
SUM OF ALL RESPONSES TO PHQ9 QUESTIONS 1 AND 2: 0
IS PATIENT ABLE TO COMPLETE PHQ2 OR PHQ9: YES
1. LITTLE INTEREST OR PLEASURE IN DOING THINGS: NOT AT ALL

## 2023-04-21 ASSESSMENT — PAIN SCALES - GENERAL: PAINLEVEL_OUTOF10: 0

## 2023-04-26 ENCOUNTER — LAB SERVICES (OUTPATIENT)
Dept: LAB | Age: 88
End: 2023-04-26

## 2023-04-26 ENCOUNTER — LAB REQUISITION (OUTPATIENT)
Dept: LAB | Age: 88
End: 2023-04-26

## 2023-04-26 DIAGNOSIS — I12.9 HYPERTENSIVE CHRONIC KIDNEY DISEASE WITH STAGE 1 THROUGH STAGE 4 CHRONIC KIDNEY DISEASE, OR UNSPECIFIED CHRONIC KIDNEY DISEASE: ICD-10-CM

## 2023-04-26 DIAGNOSIS — N18.4 CHRONIC KIDNEY DISEASE, STAGE 4 (SEVERE) (CMD): ICD-10-CM

## 2023-04-26 DIAGNOSIS — R80.9 PROTEINURIA, UNSPECIFIED: ICD-10-CM

## 2023-04-26 DIAGNOSIS — D63.1 ANEMIA IN CHRONIC KIDNEY DISEASE (CODE): ICD-10-CM

## 2023-04-26 DIAGNOSIS — E78.5 HYPERLIPIDEMIA, UNSPECIFIED: ICD-10-CM

## 2023-04-26 DIAGNOSIS — I25.10 ATHEROSCLEROTIC HEART DISEASE OF NATIVE CORONARY ARTERY WITHOUT ANGINA PECTORIS: ICD-10-CM

## 2023-04-26 DIAGNOSIS — N40.0 BENIGN PROSTATIC HYPERPLASIA WITHOUT LOWER URINARY TRACT SYMPTOMS: ICD-10-CM

## 2023-04-26 LAB
25(OH)D3+25(OH)D2 SERPL-MCNC: 42.8 NG/ML (ref 30–100)
ALBUMIN SERPL-MCNC: 3.9 G/DL (ref 3.6–5.1)
ALBUMIN/GLOB SERPL: 1.4 {RATIO} (ref 1–2.4)
ALP SERPL-CCNC: 39 UNITS/L (ref 45–117)
ALT SERPL-CCNC: 15 UNITS/L
ANION GAP SERPL CALC-SCNC: 13 MMOL/L (ref 7–19)
AST SERPL-CCNC: 11 UNITS/L
BASOPHILS # BLD: 0 K/MCL (ref 0–0.3)
BASOPHILS NFR BLD: 0 %
BILIRUB SERPL-MCNC: 0.4 MG/DL (ref 0.2–1)
BUN SERPL-MCNC: 77 MG/DL (ref 6–20)
BUN/CREAT SERPL: 25 (ref 7–25)
CALCIUM SERPL-MCNC: 8.9 MG/DL (ref 8.4–10.2)
CHLORIDE SERPL-SCNC: 112 MMOL/L (ref 97–110)
CO2 SERPL-SCNC: 20 MMOL/L (ref 21–32)
CREAT SERPL-MCNC: 3.02 MG/DL (ref 0.67–1.17)
DEPRECATED RDW RBC: 41.5 FL (ref 39–50)
EOSINOPHIL # BLD: 0.1 K/MCL (ref 0–0.5)
EOSINOPHIL NFR BLD: 1 %
ERYTHROCYTE [DISTWIDTH] IN BLOOD: 17.8 % (ref 11–15)
FASTING DURATION TIME PATIENT: ABNORMAL H
FERRITIN SERPL-MCNC: 179 NG/ML (ref 26–388)
GFR SERPLBLD BASED ON 1.73 SQ M-ARVRAT: 20 ML/MIN
GLOBULIN SER-MCNC: 2.7 G/DL (ref 2–4)
GLUCOSE SERPL-MCNC: 152 MG/DL (ref 70–99)
HCT VFR BLD CALC: 33 % (ref 39–51)
HGB BLD-MCNC: 9.8 G/DL (ref 13–17)
IMM GRANULOCYTES # BLD AUTO: 0 K/MCL (ref 0–0.2)
IMM GRANULOCYTES # BLD: 0 %
IRON SATN MFR SERPL: 18 % (ref 15–45)
IRON SERPL-MCNC: 45 MCG/DL (ref 65–175)
LYMPHOCYTES # BLD: 1 K/MCL (ref 1–4)
LYMPHOCYTES NFR BLD: 13 %
MCH RBC QN AUTO: 20.1 PG (ref 26–34)
MCHC RBC AUTO-ENTMCNC: 29.7 G/DL (ref 32–36.5)
MCV RBC AUTO: 67.8 FL (ref 78–100)
MONOCYTES # BLD: 0.7 K/MCL (ref 0.3–0.9)
MONOCYTES NFR BLD: 10 %
NEUTROPHILS # BLD: 5.4 K/MCL (ref 1.8–7.7)
NEUTROPHILS NFR BLD: 76 %
NRBC BLD MANUAL-RTO: 0 /100 WBC
PHOSPHATE SERPL-MCNC: 3.6 MG/DL (ref 2.4–4.7)
PLATELET # BLD AUTO: 212 K/MCL (ref 140–450)
POTASSIUM SERPL-SCNC: 4.6 MMOL/L (ref 3.4–5.1)
PROT SERPL-MCNC: 6.6 G/DL (ref 6.4–8.2)
RBC # BLD: 4.87 MIL/MCL (ref 4.5–5.9)
SODIUM SERPL-SCNC: 140 MMOL/L (ref 135–145)
TIBC SERPL-MCNC: 252 MCG/DL (ref 250–450)
WBC # BLD: 7.2 K/MCL (ref 4.2–11)

## 2023-04-26 PROCEDURE — 83540 ASSAY OF IRON: CPT | Performed by: CLINICAL MEDICAL LABORATORY

## 2023-04-26 PROCEDURE — 85025 COMPLETE CBC W/AUTO DIFF WBC: CPT | Performed by: CLINICAL MEDICAL LABORATORY

## 2023-04-26 PROCEDURE — 82728 ASSAY OF FERRITIN: CPT | Performed by: CLINICAL MEDICAL LABORATORY

## 2023-04-26 PROCEDURE — 82306 VITAMIN D 25 HYDROXY: CPT | Performed by: CLINICAL MEDICAL LABORATORY

## 2023-04-26 PROCEDURE — 82570 ASSAY OF URINE CREATININE: CPT | Performed by: CLINICAL MEDICAL LABORATORY

## 2023-04-26 PROCEDURE — 80053 COMPREHEN METABOLIC PANEL: CPT | Performed by: CLINICAL MEDICAL LABORATORY

## 2023-04-26 PROCEDURE — 83550 IRON BINDING TEST: CPT | Performed by: CLINICAL MEDICAL LABORATORY

## 2023-04-26 PROCEDURE — 82043 UR ALBUMIN QUANTITATIVE: CPT | Performed by: CLINICAL MEDICAL LABORATORY

## 2023-04-26 PROCEDURE — 83970 ASSAY OF PARATHORMONE: CPT | Performed by: CLINICAL MEDICAL LABORATORY

## 2023-04-26 PROCEDURE — 84100 ASSAY OF PHOSPHORUS: CPT | Performed by: CLINICAL MEDICAL LABORATORY

## 2023-04-27 LAB
CREAT UR-MCNC: 81.1 MG/DL
MICROALBUMIN UR-MCNC: 129 MG/DL
MICROALBUMIN/CREAT UR: 1590.6 MG/G
PTH-INTACT SERPL-MCNC: 48 PG/ML (ref 19–88)

## 2023-05-05 ENCOUNTER — HOSPITAL ENCOUNTER (OUTPATIENT)
Dept: INFUSION THERAPY | Age: 88
Discharge: STILL ADMITTED OR ADMINISTRATIVE | End: 2023-05-05
Attending: INTERNAL MEDICINE

## 2023-05-05 ENCOUNTER — LAB SERVICES (OUTPATIENT)
Dept: LAB | Age: 88
End: 2023-05-05

## 2023-05-05 VITALS
TEMPERATURE: 97.7 F | DIASTOLIC BLOOD PRESSURE: 73 MMHG | RESPIRATION RATE: 22 BRPM | WEIGHT: 139.55 LBS | BODY MASS INDEX: 23.95 KG/M2 | HEART RATE: 47 BPM | SYSTOLIC BLOOD PRESSURE: 141 MMHG | OXYGEN SATURATION: 96 %

## 2023-05-05 DIAGNOSIS — D63.1 ANEMIA IN STAGE 4 CHRONIC KIDNEY DISEASE (CMD): Primary | ICD-10-CM

## 2023-05-05 DIAGNOSIS — D63.1 ANEMIA IN STAGE 4 CHRONIC KIDNEY DISEASE (CMD): ICD-10-CM

## 2023-05-05 DIAGNOSIS — N18.4 ANEMIA IN STAGE 4 CHRONIC KIDNEY DISEASE (CMD): Primary | ICD-10-CM

## 2023-05-05 DIAGNOSIS — N18.4 ANEMIA IN STAGE 4 CHRONIC KIDNEY DISEASE (CMD): ICD-10-CM

## 2023-05-05 LAB
HCT VFR BLD CALC: 34.1 % (ref 39–51)
HGB BLD-MCNC: 10.2 G/DL (ref 13–17)

## 2023-05-05 PROCEDURE — 85018 HEMOGLOBIN: CPT | Performed by: INTERNAL MEDICINE

## 2023-05-05 PROCEDURE — 85014 HEMATOCRIT: CPT | Performed by: INTERNAL MEDICINE

## 2023-05-05 PROCEDURE — 36415 COLL VENOUS BLD VENIPUNCTURE: CPT | Performed by: INTERNAL MEDICINE

## 2023-05-05 PROCEDURE — 10002800 HB RX 250 W HCPCS: Performed by: INTERNAL MEDICINE

## 2023-05-05 PROCEDURE — 96372 THER/PROPH/DIAG INJ SC/IM: CPT

## 2023-05-05 RX ADMIN — EPOETIN ALFA-EPBX 10000 UNITS: 10000 INJECTION, SOLUTION INTRAVENOUS; SUBCUTANEOUS at 15:18

## 2023-05-05 ASSESSMENT — PAIN SCALES - GENERAL: PAINLEVEL_OUTOF10: 0

## 2023-05-05 ASSESSMENT — PATIENT HEALTH QUESTIONNAIRE - PHQ9
SUM OF ALL RESPONSES TO PHQ9 QUESTIONS 1 AND 2: 0
SUM OF ALL RESPONSES TO PHQ9 QUESTIONS 1 AND 2: 0
1. LITTLE INTEREST OR PLEASURE IN DOING THINGS: NOT AT ALL
2. FEELING DOWN, DEPRESSED OR HOPELESS: NOT AT ALL
CLINICAL INTERPRETATION OF PHQ2 SCORE: NO FURTHER SCREENING NEEDED

## 2023-05-15 DIAGNOSIS — D63.1 ANEMIA IN STAGE 4 CHRONIC KIDNEY DISEASE (CMD): Primary | ICD-10-CM

## 2023-05-15 DIAGNOSIS — N18.4 ANEMIA IN STAGE 4 CHRONIC KIDNEY DISEASE (CMD): Primary | ICD-10-CM

## 2023-05-15 RX ORDER — 0.9 % SODIUM CHLORIDE 0.9 %
2 VIAL (ML) INJECTION PRN
Status: CANCELLED | OUTPATIENT
Start: 2023-05-15

## 2023-05-17 ENCOUNTER — HOSPITAL ENCOUNTER (OUTPATIENT)
Dept: INFUSION THERAPY | Age: 88
Discharge: STILL ADMITTED OR ADMINISTRATIVE | End: 2023-05-17
Attending: INTERNAL MEDICINE

## 2023-05-17 VITALS
BODY MASS INDEX: 24.33 KG/M2 | SYSTOLIC BLOOD PRESSURE: 149 MMHG | DIASTOLIC BLOOD PRESSURE: 78 MMHG | HEART RATE: 68 BPM | WEIGHT: 141.76 LBS | TEMPERATURE: 98.4 F | RESPIRATION RATE: 22 BRPM | OXYGEN SATURATION: 96 %

## 2023-05-17 DIAGNOSIS — N18.4 CHRONIC KIDNEY DISEASE, STAGE 4 (SEVERE) (CMD): Primary | ICD-10-CM

## 2023-05-17 PROCEDURE — 10002800 HB RX 250 W HCPCS: Performed by: INTERNAL MEDICINE

## 2023-05-17 PROCEDURE — 96374 THER/PROPH/DIAG INJ IV PUSH: CPT

## 2023-05-17 RX ORDER — 0.9 % SODIUM CHLORIDE 0.9 %
2 VIAL (ML) INJECTION PRN
Status: DISCONTINUED | OUTPATIENT
Start: 2023-05-17 | End: 2023-05-19 | Stop reason: HOSPADM

## 2023-05-17 RX ORDER — 0.9 % SODIUM CHLORIDE 0.9 %
2 VIAL (ML) INJECTION PRN
Status: CANCELLED | OUTPATIENT
Start: 2023-05-17

## 2023-05-17 RX ADMIN — IRON SUCROSE 200 MG: 20 INJECTION, SOLUTION INTRAVENOUS at 14:47

## 2023-05-17 ASSESSMENT — PATIENT HEALTH QUESTIONNAIRE - PHQ9
SUM OF ALL RESPONSES TO PHQ9 QUESTIONS 1 AND 2: 0
2. FEELING DOWN, DEPRESSED OR HOPELESS: NOT AT ALL
1. LITTLE INTEREST OR PLEASURE IN DOING THINGS: NOT AT ALL
SUM OF ALL RESPONSES TO PHQ9 QUESTIONS 1 AND 2: 0
CLINICAL INTERPRETATION OF PHQ2 SCORE: NO FURTHER SCREENING NEEDED

## 2023-05-17 ASSESSMENT — PAIN SCALES - GENERAL: PAINLEVEL_OUTOF10: 0

## 2023-05-19 ENCOUNTER — LAB SERVICES (OUTPATIENT)
Dept: LAB | Age: 88
End: 2023-05-19

## 2023-05-19 ENCOUNTER — HOSPITAL ENCOUNTER (OUTPATIENT)
Dept: INFUSION THERAPY | Age: 88
Discharge: STILL ADMITTED OR ADMINISTRATIVE | End: 2023-05-19
Attending: INTERNAL MEDICINE

## 2023-05-19 VITALS
BODY MASS INDEX: 24.22 KG/M2 | SYSTOLIC BLOOD PRESSURE: 135 MMHG | WEIGHT: 141.09 LBS | OXYGEN SATURATION: 96 % | RESPIRATION RATE: 20 BRPM | HEART RATE: 70 BPM | DIASTOLIC BLOOD PRESSURE: 69 MMHG | TEMPERATURE: 98.1 F

## 2023-05-19 DIAGNOSIS — D63.1 ANEMIA IN STAGE 4 CHRONIC KIDNEY DISEASE (CMD): Primary | ICD-10-CM

## 2023-05-19 DIAGNOSIS — D63.1 ANEMIA IN STAGE 4 CHRONIC KIDNEY DISEASE (CMD): ICD-10-CM

## 2023-05-19 DIAGNOSIS — N18.4 ANEMIA IN STAGE 4 CHRONIC KIDNEY DISEASE (CMD): ICD-10-CM

## 2023-05-19 DIAGNOSIS — N18.4 ANEMIA IN STAGE 4 CHRONIC KIDNEY DISEASE (CMD): Primary | ICD-10-CM

## 2023-05-19 LAB
HCT VFR BLD CALC: 31.7 % (ref 39–51)
HGB BLD-MCNC: 9.7 G/DL (ref 13–17)

## 2023-05-19 PROCEDURE — 10002800 HB RX 250 W HCPCS: Performed by: INTERNAL MEDICINE

## 2023-05-19 PROCEDURE — 96372 THER/PROPH/DIAG INJ SC/IM: CPT | Performed by: INTERNAL MEDICINE

## 2023-05-19 PROCEDURE — 85018 HEMOGLOBIN: CPT | Performed by: INTERNAL MEDICINE

## 2023-05-19 PROCEDURE — 85014 HEMATOCRIT: CPT | Performed by: INTERNAL MEDICINE

## 2023-05-19 PROCEDURE — 36415 COLL VENOUS BLD VENIPUNCTURE: CPT | Performed by: INTERNAL MEDICINE

## 2023-05-19 RX ADMIN — EPOETIN ALFA-EPBX 10000 UNITS: 10000 INJECTION, SOLUTION INTRAVENOUS; SUBCUTANEOUS at 11:21

## 2023-05-19 ASSESSMENT — PAIN SCALES - GENERAL: PAINLEVEL_OUTOF10: 0

## 2023-05-19 ASSESSMENT — PATIENT HEALTH QUESTIONNAIRE - PHQ9
SUM OF ALL RESPONSES TO PHQ9 QUESTIONS 1 AND 2: 0
CLINICAL INTERPRETATION OF PHQ2 SCORE: NO FURTHER SCREENING NEEDED

## 2023-05-24 ENCOUNTER — HOSPITAL ENCOUNTER (OUTPATIENT)
Dept: INFUSION THERAPY | Age: 88
Discharge: STILL ADMITTED OR ADMINISTRATIVE | End: 2023-05-24
Attending: INTERNAL MEDICINE

## 2023-05-24 VITALS
TEMPERATURE: 98.2 F | SYSTOLIC BLOOD PRESSURE: 128 MMHG | BODY MASS INDEX: 24.22 KG/M2 | OXYGEN SATURATION: 95 % | HEART RATE: 71 BPM | RESPIRATION RATE: 16 BRPM | DIASTOLIC BLOOD PRESSURE: 57 MMHG | WEIGHT: 141.09 LBS

## 2023-05-24 DIAGNOSIS — N18.4 CHRONIC KIDNEY DISEASE, STAGE 4 (SEVERE) (CMD): Primary | ICD-10-CM

## 2023-05-24 PROCEDURE — 10002800 HB RX 250 W HCPCS: Performed by: INTERNAL MEDICINE

## 2023-05-24 PROCEDURE — 96374 THER/PROPH/DIAG INJ IV PUSH: CPT

## 2023-05-24 RX ORDER — 0.9 % SODIUM CHLORIDE 0.9 %
2 VIAL (ML) INJECTION PRN
Status: CANCELLED | OUTPATIENT
Start: 2023-05-24

## 2023-05-24 RX ORDER — 0.9 % SODIUM CHLORIDE 0.9 %
2 VIAL (ML) INJECTION PRN
Status: DISCONTINUED | OUTPATIENT
Start: 2023-05-24 | End: 2023-05-26 | Stop reason: HOSPADM

## 2023-05-24 RX ADMIN — IRON SUCROSE 200 MG: 20 INJECTION, SOLUTION INTRAVENOUS at 11:41

## 2023-05-24 ASSESSMENT — PATIENT HEALTH QUESTIONNAIRE - PHQ9
IS PATIENT ABLE TO COMPLETE PHQ2 OR PHQ9: YES
SUM OF ALL RESPONSES TO PHQ9 QUESTIONS 1 AND 2: 0
CLINICAL INTERPRETATION OF PHQ2 SCORE: NO FURTHER SCREENING NEEDED

## 2023-05-24 ASSESSMENT — PAIN SCALES - GENERAL: PAINLEVEL_OUTOF10: 0

## 2023-05-31 ENCOUNTER — HOSPITAL ENCOUNTER (OUTPATIENT)
Dept: INFUSION THERAPY | Age: 88
Discharge: STILL ADMITTED OR ADMINISTRATIVE | End: 2023-05-31
Attending: INTERNAL MEDICINE

## 2023-05-31 VITALS
SYSTOLIC BLOOD PRESSURE: 156 MMHG | RESPIRATION RATE: 16 BRPM | WEIGHT: 139.55 LBS | BODY MASS INDEX: 23.95 KG/M2 | TEMPERATURE: 98.2 F | OXYGEN SATURATION: 97 % | HEART RATE: 67 BPM | DIASTOLIC BLOOD PRESSURE: 72 MMHG

## 2023-05-31 DIAGNOSIS — N18.4 CHRONIC KIDNEY DISEASE, STAGE 4 (SEVERE) (CMD): Primary | ICD-10-CM

## 2023-05-31 PROCEDURE — 10002800 HB RX 250 W HCPCS: Performed by: INTERNAL MEDICINE

## 2023-05-31 PROCEDURE — 96374 THER/PROPH/DIAG INJ IV PUSH: CPT

## 2023-05-31 RX ORDER — 0.9 % SODIUM CHLORIDE 0.9 %
2 VIAL (ML) INJECTION PRN
Status: CANCELLED | OUTPATIENT
Start: 2023-05-31

## 2023-05-31 RX ORDER — 0.9 % SODIUM CHLORIDE 0.9 %
2 VIAL (ML) INJECTION PRN
Status: DISCONTINUED | OUTPATIENT
Start: 2023-05-31 | End: 2023-06-02 | Stop reason: HOSPADM

## 2023-05-31 RX ADMIN — IRON SUCROSE 200 MG: 20 INJECTION, SOLUTION INTRAVENOUS at 12:53

## 2023-05-31 ASSESSMENT — PATIENT HEALTH QUESTIONNAIRE - PHQ9
SUM OF ALL RESPONSES TO PHQ9 QUESTIONS 1 AND 2: 0
IS PATIENT ABLE TO COMPLETE PHQ2 OR PHQ9: YES
CLINICAL INTERPRETATION OF PHQ2 SCORE: NO FURTHER SCREENING NEEDED

## 2023-05-31 ASSESSMENT — PAIN SCALES - GENERAL: PAINLEVEL_OUTOF10: 0

## 2023-06-02 ENCOUNTER — HOSPITAL ENCOUNTER (OUTPATIENT)
Dept: INFUSION THERAPY | Age: 88
Discharge: STILL ADMITTED OR ADMINISTRATIVE | End: 2023-06-02
Attending: INTERNAL MEDICINE

## 2023-06-02 ENCOUNTER — LAB SERVICES (OUTPATIENT)
Dept: LAB | Age: 88
End: 2023-06-02

## 2023-06-02 VITALS
DIASTOLIC BLOOD PRESSURE: 75 MMHG | HEART RATE: 66 BPM | TEMPERATURE: 98.2 F | OXYGEN SATURATION: 97 % | RESPIRATION RATE: 18 BRPM | WEIGHT: 140.87 LBS | BODY MASS INDEX: 24.18 KG/M2 | SYSTOLIC BLOOD PRESSURE: 141 MMHG

## 2023-06-02 DIAGNOSIS — N18.4 ANEMIA IN STAGE 4 CHRONIC KIDNEY DISEASE (CMD): ICD-10-CM

## 2023-06-02 DIAGNOSIS — D63.1 ANEMIA IN STAGE 4 CHRONIC KIDNEY DISEASE (CMD): ICD-10-CM

## 2023-06-02 DIAGNOSIS — N18.4 ANEMIA IN STAGE 4 CHRONIC KIDNEY DISEASE (CMD): Primary | ICD-10-CM

## 2023-06-02 DIAGNOSIS — D63.1 ANEMIA IN STAGE 4 CHRONIC KIDNEY DISEASE (CMD): Primary | ICD-10-CM

## 2023-06-02 LAB
HCT VFR BLD CALC: 31.6 % (ref 39–51)
HGB BLD-MCNC: 9.5 G/DL (ref 13–17)

## 2023-06-02 PROCEDURE — 96372 THER/PROPH/DIAG INJ SC/IM: CPT | Performed by: INTERNAL MEDICINE

## 2023-06-02 PROCEDURE — 36415 COLL VENOUS BLD VENIPUNCTURE: CPT | Performed by: INTERNAL MEDICINE

## 2023-06-02 PROCEDURE — 96372 THER/PROPH/DIAG INJ SC/IM: CPT

## 2023-06-02 PROCEDURE — 85014 HEMATOCRIT: CPT | Performed by: INTERNAL MEDICINE

## 2023-06-02 PROCEDURE — 10002800 HB RX 250 W HCPCS: Performed by: INTERNAL MEDICINE

## 2023-06-02 PROCEDURE — 85018 HEMOGLOBIN: CPT | Performed by: INTERNAL MEDICINE

## 2023-06-02 RX ADMIN — EPOETIN ALFA-EPBX 10000 UNITS: 10000 INJECTION, SOLUTION INTRAVENOUS; SUBCUTANEOUS at 11:38

## 2023-06-02 ASSESSMENT — PATIENT HEALTH QUESTIONNAIRE - PHQ9
CLINICAL INTERPRETATION OF PHQ2 SCORE: NO FURTHER SCREENING NEEDED
1. LITTLE INTEREST OR PLEASURE IN DOING THINGS: NOT AT ALL
SUM OF ALL RESPONSES TO PHQ9 QUESTIONS 1 AND 2: 0
2. FEELING DOWN, DEPRESSED OR HOPELESS: NOT AT ALL
SUM OF ALL RESPONSES TO PHQ9 QUESTIONS 1 AND 2: 0

## 2023-06-02 ASSESSMENT — PAIN SCALES - GENERAL: PAINLEVEL_OUTOF10: 0

## 2023-06-07 ENCOUNTER — HOSPITAL ENCOUNTER (OUTPATIENT)
Dept: INFUSION THERAPY | Age: 88
Discharge: STILL ADMITTED OR ADMINISTRATIVE | End: 2023-06-07
Attending: INTERNAL MEDICINE

## 2023-06-07 VITALS
SYSTOLIC BLOOD PRESSURE: 152 MMHG | DIASTOLIC BLOOD PRESSURE: 72 MMHG | BODY MASS INDEX: 24.48 KG/M2 | RESPIRATION RATE: 18 BRPM | OXYGEN SATURATION: 96 % | WEIGHT: 142.64 LBS | HEART RATE: 61 BPM | TEMPERATURE: 98.4 F

## 2023-06-07 DIAGNOSIS — N18.4 CHRONIC KIDNEY DISEASE, STAGE 4 (SEVERE) (CMD): Primary | ICD-10-CM

## 2023-06-07 PROCEDURE — 10002800 HB RX 250 W HCPCS: Performed by: INTERNAL MEDICINE

## 2023-06-07 PROCEDURE — 96374 THER/PROPH/DIAG INJ IV PUSH: CPT

## 2023-06-07 RX ORDER — 0.9 % SODIUM CHLORIDE 0.9 %
2 VIAL (ML) INJECTION PRN
Status: CANCELLED | OUTPATIENT
Start: 2023-06-07

## 2023-06-07 RX ORDER — 0.9 % SODIUM CHLORIDE 0.9 %
2 VIAL (ML) INJECTION PRN
Status: DISCONTINUED | OUTPATIENT
Start: 2023-06-07 | End: 2023-06-09 | Stop reason: HOSPADM

## 2023-06-07 RX ADMIN — IRON SUCROSE 200 MG: 20 INJECTION, SOLUTION INTRAVENOUS at 12:08

## 2023-06-07 ASSESSMENT — PAIN SCALES - GENERAL: PAINLEVEL_OUTOF10: 0

## 2023-06-14 ENCOUNTER — HOSPITAL ENCOUNTER (OUTPATIENT)
Dept: INFUSION THERAPY | Age: 88
Discharge: STILL ADMITTED OR ADMINISTRATIVE | End: 2023-06-14
Attending: INTERNAL MEDICINE

## 2023-06-14 VITALS
TEMPERATURE: 98.2 F | SYSTOLIC BLOOD PRESSURE: 153 MMHG | DIASTOLIC BLOOD PRESSURE: 75 MMHG | OXYGEN SATURATION: 95 % | WEIGHT: 142.2 LBS | BODY MASS INDEX: 24.41 KG/M2 | HEART RATE: 65 BPM | RESPIRATION RATE: 16 BRPM

## 2023-06-14 DIAGNOSIS — N18.4 CHRONIC KIDNEY DISEASE, STAGE 4 (SEVERE) (CMD): Primary | ICD-10-CM

## 2023-06-14 PROCEDURE — 96374 THER/PROPH/DIAG INJ IV PUSH: CPT

## 2023-06-14 PROCEDURE — 10002800 HB RX 250 W HCPCS: Performed by: INTERNAL MEDICINE

## 2023-06-14 RX ORDER — 0.9 % SODIUM CHLORIDE 0.9 %
2 VIAL (ML) INJECTION PRN
Status: CANCELLED | OUTPATIENT
Start: 2023-06-14

## 2023-06-14 RX ORDER — 0.9 % SODIUM CHLORIDE 0.9 %
2 VIAL (ML) INJECTION PRN
Status: DISCONTINUED | OUTPATIENT
Start: 2023-06-14 | End: 2023-06-16 | Stop reason: HOSPADM

## 2023-06-14 RX ADMIN — IRON SUCROSE 200 MG: 20 INJECTION, SOLUTION INTRAVENOUS at 11:49

## 2023-06-14 ASSESSMENT — PAIN SCALES - GENERAL: PAINLEVEL_OUTOF10: 0

## 2023-06-14 ASSESSMENT — PATIENT HEALTH QUESTIONNAIRE - PHQ9
CLINICAL INTERPRETATION OF PHQ2 SCORE: NO FURTHER SCREENING NEEDED
2. FEELING DOWN, DEPRESSED OR HOPELESS: NOT AT ALL
SUM OF ALL RESPONSES TO PHQ9 QUESTIONS 1 AND 2: 0
1. LITTLE INTEREST OR PLEASURE IN DOING THINGS: NOT AT ALL
SUM OF ALL RESPONSES TO PHQ9 QUESTIONS 1 AND 2: 0
IS PATIENT ABLE TO COMPLETE PHQ2 OR PHQ9: YES

## 2023-06-16 ENCOUNTER — LAB SERVICES (OUTPATIENT)
Dept: LAB | Age: 88
End: 2023-06-16

## 2023-06-16 ENCOUNTER — HOSPITAL ENCOUNTER (OUTPATIENT)
Dept: INFUSION THERAPY | Age: 88
Discharge: STILL ADMITTED OR ADMINISTRATIVE | End: 2023-06-16
Attending: INTERNAL MEDICINE

## 2023-06-16 VITALS
HEART RATE: 53 BPM | WEIGHT: 144.18 LBS | RESPIRATION RATE: 18 BRPM | OXYGEN SATURATION: 94 % | BODY MASS INDEX: 24.75 KG/M2 | SYSTOLIC BLOOD PRESSURE: 138 MMHG | TEMPERATURE: 97.9 F | DIASTOLIC BLOOD PRESSURE: 66 MMHG

## 2023-06-16 DIAGNOSIS — N18.4 ANEMIA IN STAGE 4 CHRONIC KIDNEY DISEASE (CMD): ICD-10-CM

## 2023-06-16 DIAGNOSIS — D63.1 ANEMIA IN STAGE 4 CHRONIC KIDNEY DISEASE (CMD): Primary | ICD-10-CM

## 2023-06-16 DIAGNOSIS — N18.4 ANEMIA IN STAGE 4 CHRONIC KIDNEY DISEASE (CMD): Primary | ICD-10-CM

## 2023-06-16 DIAGNOSIS — D63.1 ANEMIA IN STAGE 4 CHRONIC KIDNEY DISEASE (CMD): ICD-10-CM

## 2023-06-16 LAB
HCT VFR BLD CALC: 33.6 % (ref 39–51)
HGB BLD-MCNC: 10 G/DL (ref 13–17)

## 2023-06-16 PROCEDURE — 96372 THER/PROPH/DIAG INJ SC/IM: CPT | Performed by: INTERNAL MEDICINE

## 2023-06-16 PROCEDURE — 36415 COLL VENOUS BLD VENIPUNCTURE: CPT | Performed by: INTERNAL MEDICINE

## 2023-06-16 PROCEDURE — 10002800 HB RX 250 W HCPCS: Performed by: INTERNAL MEDICINE

## 2023-06-16 PROCEDURE — 85014 HEMATOCRIT: CPT | Performed by: INTERNAL MEDICINE

## 2023-06-16 PROCEDURE — 85018 HEMOGLOBIN: CPT | Performed by: INTERNAL MEDICINE

## 2023-06-16 PROCEDURE — 96372 THER/PROPH/DIAG INJ SC/IM: CPT

## 2023-06-16 RX ADMIN — EPOETIN ALFA-EPBX 10000 UNITS: 10000 INJECTION, SOLUTION INTRAVENOUS; SUBCUTANEOUS at 11:43

## 2023-06-16 ASSESSMENT — PATIENT HEALTH QUESTIONNAIRE - PHQ9
2. FEELING DOWN, DEPRESSED OR HOPELESS: NOT AT ALL
SUM OF ALL RESPONSES TO PHQ9 QUESTIONS 1 AND 2: 0
SUM OF ALL RESPONSES TO PHQ9 QUESTIONS 1 AND 2: 0
CLINICAL INTERPRETATION OF PHQ2 SCORE: NO FURTHER SCREENING NEEDED
1. LITTLE INTEREST OR PLEASURE IN DOING THINGS: NOT AT ALL

## 2023-06-16 ASSESSMENT — PAIN SCALES - GENERAL: PAINLEVEL_OUTOF10: 0

## 2023-06-28 ENCOUNTER — APPOINTMENT (OUTPATIENT)
Dept: INFUSION THERAPY | Age: 88
End: 2023-06-28
Attending: INTERNAL MEDICINE

## 2023-06-30 ENCOUNTER — LAB SERVICES (OUTPATIENT)
Dept: LAB | Age: 88
End: 2023-06-30

## 2023-06-30 ENCOUNTER — HOSPITAL ENCOUNTER (OUTPATIENT)
Dept: INFUSION THERAPY | Age: 88
Discharge: STILL ADMITTED OR ADMINISTRATIVE | End: 2023-06-30
Attending: INTERNAL MEDICINE

## 2023-06-30 VITALS
SYSTOLIC BLOOD PRESSURE: 142 MMHG | TEMPERATURE: 98.4 F | RESPIRATION RATE: 16 BRPM | OXYGEN SATURATION: 96 % | WEIGHT: 141.09 LBS | HEART RATE: 64 BPM | BODY MASS INDEX: 24.22 KG/M2 | DIASTOLIC BLOOD PRESSURE: 68 MMHG

## 2023-06-30 DIAGNOSIS — N18.4 ANEMIA IN STAGE 4 CHRONIC KIDNEY DISEASE (CMD): Primary | ICD-10-CM

## 2023-06-30 DIAGNOSIS — D63.1 ANEMIA IN STAGE 4 CHRONIC KIDNEY DISEASE (CMD): ICD-10-CM

## 2023-06-30 DIAGNOSIS — N18.4 ANEMIA IN STAGE 4 CHRONIC KIDNEY DISEASE (CMD): ICD-10-CM

## 2023-06-30 DIAGNOSIS — D63.1 ANEMIA IN STAGE 4 CHRONIC KIDNEY DISEASE (CMD): Primary | ICD-10-CM

## 2023-06-30 LAB
HCT VFR BLD CALC: 33.3 % (ref 39–51)
HGB BLD-MCNC: 10 G/DL (ref 13–17)

## 2023-06-30 PROCEDURE — 36415 COLL VENOUS BLD VENIPUNCTURE: CPT | Performed by: INTERNAL MEDICINE

## 2023-06-30 PROCEDURE — 85018 HEMOGLOBIN: CPT | Performed by: INTERNAL MEDICINE

## 2023-06-30 PROCEDURE — 85014 HEMATOCRIT: CPT | Performed by: INTERNAL MEDICINE

## 2023-06-30 PROCEDURE — 96372 THER/PROPH/DIAG INJ SC/IM: CPT | Performed by: INTERNAL MEDICINE

## 2023-06-30 PROCEDURE — 10002800 HB RX 250 W HCPCS: Performed by: INTERNAL MEDICINE

## 2023-06-30 RX ADMIN — EPOETIN ALFA-EPBX 10000 UNITS: 10000 INJECTION, SOLUTION INTRAVENOUS; SUBCUTANEOUS at 11:11

## 2023-06-30 ASSESSMENT — PATIENT HEALTH QUESTIONNAIRE - PHQ9
CLINICAL INTERPRETATION OF PHQ2 SCORE: NO FURTHER SCREENING NEEDED
SUM OF ALL RESPONSES TO PHQ9 QUESTIONS 1 AND 2: 0
IS PATIENT ABLE TO COMPLETE PHQ2 OR PHQ9: YES
1. LITTLE INTEREST OR PLEASURE IN DOING THINGS: NOT AT ALL
2. FEELING DOWN, DEPRESSED OR HOPELESS: NOT AT ALL
SUM OF ALL RESPONSES TO PHQ9 QUESTIONS 1 AND 2: 0

## 2023-06-30 ASSESSMENT — PAIN SCALES - GENERAL: PAINLEVEL_OUTOF10: 0

## 2023-06-30 ASSESSMENT — PAIN SCALES - WONG BAKER: WONGBAKER_NUMERICALRESPONSE: 0

## 2023-07-14 ENCOUNTER — HOSPITAL ENCOUNTER (OUTPATIENT)
Dept: INFUSION THERAPY | Age: 88
Discharge: STILL ADMITTED OR ADMINISTRATIVE | End: 2023-07-14
Attending: INTERNAL MEDICINE

## 2023-07-14 ENCOUNTER — LAB REQUISITION (OUTPATIENT)
Dept: LAB | Age: 88
End: 2023-07-14

## 2023-07-14 VITALS
WEIGHT: 142.2 LBS | OXYGEN SATURATION: 97 % | SYSTOLIC BLOOD PRESSURE: 142 MMHG | BODY MASS INDEX: 24.41 KG/M2 | TEMPERATURE: 98.1 F | HEART RATE: 60 BPM | RESPIRATION RATE: 18 BRPM | DIASTOLIC BLOOD PRESSURE: 76 MMHG

## 2023-07-14 DIAGNOSIS — N18.4 CHRONIC KIDNEY DISEASE, STAGE 4 (SEVERE) (CMD): ICD-10-CM

## 2023-07-14 DIAGNOSIS — D63.1 ANEMIA IN STAGE 4 CHRONIC KIDNEY DISEASE (CMD): Primary | ICD-10-CM

## 2023-07-14 DIAGNOSIS — D63.1 ANEMIA IN CHRONIC KIDNEY DISEASE (CODE): ICD-10-CM

## 2023-07-14 DIAGNOSIS — N18.4 ANEMIA IN STAGE 4 CHRONIC KIDNEY DISEASE (CMD): Primary | ICD-10-CM

## 2023-07-14 LAB
HCT VFR BLD CALC: 33.3 % (ref 39–51)
HGB BLD-MCNC: 9.9 G/DL (ref 13–17)

## 2023-07-14 PROCEDURE — 10002800 HB RX 250 W HCPCS: Performed by: INTERNAL MEDICINE

## 2023-07-14 PROCEDURE — 36415 COLL VENOUS BLD VENIPUNCTURE: CPT | Performed by: CLINICAL MEDICAL LABORATORY

## 2023-07-14 PROCEDURE — 96372 THER/PROPH/DIAG INJ SC/IM: CPT | Performed by: INTERNAL MEDICINE

## 2023-07-14 PROCEDURE — 85014 HEMATOCRIT: CPT | Performed by: CLINICAL MEDICAL LABORATORY

## 2023-07-14 PROCEDURE — 85018 HEMOGLOBIN: CPT | Performed by: CLINICAL MEDICAL LABORATORY

## 2023-07-14 RX ADMIN — EPOETIN ALFA-EPBX 10000 UNITS: 10000 INJECTION, SOLUTION INTRAVENOUS; SUBCUTANEOUS at 11:23

## 2023-07-14 ASSESSMENT — PATIENT HEALTH QUESTIONNAIRE - PHQ9
CLINICAL INTERPRETATION OF PHQ2 SCORE: NO FURTHER SCREENING NEEDED
2. FEELING DOWN, DEPRESSED OR HOPELESS: NOT AT ALL
SUM OF ALL RESPONSES TO PHQ9 QUESTIONS 1 AND 2: 0
SUM OF ALL RESPONSES TO PHQ9 QUESTIONS 1 AND 2: 0
1. LITTLE INTEREST OR PLEASURE IN DOING THINGS: NOT AT ALL

## 2023-07-14 ASSESSMENT — PAIN SCALES - GENERAL: PAINLEVEL_OUTOF10: 0

## 2023-07-28 ENCOUNTER — LAB REQUISITION (OUTPATIENT)
Dept: LAB | Age: 88
End: 2023-07-28

## 2023-07-28 ENCOUNTER — HOSPITAL ENCOUNTER (OUTPATIENT)
Dept: INFUSION THERAPY | Age: 88
Discharge: STILL ADMITTED OR ADMINISTRATIVE | End: 2023-07-28
Attending: INTERNAL MEDICINE

## 2023-07-28 VITALS
RESPIRATION RATE: 16 BRPM | TEMPERATURE: 98.1 F | WEIGHT: 142.42 LBS | SYSTOLIC BLOOD PRESSURE: 134 MMHG | DIASTOLIC BLOOD PRESSURE: 76 MMHG | HEART RATE: 66 BPM | BODY MASS INDEX: 24.45 KG/M2 | OXYGEN SATURATION: 98 %

## 2023-07-28 DIAGNOSIS — N18.4 ANEMIA IN STAGE 4 CHRONIC KIDNEY DISEASE (CMD): Primary | ICD-10-CM

## 2023-07-28 DIAGNOSIS — D63.1 ANEMIA IN STAGE 4 CHRONIC KIDNEY DISEASE (CMD): Primary | ICD-10-CM

## 2023-07-28 DIAGNOSIS — D63.1 ANEMIA IN CHRONIC KIDNEY DISEASE (CODE): ICD-10-CM

## 2023-07-28 DIAGNOSIS — N18.4 CHRONIC KIDNEY DISEASE, STAGE 4 (SEVERE) (CMD): ICD-10-CM

## 2023-07-28 LAB
HCT VFR BLD CALC: 32.6 % (ref 39–51)
HGB BLD-MCNC: 9.7 G/DL (ref 13–17)

## 2023-07-28 PROCEDURE — 85018 HEMOGLOBIN: CPT | Performed by: CLINICAL MEDICAL LABORATORY

## 2023-07-28 PROCEDURE — 96372 THER/PROPH/DIAG INJ SC/IM: CPT | Performed by: INTERNAL MEDICINE

## 2023-07-28 PROCEDURE — 85014 HEMATOCRIT: CPT | Performed by: CLINICAL MEDICAL LABORATORY

## 2023-07-28 PROCEDURE — 10002800 HB RX 250 W HCPCS: Performed by: INTERNAL MEDICINE

## 2023-07-28 RX ADMIN — EPOETIN ALFA-EPBX 10000 UNITS: 10000 INJECTION, SOLUTION INTRAVENOUS; SUBCUTANEOUS at 11:27

## 2023-07-28 ASSESSMENT — PATIENT HEALTH QUESTIONNAIRE - PHQ9
SUM OF ALL RESPONSES TO PHQ9 QUESTIONS 1 AND 2: 0
CLINICAL INTERPRETATION OF PHQ2 SCORE: NO FURTHER SCREENING NEEDED
IS PATIENT ABLE TO COMPLETE PHQ2 OR PHQ9: YES

## 2023-07-28 ASSESSMENT — PAIN SCALES - GENERAL: PAINLEVEL_OUTOF10: 0

## 2023-08-11 ENCOUNTER — HOSPITAL ENCOUNTER (OUTPATIENT)
Dept: INFUSION THERAPY | Age: 88
Discharge: STILL ADMITTED OR ADMINISTRATIVE | End: 2023-08-11
Attending: INTERNAL MEDICINE

## 2023-08-11 VITALS
SYSTOLIC BLOOD PRESSURE: 150 MMHG | HEART RATE: 70 BPM | WEIGHT: 141.98 LBS | OXYGEN SATURATION: 96 % | DIASTOLIC BLOOD PRESSURE: 74 MMHG | RESPIRATION RATE: 20 BRPM | TEMPERATURE: 98.1 F | BODY MASS INDEX: 24.37 KG/M2

## 2023-08-11 DIAGNOSIS — D63.1 ANEMIA IN STAGE 4 CHRONIC KIDNEY DISEASE (CMD): ICD-10-CM

## 2023-08-11 DIAGNOSIS — N18.4 ANEMIA IN STAGE 4 CHRONIC KIDNEY DISEASE (CMD): ICD-10-CM

## 2023-08-11 DIAGNOSIS — N18.4 CHRONIC KIDNEY DISEASE, STAGE 4 (SEVERE) (CMD): Primary | ICD-10-CM

## 2023-08-11 LAB
HCT VFR BLD CALC: 34.5 % (ref 39–51)
HGB BLD-MCNC: 10.6 G/DL (ref 13–17)

## 2023-08-11 PROCEDURE — 85018 HEMOGLOBIN: CPT | Performed by: INTERNAL MEDICINE

## 2023-08-11 PROCEDURE — 36415 COLL VENOUS BLD VENIPUNCTURE: CPT

## 2023-08-11 ASSESSMENT — PATIENT HEALTH QUESTIONNAIRE - PHQ9
SUM OF ALL RESPONSES TO PHQ9 QUESTIONS 1 AND 2: 0
1. LITTLE INTEREST OR PLEASURE IN DOING THINGS: NOT AT ALL
CLINICAL INTERPRETATION OF PHQ2 SCORE: NO FURTHER SCREENING NEEDED
2. FEELING DOWN, DEPRESSED OR HOPELESS: NOT AT ALL
SUM OF ALL RESPONSES TO PHQ9 QUESTIONS 1 AND 2: 0
IS PATIENT ABLE TO COMPLETE PHQ2 OR PHQ9: YES

## 2023-08-11 ASSESSMENT — PAIN SCALES - WONG BAKER: WONGBAKER_NUMERICALRESPONSE: 0

## 2023-08-11 ASSESSMENT — PAIN SCALES - GENERAL: PAINLEVEL_OUTOF10: 0

## 2023-08-25 ENCOUNTER — LAB REQUISITION (OUTPATIENT)
Dept: LAB | Age: 88
End: 2023-08-25

## 2023-08-25 ENCOUNTER — LAB SERVICES (OUTPATIENT)
Dept: LAB | Age: 88
End: 2023-08-25

## 2023-08-25 ENCOUNTER — HOSPITAL ENCOUNTER (OUTPATIENT)
Dept: INFUSION THERAPY | Age: 88
Discharge: STILL ADMITTED OR ADMINISTRATIVE | End: 2023-08-25
Attending: INTERNAL MEDICINE

## 2023-08-25 VITALS
TEMPERATURE: 97.5 F | BODY MASS INDEX: 24.6 KG/M2 | OXYGEN SATURATION: 97 % | WEIGHT: 143.3 LBS | DIASTOLIC BLOOD PRESSURE: 75 MMHG | SYSTOLIC BLOOD PRESSURE: 153 MMHG | HEART RATE: 67 BPM

## 2023-08-25 DIAGNOSIS — N18.4 CHRONIC KIDNEY DISEASE, STAGE 4 (SEVERE) (CMD): ICD-10-CM

## 2023-08-25 DIAGNOSIS — N40.0 BENIGN PROSTATIC HYPERPLASIA WITHOUT LOWER URINARY TRACT SYMPTOMS: ICD-10-CM

## 2023-08-25 DIAGNOSIS — D63.1 ANEMIA IN STAGE 4 CHRONIC KIDNEY DISEASE (CMD): Primary | ICD-10-CM

## 2023-08-25 DIAGNOSIS — E78.5 HYPERLIPIDEMIA, UNSPECIFIED: ICD-10-CM

## 2023-08-25 DIAGNOSIS — I12.9 HYPERTENSIVE CHRONIC KIDNEY DISEASE WITH STAGE 1 THROUGH STAGE 4 CHRONIC KIDNEY DISEASE, OR UNSPECIFIED CHRONIC KIDNEY DISEASE: ICD-10-CM

## 2023-08-25 DIAGNOSIS — D63.1 ANEMIA IN CHRONIC KIDNEY DISEASE (CODE): ICD-10-CM

## 2023-08-25 DIAGNOSIS — N18.4 ANEMIA IN STAGE 4 CHRONIC KIDNEY DISEASE (CMD): Primary | ICD-10-CM

## 2023-08-25 DIAGNOSIS — D50.8 OTHER IRON DEFICIENCY ANEMIAS: ICD-10-CM

## 2023-08-25 DIAGNOSIS — R80.9 PROTEINURIA, UNSPECIFIED: ICD-10-CM

## 2023-08-25 LAB
25(OH)D3+25(OH)D2 SERPL-MCNC: 49.5 NG/ML (ref 30–100)
ALBUMIN SERPL-MCNC: 3.7 G/DL (ref 3.6–5.1)
ALBUMIN/GLOB SERPL: 1.2 {RATIO} (ref 1–2.4)
ALP SERPL-CCNC: 46 UNITS/L (ref 45–117)
ALT SERPL-CCNC: 18 UNITS/L
ANION GAP SERPL CALC-SCNC: 13 MMOL/L (ref 7–19)
AST SERPL-CCNC: 12 UNITS/L
BASOPHILS # BLD: 0 K/MCL (ref 0–0.3)
BASOPHILS NFR BLD: 0 %
BILIRUB SERPL-MCNC: 0.5 MG/DL (ref 0.2–1)
BUN SERPL-MCNC: 74 MG/DL (ref 6–20)
BUN/CREAT SERPL: 23 (ref 7–25)
CALCIUM SERPL-MCNC: 8.8 MG/DL (ref 8.4–10.2)
CHLORIDE SERPL-SCNC: 108 MMOL/L (ref 97–110)
CO2 SERPL-SCNC: 23 MMOL/L (ref 21–32)
CREAT SERPL-MCNC: 3.23 MG/DL (ref 0.67–1.17)
CREAT UR-MCNC: 45.8 MG/DL
DEPRECATED RDW RBC: 40.8 FL (ref 39–50)
EGFRCR SERPLBLD CKD-EPI 2021: 18 ML/MIN/{1.73_M2}
EOSINOPHIL # BLD: 0.1 K/MCL (ref 0–0.5)
EOSINOPHIL NFR BLD: 1 %
ERYTHROCYTE [DISTWIDTH] IN BLOOD: 16.7 % (ref 11–15)
FASTING DURATION TIME PATIENT: 0 HOURS (ref 0–999)
FERRITIN SERPL-MCNC: 513 NG/ML (ref 26–388)
GLOBULIN SER-MCNC: 3.2 G/DL (ref 2–4)
GLUCOSE SERPL-MCNC: 119 MG/DL (ref 70–99)
HCT VFR BLD CALC: 29 % (ref 39–51)
HCT VFR BLD CALC: 32.3 % (ref 39–51)
HGB BLD-MCNC: 9.2 G/DL (ref 13–17)
HGB BLD-MCNC: 9.7 G/DL (ref 13–17)
IMM GRANULOCYTES # BLD AUTO: 0 K/MCL (ref 0–0.2)
IMM GRANULOCYTES # BLD: 0 %
IRON SATN MFR SERPL: 36 % (ref 15–45)
IRON SERPL-MCNC: 91 MCG/DL (ref 65–175)
LYMPHOCYTES # BLD: 1.4 K/MCL (ref 1–4)
LYMPHOCYTES NFR BLD: 16 %
MCH RBC QN AUTO: 20.7 PG (ref 26–34)
MCHC RBC AUTO-ENTMCNC: 30 G/DL (ref 32–36.5)
MCV RBC AUTO: 68.9 FL (ref 78–100)
MICROALBUMIN UR-MCNC: 87.8 MG/DL
MICROALBUMIN/CREAT UR: 1917 MG/G
MONOCYTES # BLD: 1.1 K/MCL (ref 0.3–0.9)
MONOCYTES NFR BLD: 12 %
NEUTROPHILS # BLD: 6.4 K/MCL (ref 1.8–7.7)
NEUTROPHILS NFR BLD: 71 %
NRBC BLD MANUAL-RTO: 0 /100 WBC
PHOSPHATE SERPL-MCNC: 4 MG/DL (ref 2.4–4.7)
PLATELET # BLD AUTO: 205 K/MCL (ref 140–450)
POTASSIUM SERPL-SCNC: 3.9 MMOL/L (ref 3.4–5.1)
PROT SERPL-MCNC: 6.9 G/DL (ref 6.4–8.2)
RBC # BLD: 4.69 MIL/MCL (ref 4.5–5.9)
SODIUM SERPL-SCNC: 140 MMOL/L (ref 135–145)
TIBC SERPL-MCNC: 255 MCG/DL (ref 250–450)
WBC # BLD: 9 K/MCL (ref 4.2–11)

## 2023-08-25 PROCEDURE — 10002800 HB RX 250 W HCPCS: Performed by: INTERNAL MEDICINE

## 2023-08-25 PROCEDURE — 85025 COMPLETE CBC W/AUTO DIFF WBC: CPT | Performed by: CLINICAL MEDICAL LABORATORY

## 2023-08-25 PROCEDURE — 80053 COMPREHEN METABOLIC PANEL: CPT | Performed by: CLINICAL MEDICAL LABORATORY

## 2023-08-25 PROCEDURE — 84100 ASSAY OF PHOSPHORUS: CPT | Performed by: CLINICAL MEDICAL LABORATORY

## 2023-08-25 PROCEDURE — 85014 HEMATOCRIT: CPT | Performed by: CLINICAL MEDICAL LABORATORY

## 2023-08-25 PROCEDURE — 85018 HEMOGLOBIN: CPT | Performed by: CLINICAL MEDICAL LABORATORY

## 2023-08-25 PROCEDURE — 82570 ASSAY OF URINE CREATININE: CPT | Performed by: CLINICAL MEDICAL LABORATORY

## 2023-08-25 PROCEDURE — 83540 ASSAY OF IRON: CPT | Performed by: CLINICAL MEDICAL LABORATORY

## 2023-08-25 PROCEDURE — 82728 ASSAY OF FERRITIN: CPT | Performed by: CLINICAL MEDICAL LABORATORY

## 2023-08-25 PROCEDURE — 96372 THER/PROPH/DIAG INJ SC/IM: CPT | Performed by: INTERNAL MEDICINE

## 2023-08-25 PROCEDURE — 82043 UR ALBUMIN QUANTITATIVE: CPT | Performed by: CLINICAL MEDICAL LABORATORY

## 2023-08-25 PROCEDURE — 82306 VITAMIN D 25 HYDROXY: CPT | Performed by: CLINICAL MEDICAL LABORATORY

## 2023-08-25 PROCEDURE — 83550 IRON BINDING TEST: CPT | Performed by: CLINICAL MEDICAL LABORATORY

## 2023-08-25 RX ADMIN — EPOETIN ALFA-EPBX 10000 UNITS: 10000 INJECTION, SOLUTION INTRAVENOUS; SUBCUTANEOUS at 11:14

## 2023-08-25 ASSESSMENT — PATIENT HEALTH QUESTIONNAIRE - PHQ9
CLINICAL INTERPRETATION OF PHQ2 SCORE: NO FURTHER SCREENING NEEDED
SUM OF ALL RESPONSES TO PHQ9 QUESTIONS 1 AND 2: 0
IS PATIENT ABLE TO COMPLETE PHQ2 OR PHQ9: YES

## 2023-08-25 ASSESSMENT — PAIN SCALES - GENERAL: PAINLEVEL_OUTOF10: 0

## 2023-09-05 ENCOUNTER — OFFICE VISIT (OUTPATIENT)
Dept: NEPHROLOGY | Facility: CLINIC | Age: 88
End: 2023-09-05
Payer: MEDICARE

## 2023-09-05 VITALS — WEIGHT: 145.25 LBS | BODY MASS INDEX: 25 KG/M2 | SYSTOLIC BLOOD PRESSURE: 132 MMHG | DIASTOLIC BLOOD PRESSURE: 62 MMHG

## 2023-09-05 DIAGNOSIS — D63.1 ANEMIA IN STAGE 4 CHRONIC KIDNEY DISEASE: ICD-10-CM

## 2023-09-05 DIAGNOSIS — N18.9 VITAMIN D DEFICIENCY DUE TO CHRONIC KIDNEY DISEASE: ICD-10-CM

## 2023-09-05 DIAGNOSIS — N18.4 STAGE 4 CHRONIC KIDNEY DISEASE (HCC): Primary | ICD-10-CM

## 2023-09-05 DIAGNOSIS — E55.9 VITAMIN D DEFICIENCY DUE TO CHRONIC KIDNEY DISEASE: ICD-10-CM

## 2023-09-05 DIAGNOSIS — N18.4 ANEMIA IN STAGE 4 CHRONIC KIDNEY DISEASE: ICD-10-CM

## 2023-09-05 PROCEDURE — 99205 OFFICE O/P NEW HI 60 MIN: CPT | Performed by: INTERNAL MEDICINE

## 2023-09-05 RX ORDER — METOPROLOL SUCCINATE 25 MG/1
25 TABLET, EXTENDED RELEASE ORAL DAILY
COMMUNITY

## 2023-09-05 RX ORDER — TAMSULOSIN HYDROCHLORIDE 0.4 MG/1
0.4 CAPSULE ORAL DAILY
COMMUNITY

## 2023-09-05 RX ORDER — ISOSORBIDE MONONITRATE 30 MG/1
30 TABLET, EXTENDED RELEASE ORAL DAILY
COMMUNITY

## 2023-09-05 RX ORDER — TRAMADOL HYDROCHLORIDE 50 MG/1
50 TABLET ORAL EVERY 8 HOURS PRN
COMMUNITY

## 2023-09-05 RX ORDER — AMLODIPINE BESYLATE 5 MG/1
5 TABLET ORAL DAILY
COMMUNITY

## 2023-09-05 RX ORDER — ATORVASTATIN CALCIUM 40 MG/1
40 TABLET, FILM COATED ORAL DAILY
COMMUNITY

## 2023-09-05 RX ORDER — ALBUTEROL SULFATE 90 UG/1
1 AEROSOL, METERED RESPIRATORY (INHALATION) AS NEEDED
COMMUNITY

## 2023-09-05 RX ORDER — EPOETIN ALFA-EPBX 10000 [IU]/ML
10000 INJECTION, SOLUTION INTRAVENOUS; SUBCUTANEOUS
COMMUNITY

## 2023-09-05 RX ORDER — FAMOTIDINE 40 MG/1
40 TABLET, FILM COATED ORAL DAILY
COMMUNITY

## 2023-09-05 RX ORDER — ACETAMINOPHEN 500 MG
500 TABLET ORAL 4 TIMES DAILY
COMMUNITY

## 2023-09-05 RX ORDER — MELATONIN
325 DAILY
COMMUNITY

## 2023-09-05 RX ORDER — HYDRALAZINE HYDROCHLORIDE 100 MG/1
100 TABLET, FILM COATED ORAL 3 TIMES DAILY
COMMUNITY

## 2023-09-05 RX ORDER — TRAZODONE HYDROCHLORIDE 50 MG/1
50 TABLET ORAL DAILY
COMMUNITY

## 2023-09-05 RX ORDER — METHIMAZOLE 5 MG/1
5 TABLET ORAL DAILY
COMMUNITY

## 2023-09-05 NOTE — PROGRESS NOTES
Nephrology Consult Note      REASON FOR CONSULT:  CKD4         HPI:   Olivier Hinojosa is a 82year old male with No chief complaint on file. Trey Solid    83 y/o pleasant male with hx of HTN, CAD, DM, CKD - previously w/ [facility] who presents to establish care w/ our group  He is doing well  Denies any cp/sob  No n/v  NO edema  He has been following w/ nephrology   Has declined access planning- would prefer to get a HD cath when needs dialysis  Not evaluated for trsnaplant peviously  He is here w/ his daugher in law    Brings records w/ recent labs from 8/25/23        ROS:  See above; 12 systems reviewed and otherwise unremarkable     PMH:  Past Medical History:   Diagnosis Date    Atherosclerosis of coronary artery     Essential hypertension     Heart attack ([address]     History of angioplasty     History of quadruple bypass     Renal disorder          PSH:  No past surgical history on file. Medications (Active prior to today's visit):  No current outpatient medications on file. Allergies:  No Known Allergies    Social History:  Social History    Socioeconomic History      Marital status: Unknown    Tobacco Use      Smoking status: Former      Smokeless tobacco: Never         Family History:  No family history on file. PHYSICAL EXAM:   There were no vitals taken for this visit. Wt Readings from Last 6 Encounters:  08/05/22 : 135 lb (61.2 kg)    General: Alert and oriented in no apparent distress. HEENT: No scleral icterus, MMM  Neck: Supple, no ANDRE or thyromegaly  Cardiac: Regular rate and rhythm, S1, S2 normal, no murmur or rub  Lungs: Clear without wheezes, rales, rhonchi. Abdomen: Soft, non-tender. + bowel sounds, no palpable organomegaly  Extremities: Without clubbing, cyanosis or edema.   Neurologic:  normal affect, cranial nerves grossly intact, moving all extremities  Skin: Warm and dry, no rashes    Labs:   8/25/23  Cr 3.23  Lytes nl  Hgb 9.7  Ca nl ASSESSMENT/PLAN:   There are no diagnoses linked to this encounter. 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 2 PCKD- no family hx per patient   He has had prior discussions about dialysis  - as noted above  - he is advanced age, but overall healthy and active- will refer for transplant as well- unclear if he will qualify- he seems interested  - no med changes  - volume looks good  - labs as noted above  - vit D/iron stores normal  - will plant pt to come in for Haroldo @ [place] office  - monitor labs q 2 months; hgb monthly  Anemia - as above; on oral iron  BMD - as above; on otc vit D 1000U daily  HTN- controlled; meds reviewed; cpm  DM- controlled; per primary  CAD  HL    F/u in 2 mos; office for aranesp - placed standing order for monthly hgb check    Elder Carter MD  9/5/2023  2:11 PM

## 2023-09-08 ENCOUNTER — TELEPHONE (OUTPATIENT)
Dept: INFUSION THERAPY | Age: 88
End: 2023-09-08

## 2023-09-11 ENCOUNTER — HOSPITAL ENCOUNTER (OUTPATIENT)
Dept: ULTRASOUND IMAGING | Age: 88
Discharge: HOME | End: 2023-09-11
Attending: PREVENTIVE MEDICINE
Payer: MEDICARE

## 2023-09-11 DIAGNOSIS — N50.89 ENLARGED TESTICLE: ICD-10-CM

## 2023-09-11 PROCEDURE — 93975 VASCULAR STUDY: CPT | Performed by: PREVENTIVE MEDICINE

## 2023-09-11 PROCEDURE — 76870 US EXAM SCROTUM: CPT | Performed by: PREVENTIVE MEDICINE

## 2023-09-12 ENCOUNTER — LAB ENCOUNTER (OUTPATIENT)
Dept: LAB | Age: 88
End: 2023-09-12
Attending: INTERNAL MEDICINE
Payer: MEDICARE

## 2023-09-12 DIAGNOSIS — N18.9 VITAMIN D DEFICIENCY DUE TO CHRONIC KIDNEY DISEASE: ICD-10-CM

## 2023-09-12 DIAGNOSIS — N18.4 ANEMIA IN STAGE 4 CHRONIC KIDNEY DISEASE: ICD-10-CM

## 2023-09-12 DIAGNOSIS — N18.4 STAGE 4 CHRONIC KIDNEY DISEASE (HCC): ICD-10-CM

## 2023-09-12 DIAGNOSIS — E55.9 VITAMIN D DEFICIENCY DUE TO CHRONIC KIDNEY DISEASE: ICD-10-CM

## 2023-09-12 DIAGNOSIS — D63.1 ANEMIA IN STAGE 4 CHRONIC KIDNEY DISEASE: ICD-10-CM

## 2023-09-12 LAB
ANION GAP SERPL CALC-SCNC: 8 MMOL/L (ref 0–18)
BASOPHILS # BLD AUTO: 0.02 X10(3) UL (ref 0–0.2)
BASOPHILS NFR BLD AUTO: 0.2 %
BILIRUB UR QL STRIP.AUTO: NEGATIVE
BUN BLD-MCNC: 73 MG/DL (ref 7–18)
CALCIUM BLD-MCNC: 9.3 MG/DL (ref 8.5–10.1)
CHLORIDE SERPL-SCNC: 107 MMOL/L (ref 98–112)
CLARITY UR REFRACT.AUTO: CLEAR
CO2 SERPL-SCNC: 20 MMOL/L (ref 21–32)
COLOR UR AUTO: COLORLESS
CREAT BLD-MCNC: 3.19 MG/DL
CREAT UR-SCNC: 30.4 MG/DL
DEPRECATED HBV CORE AB SER IA-ACNC: 475.7 NG/ML
EGFRCR SERPLBLD CKD-EPI 2021: 18 ML/MIN/1.73M2 (ref 60–?)
EOSINOPHIL # BLD AUTO: 0.07 X10(3) UL (ref 0–0.7)
EOSINOPHIL NFR BLD AUTO: 0.8 %
ERYTHROCYTE [DISTWIDTH] IN BLOOD BY AUTOMATED COUNT: 16.4 %
FASTING STATUS PATIENT QL REPORTED: NO
GLUCOSE BLD-MCNC: 96 MG/DL (ref 70–99)
GLUCOSE UR STRIP.AUTO-MCNC: NORMAL MG/DL
HCT VFR BLD AUTO: 31.8 %
HGB BLD-MCNC: 9.4 G/DL
HGB BLD-MCNC: 9.4 G/DL
IMM GRANULOCYTES # BLD AUTO: 0.03 X10(3) UL (ref 0–1)
IMM GRANULOCYTES NFR BLD: 0.3 %
IRON SATN MFR SERPL: 32 %
IRON SERPL-MCNC: 91 UG/DL
KETONES UR STRIP.AUTO-MCNC: NEGATIVE MG/DL
LEUKOCYTE ESTERASE UR QL STRIP.AUTO: NEGATIVE
LYMPHOCYTES # BLD AUTO: 1.37 X10(3) UL (ref 1–4)
LYMPHOCYTES NFR BLD AUTO: 15 %
MAGNESIUM SERPL-MCNC: 2.3 MG/DL (ref 1.6–2.6)
MCH RBC QN AUTO: 20.5 PG (ref 26–34)
MCHC RBC AUTO-ENTMCNC: 29.6 G/DL (ref 31–37)
MCV RBC AUTO: 69.3 FL
MONOCYTES # BLD AUTO: 0.88 X10(3) UL (ref 0.1–1)
MONOCYTES NFR BLD AUTO: 9.6 %
NEUTROPHILS # BLD AUTO: 6.78 X10 (3) UL (ref 1.5–7.7)
NEUTROPHILS # BLD AUTO: 6.78 X10(3) UL (ref 1.5–7.7)
NEUTROPHILS NFR BLD AUTO: 74.1 %
NITRITE UR QL STRIP.AUTO: NEGATIVE
OSMOLALITY SERPL CALC.SUM OF ELEC: 301 MOSM/KG (ref 275–295)
PH UR STRIP.AUTO: 5.5 [PH] (ref 5–8)
PHOSPHATE SERPL-MCNC: 3.6 MG/DL (ref 2.5–4.9)
PLATELET # BLD AUTO: 192 10(3)UL (ref 150–450)
POTASSIUM SERPL-SCNC: 4.6 MMOL/L (ref 3.5–5.1)
PROT UR STRIP.AUTO-MCNC: 70 MG/DL
PROT UR-MCNC: 94.8 MG/DL
PTH-INTACT SERPL-MCNC: 65.6 PG/ML (ref 18.5–88)
RBC # BLD AUTO: 4.59 X10(6)UL
RBC UR QL AUTO: NEGATIVE
SODIUM SERPL-SCNC: 135 MMOL/L (ref 136–145)
SP GR UR STRIP.AUTO: 1.01 (ref 1–1.03)
TIBC SERPL-MCNC: 288 UG/DL (ref 240–450)
TRANSFERRIN SERPL-MCNC: 193 MG/DL (ref 200–360)
UROBILINOGEN UR STRIP.AUTO-MCNC: NORMAL MG/DL
VIT D+METAB SERPL-MCNC: 53.3 NG/ML (ref 30–100)
WBC # BLD AUTO: 9.2 X10(3) UL (ref 4–11)

## 2023-09-12 PROCEDURE — 82570 ASSAY OF URINE CREATININE: CPT

## 2023-09-12 PROCEDURE — 81001 URINALYSIS AUTO W/SCOPE: CPT

## 2023-09-12 PROCEDURE — 83550 IRON BINDING TEST: CPT

## 2023-09-12 PROCEDURE — 80048 BASIC METABOLIC PNL TOTAL CA: CPT

## 2023-09-12 PROCEDURE — 83540 ASSAY OF IRON: CPT

## 2023-09-12 PROCEDURE — 84156 ASSAY OF PROTEIN URINE: CPT

## 2023-09-12 PROCEDURE — 84100 ASSAY OF PHOSPHORUS: CPT

## 2023-09-12 PROCEDURE — 83735 ASSAY OF MAGNESIUM: CPT

## 2023-09-12 PROCEDURE — 36415 COLL VENOUS BLD VENIPUNCTURE: CPT

## 2023-09-12 PROCEDURE — 85018 HEMOGLOBIN: CPT

## 2023-09-12 PROCEDURE — 85025 COMPLETE CBC W/AUTO DIFF WBC: CPT

## 2023-09-12 PROCEDURE — 83970 ASSAY OF PARATHORMONE: CPT

## 2023-09-12 PROCEDURE — 82728 ASSAY OF FERRITIN: CPT

## 2023-09-12 PROCEDURE — 82306 VITAMIN D 25 HYDROXY: CPT

## 2023-09-19 ENCOUNTER — ANESTHESIA EVENT (OUTPATIENT)
Dept: SURGERY | Facility: HOSPITAL | Age: 88
End: 2023-09-19
Payer: MEDICARE

## 2023-09-19 ENCOUNTER — NURSE ONLY (OUTPATIENT)
Dept: NEPHROLOGY | Facility: CLINIC | Age: 88
End: 2023-09-19
Payer: MEDICARE

## 2023-09-19 VITALS — SYSTOLIC BLOOD PRESSURE: 138 MMHG | BODY MASS INDEX: 28 KG/M2 | WEIGHT: 147.38 LBS | DIASTOLIC BLOOD PRESSURE: 68 MMHG

## 2023-09-19 DIAGNOSIS — N18.4 STAGE 4 CHRONIC KIDNEY DISEASE (HCC): Primary | ICD-10-CM

## 2023-09-19 DIAGNOSIS — D63.1 ANEMIA IN STAGE 4 CHRONIC KIDNEY DISEASE: ICD-10-CM

## 2023-09-19 DIAGNOSIS — N18.4 ANEMIA IN STAGE 4 CHRONIC KIDNEY DISEASE: ICD-10-CM

## 2023-09-19 PROCEDURE — 96372 THER/PROPH/DIAG INJ SC/IM: CPT | Performed by: INTERNAL MEDICINE

## 2023-09-19 PROCEDURE — 3078F DIAST BP <80 MM HG: CPT | Performed by: INTERNAL MEDICINE

## 2023-09-19 PROCEDURE — 3075F SYST BP GE 130 - 139MM HG: CPT | Performed by: INTERNAL MEDICINE

## 2023-09-20 ENCOUNTER — HOSPITAL ENCOUNTER (OUTPATIENT)
Facility: HOSPITAL | Age: 88
Setting detail: HOSPITAL OUTPATIENT SURGERY
Discharge: HOME | End: 2023-09-20
Attending: UROLOGY | Admitting: UROLOGY
Payer: MEDICARE

## 2023-09-20 ENCOUNTER — ANESTHESIA (OUTPATIENT)
Dept: SURGERY | Facility: HOSPITAL | Age: 88
End: 2023-09-20
Payer: MEDICARE

## 2023-09-20 VITALS
HEART RATE: 68 BPM | SYSTOLIC BLOOD PRESSURE: 133 MMHG | RESPIRATION RATE: 18 BRPM | TEMPERATURE: 98 F | HEIGHT: 61 IN | OXYGEN SATURATION: 94 % | BODY MASS INDEX: 27.58 KG/M2 | WEIGHT: 146.06 LBS | DIASTOLIC BLOOD PRESSURE: 72 MMHG

## 2023-09-20 LAB
GLUCOSE BLD-MCNC: 107 MG/DL (ref 70–99)
GLUCOSE BLD-MCNC: 115 MG/DL (ref 70–99)

## 2023-09-20 PROCEDURE — 0VB70ZZ EXCISION OF LEFT TUNICA VAGINALIS, OPEN APPROACH: ICD-10-PCS | Performed by: UROLOGY

## 2023-09-20 PROCEDURE — 82962 GLUCOSE BLOOD TEST: CPT

## 2023-09-20 PROCEDURE — 88307 TISSUE EXAM BY PATHOLOGIST: CPT | Performed by: UROLOGY

## 2023-09-20 PROCEDURE — 88342 IMHCHEM/IMCYTCHM 1ST ANTB: CPT | Performed by: UROLOGY

## 2023-09-20 PROCEDURE — 88341 IMHCHEM/IMCYTCHM EA ADD ANTB: CPT | Performed by: UROLOGY

## 2023-09-20 RX ORDER — ONDANSETRON 2 MG/ML
4 INJECTION INTRAMUSCULAR; INTRAVENOUS EVERY 6 HOURS PRN
Status: DISCONTINUED | OUTPATIENT
Start: 2023-09-20 | End: 2023-09-20

## 2023-09-20 RX ORDER — DEXTROSE MONOHYDRATE 25 G/50ML
50 INJECTION, SOLUTION INTRAVENOUS
Status: DISCONTINUED | OUTPATIENT
Start: 2023-09-20 | End: 2023-09-20 | Stop reason: HOSPADM

## 2023-09-20 RX ORDER — SODIUM CHLORIDE, SODIUM LACTATE, POTASSIUM CHLORIDE, CALCIUM CHLORIDE 600; 310; 30; 20 MG/100ML; MG/100ML; MG/100ML; MG/100ML
INJECTION, SOLUTION INTRAVENOUS CONTINUOUS
Status: DISCONTINUED | OUTPATIENT
Start: 2023-09-20 | End: 2023-09-20

## 2023-09-20 RX ORDER — NALOXONE HYDROCHLORIDE 0.4 MG/ML
0.08 INJECTION, SOLUTION INTRAMUSCULAR; INTRAVENOUS; SUBCUTANEOUS AS NEEDED
Status: DISCONTINUED | OUTPATIENT
Start: 2023-09-20 | End: 2023-09-20

## 2023-09-20 RX ORDER — CEFAZOLIN SODIUM/WATER 2 G/20 ML
2 SYRINGE (ML) INTRAVENOUS ONCE
Status: COMPLETED | OUTPATIENT
Start: 2023-09-20 | End: 2023-09-20

## 2023-09-20 RX ORDER — NICOTINE POLACRILEX 4 MG
15 LOZENGE BUCCAL
Status: DISCONTINUED | OUTPATIENT
Start: 2023-09-20 | End: 2023-09-20 | Stop reason: HOSPADM

## 2023-09-20 RX ORDER — ONDANSETRON 2 MG/ML
INJECTION INTRAMUSCULAR; INTRAVENOUS AS NEEDED
Status: DISCONTINUED | OUTPATIENT
Start: 2023-09-20 | End: 2023-09-20

## 2023-09-20 RX ORDER — NICOTINE POLACRILEX 4 MG
30 LOZENGE BUCCAL
Status: DISCONTINUED | OUTPATIENT
Start: 2023-09-20 | End: 2023-09-20 | Stop reason: HOSPADM

## 2023-09-20 RX ORDER — ACETAMINOPHEN 500 MG
1000 TABLET ORAL ONCE
Status: DISCONTINUED | OUTPATIENT
Start: 2023-09-20 | End: 2023-09-20 | Stop reason: HOSPADM

## 2023-09-20 RX ORDER — BUPIVACAINE HYDROCHLORIDE 5 MG/ML
INJECTION, SOLUTION EPIDURAL; INTRACAUDAL AS NEEDED
Status: DISCONTINUED | OUTPATIENT
Start: 2023-09-20 | End: 2023-09-20 | Stop reason: HOSPADM

## 2023-09-20 RX ORDER — ACETAMINOPHEN 325 MG/1
650 TABLET ORAL ONCE
Status: DISCONTINUED | OUTPATIENT
Start: 2023-09-20 | End: 2023-09-20

## 2023-09-20 RX ORDER — HYDROCODONE BITARTRATE AND ACETAMINOPHEN 5; 325 MG/1; MG/1
1 TABLET ORAL ONCE AS NEEDED
Status: COMPLETED | OUTPATIENT
Start: 2023-09-20 | End: 2023-09-20

## 2023-09-20 RX ORDER — HYDROCODONE BITARTRATE AND ACETAMINOPHEN 5; 325 MG/1; MG/1
2 TABLET ORAL ONCE AS NEEDED
Status: COMPLETED | OUTPATIENT
Start: 2023-09-20 | End: 2023-09-20

## 2023-09-20 RX ORDER — LIDOCAINE HYDROCHLORIDE 10 MG/ML
INJECTION, SOLUTION EPIDURAL; INFILTRATION; INTRACAUDAL; PERINEURAL AS NEEDED
Status: DISCONTINUED | OUTPATIENT
Start: 2023-09-20 | End: 2023-09-20 | Stop reason: SURG

## 2023-09-20 RX ORDER — ACETAMINOPHEN 500 MG
1000 TABLET ORAL ONCE AS NEEDED
Status: COMPLETED | OUTPATIENT
Start: 2023-09-20 | End: 2023-09-20

## 2023-09-20 RX ADMIN — ONDANSETRON 4 MG: 2 INJECTION INTRAMUSCULAR; INTRAVENOUS at 08:30:00

## 2023-09-20 RX ADMIN — LIDOCAINE HYDROCHLORIDE 50 MG: 10 INJECTION, SOLUTION EPIDURAL; INFILTRATION; INTRACAUDAL; PERINEURAL at 07:40:00

## 2023-09-20 RX ADMIN — CEFAZOLIN SODIUM/WATER 2 G: 2 G/20 ML SYRINGE (ML) INTRAVENOUS at 07:48:00

## 2023-09-20 RX ADMIN — SODIUM CHLORIDE, SODIUM LACTATE, POTASSIUM CHLORIDE, CALCIUM CHLORIDE: 600; 310; 30; 20 INJECTION, SOLUTION INTRAVENOUS at 07:40:00

## 2023-09-20 NOTE — OPERATIVE REPORT
Operative Note  R Dimas Lacey Patient Status:  [facility] Surgery    1935 MRN [id number]   Location [address] [facility] Attending Pat Armijo MD   Hosp Day # 0 PCP Nasir Michaud MD     Date of Operation: 2023    Surgeon: Jagdish Pathak M.D. Procedure: Left  Hydrocelectomy, Testis Biopsy    Pre-Op Diagnosis:   Scrotal Hydrocele    Post-Op Diagnosis: Scrotal Hydrocele    Surgeon: Jagdish Pathak M.D. Anesthesia:  General    Findings: 250ml hydrocele,, a few 6 or 8 mm smooth slightly erythematous velvety nodules of the visceral layer of the tunica. Looked benign. 1 of these was biopsied. Hydrocele sac was supple and not thickened. The testicle itself seemed normal.    Specimens: Biopsy of testicular tunic. Blood Loss:  2 ml    Complications:  None    Drains:  None    Technique:                            A general anesthesia was induced. Preoperative antibiotics were administered. The patient was prepped and draped in the supine position. Sequential compression devices were in place on the lower legs. The left hemiscrotum was enlarged but supple. There was a 12 x 6 cm hydrocele. Several 3 to 8 mm scrotal sebaceous cysts were also noted. Half percent Marcaine was used to infiltrate the median raphae of the scrotum and also the cord, just above the testicle. A 5 cm incision was made in the median raphe of the scrotum. The left hemiscrotum was quite large, well over 10 cm in diameter. Dissection was carried down to the hydrocele sac. The hydrocele sac looked normal, supple. The hydrocele was drained. Over 200 mL of clear straw colored fluid were drained. The excess hydrocele sac was teased away from the dartos muscle, partially excised, and then the remainder of the sac was sutured to itself posterior to the testicle using a running locking suture of 4-0 chromic.   There were several 6 to 8 mm slightly erythematous nodules of the visceral layer of the tunic. The underlying testis tissue however was very supple and there were no testicular masses. I excised 1 of these lesions of the tunic and sent it as a specimen, testis biopsy. This area was then fulgurated. Good hemostasis was achieved. The testicle was placed back into the scrotum and fixed to the dartos muscle with a single suture of 4-0 Vicryl near the mid pole of the testicle anteriorly. The dartos muscle was closed using 4-0 chromic and then the skin was closed using a subcuticular suture of 4-0 Vicryl. The electrocautery had been used as needed for hemostasis. Gauze was positioned over the incision. A scrotal supporter was placed.  The patient was awakened and taken to the recovery room in good condition    Salinas Alaniz MD  9/20/2023

## 2023-09-20 NOTE — ANESTHESIA PROCEDURE NOTES
Airway  Date/Time: 9/20/2023 7:47 AM  Urgency: elective    Airway not difficult    General Information and Staff    Patient location during procedure: OR  Anesthesiologist: Davide De Leon MD  Performed: anesthesiologist   Performed by: Davide De Leon MD  Authorized by: Davide De Leon MD      Indications and Patient Condition  Indications for airway management: anesthesia  Spontaneous ventilation: present  Sedation level: deep  Preoxygenated: yes  Patient position: sniffing  Mask difficulty assessment: 1 - vent by mask  Planned trial extubation    Final Airway Details  Final airway type: supraglottic airway      Successful airway: classic  Size 3       Number of attempts at approach: 1  Ventilation between attempts: none  Number of other approaches attempted: 0

## 2023-09-20 NOTE — DISCHARGE INSTRUCTIONS
Home Care Instructions Following Your Hydrocelectomy     Serafim-  We hope you were pleased with your care at [facility].  We wish you the best outcome and overall experience with your operation. These instructions will help to minimize pain, limit the risk of infection, and improve the likelihood of a successful result. Bandages (Dressing)  Change your bandage each day or as needed if blood stained or soiled. Bathing/Showers  You can resume showering tomorrow. No baths, swimming, or hot tubs for 1 week    Over-The-Counter Medication  Non-prescription anti-inflammatory medications can also help to ease the pain. You can take Aleve or ibuprofen   Take as directed on the bottle  Drink a full glass of water with the medication    Cold Therapy  Cold therapy will help minimize swelling, improve, comfort, and limit the need for pain medication. Apply a bag of ice wrapped in a towel for 20 minutes three times daily  An ice bag should never come in direct contact with the skin. Immediately contact Dr. Jason Tran, if you experience excruciating pain not helped by pain medication, burning, blisters, redness, discoloration, or other changes in skin  appearance    Athletic Support  Your athletic supporter protects your operative site and will help to minimize pain. Keep your athletic supporter or supportive underwear on at all times for one week  Wear athletic supporter when sleeping and ambulating    Home Medication  Resume your home medications    Diet  Resume your normal diet    Activity  No strenuous activity or long walks for 1 week. You can go up and down the stairs as tolerated. Use common sense  Refrain from physical exercise, sports, and gym workouts for two weeks. Driving  Do not drive, if you aretaking pain medication.       Follow-up Appointment with Dr. Jason Tran  As scheduled in 1 month    Questions or Concerns  Call Dr. Zak Banuelos office if you experience severe pain not controlled by pain medication, swelling, numbness, tingling, bleeding, fever, or other concerns. If your call is made after office hours, a physician's assistant or nurse practitioner will be available to help you. There is always a surgeon covering Dr. Cummings Marrow patients, if he is unavailable. Heavenly  Thank you for coming to [facility] for your operation. The nurses and the anesthesiologist try very hard to make sure you receive the best care possible. Your trust in them is greatly appreciated.     Thanks so much,  Dr. Nury Almanza

## 2023-09-22 ENCOUNTER — APPOINTMENT (OUTPATIENT)
Dept: INFUSION THERAPY | Age: 88
End: 2023-09-22
Attending: INTERNAL MEDICINE

## 2023-10-16 ENCOUNTER — LAB ENCOUNTER (OUTPATIENT)
Dept: LAB | Age: 88
End: 2023-10-16
Attending: INTERNAL MEDICINE
Payer: MEDICARE

## 2023-10-16 ENCOUNTER — TELEPHONE (OUTPATIENT)
Dept: NEPHROLOGY | Facility: CLINIC | Age: 88
End: 2023-10-16

## 2023-10-16 DIAGNOSIS — N18.4 STAGE 4 CHRONIC KIDNEY DISEASE (HCC): ICD-10-CM

## 2023-10-16 LAB — HGB BLD-MCNC: 9.8 G/DL

## 2023-10-16 PROCEDURE — 36415 COLL VENOUS BLD VENIPUNCTURE: CPT

## 2023-10-16 PROCEDURE — 85018 HEMOGLOBIN: CPT

## 2023-10-18 ENCOUNTER — NURSE ONLY (OUTPATIENT)
Dept: NEPHROLOGY | Facility: CLINIC | Age: 88
End: 2023-10-18
Payer: MEDICARE

## 2023-10-18 VITALS — BODY MASS INDEX: 28 KG/M2 | WEIGHT: 146 LBS

## 2023-10-18 DIAGNOSIS — N18.4 CHRONIC KIDNEY DISEASE, STAGE IV (SEVERE) (HCC): Primary | ICD-10-CM

## 2023-10-18 DIAGNOSIS — D63.1 ANEMIA OF CHRONIC RENAL FAILURE, STAGE 4 (SEVERE): ICD-10-CM

## 2023-10-18 DIAGNOSIS — N18.4 ANEMIA OF CHRONIC RENAL FAILURE, STAGE 4 (SEVERE): ICD-10-CM

## 2023-10-18 PROCEDURE — 96372 THER/PROPH/DIAG INJ SC/IM: CPT | Performed by: INTERNAL MEDICINE

## 2023-11-07 ENCOUNTER — OFFICE VISIT (OUTPATIENT)
Dept: NEPHROLOGY | Facility: CLINIC | Age: 88
End: 2023-11-07
Payer: MEDICARE

## 2023-11-07 VITALS — WEIGHT: 145 LBS | BODY MASS INDEX: 27 KG/M2 | SYSTOLIC BLOOD PRESSURE: 122 MMHG | DIASTOLIC BLOOD PRESSURE: 64 MMHG

## 2023-11-07 DIAGNOSIS — N18.4 STAGE 4 CHRONIC KIDNEY DISEASE (HCC): Primary | ICD-10-CM

## 2023-11-07 PROCEDURE — 99213 OFFICE O/P EST LOW 20 MIN: CPT | Performed by: INTERNAL MEDICINE

## 2023-11-07 RX ORDER — DARBEPOETIN ALFA 300 UG/ML
300 SOLUTION INTRAVENOUS; SUBCUTANEOUS
COMMUNITY

## 2023-11-07 NOTE — PROGRESS NOTES
Nephrology Clinic Note      REASON:  CKD4         HPI:   Amalia Klein is a 82year old male with No chief complaint on file. Alia Taylor MD    81 y/o pleasant male with hx of HTN, CAD, DM, CKD - [facility] w/ [facility] who presents to establish care w/ our group  He is doing well  Denies any cp/sob  No n/v  NO edema  He has been following w/ nephrology   Has declined access planning- would prefer to get a HD cath when needs dialysis    Has thought about transplant- not interested now              ROS:  See above; 12 systems reviewed and otherwise unremarkable     PMH:  Past Medical History:   Diagnosis Date    Atherosclerosis of coronary artery     Diabetes ([facility] [address]     Essential hypertension     Hearing impairment     no hearing aids    Heart attack ([facility] [address]     High blood pressure     High cholesterol     History of angioplasty     History of quadruple bypass     Problems with swallowing     takes meds with applesauce    Renal disorder     ckd    Sleep apnea     no device or appliance         PSH:  Past Surgical History:   Procedure Laterality Date    CABG           Medications (Active prior to today's visit):  Current Outpatient Medications   Medication Sig Dispense Refill    acetaminophen 500 MG Oral Tab Take 2 tablets (1,000 mg total) by mouth 2 (two) times daily as needed for Pain or Fever. albuterol 108 (90 Base) MCG/ACT Inhalation Aero Soln Inhale 1 puff into the lungs as needed for Wheezing. amLODIPine 5 MG Oral Tab Take 1 tablet (5 mg total) by mouth daily. ascorbic acid 1000 MG Oral Tab Take 2 tablets (2,000 mg total) by mouth daily. atorvastatin 40 MG Oral Tab Take 1 tablet (40 mg total) by mouth daily. famotidine 40 MG Oral Tab Take 1 tablet (40 mg total) by mouth daily. ferrous sulfate 325 (65 FE) MG Oral Tab EC Take 1 tablet (325 mg total) by mouth daily. hydrALAZINE 100 MG Oral Tab Take 1 tablet (100 mg total) by mouth 3 (three) times daily. isosorbide mononitrate ER 30 MG Oral Tablet 24 Hr Take 1 tablet (30 mg total) by mouth daily. methIMAzole 5 MG Oral Tab Take 1 tablet (5 mg total) by mouth daily. metoprolol succinate ER 25 MG Oral Tablet 24 Hr Take 1 tablet (25 mg total) by mouth daily. Olopatadine HCl 0.7 % Ophthalmic Solution Place 1 drop into both ears daily as needed. epoetin winsome-epbx (RETACRIT) 04794 UNIT/ML Injection Solution Inject 1 mL (10,000 Units total) into the skin every 14 (fourteen) days. SITagliptin Phosphate 25 MG Oral Tab Take 1 tablet (25 mg total) by mouth daily. tamsulosin 0.4 MG Oral Cap Take 1 capsule (0.4 mg total) by mouth daily. traMADol 50 MG Oral Tab Take 1 tablet (50 mg total) by mouth every 8 (eight) hours as needed for Pain. traZODone 50 MG Oral Tab Take 1 tablet (50 mg total) by mouth nightly. Allergies:  No Known Allergies    Social History:  Social History     Socioeconomic History    Marital status: Unknown   Tobacco Use    Smoking status: Former    Smokeless tobacco: Never   Vaping Use    Vaping Use: Never used   Substance and Sexual Activity    Alcohol use: Yes     Comment: occasional    Drug use: Never          Family History:  No family history on file. PHYSICAL EXAM:   There were no vitals taken for this visit. Wt Readings from Last 6 Encounters:   10/18/23 146 lb (66.2 kg)   09/20/23 146 lb 0.9 oz (66.3 kg)   09/19/23 147 lb 6 oz (66.8 kg)   09/05/23 145 lb 4 oz (65.9 kg)   08/05/22 135 lb (61.2 kg)     General: Alert and oriented in no apparent distress. HEENT: No scleral icterus, MMM  Neck: Supple, no ANDRE or thyromegaly  Cardiac: Regular rate and rhythm, S1, S2 normal, no murmur or rub  Lungs: Clear without wheezes, rales, rhonchi. Abdomen: Soft, non-tender. + bowel sounds, no palpable organomegaly  Extremities: Without clubbing, cyanosis or edema.   Neurologic:  normal affect, cranial nerves grossly intact, moving all extremities  Skin: Warm and dry, no rashes    Labs:   8/25/23  Cr 3.23  Lytes nl  Hgb 9.7  Ca nl       ASSESSMENT/PLAN:   There are no diagnoses linked to this encounter. 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 2 PCKD- no family hx per patient   He has had prior discussions about dialysis  - as noted above  - no to transplant per patient   - no med changes  - volume looks good  - labs as noted above  - vit D/iron stores normal  - will plan pt to come in for Aranesp next week  - monitor labs q 2 months; hgb monthly  Anemia - as above   BMD - as above; on otc vit D 1000U daily  HTN- controlled; meds reviewed; cpm  DM- controlled; per primary  CAD  HL    F/u in 2 mos     Jimmy Xiao MD  11/7/2023

## 2023-11-14 ENCOUNTER — NURSE ONLY (OUTPATIENT)
Dept: NEPHROLOGY | Facility: CLINIC | Age: 88
End: 2023-11-14
Payer: MEDICARE

## 2023-11-14 VITALS — BODY MASS INDEX: 28 KG/M2 | WEIGHT: 146 LBS | DIASTOLIC BLOOD PRESSURE: 64 MMHG | SYSTOLIC BLOOD PRESSURE: 132 MMHG

## 2023-11-14 DIAGNOSIS — N18.4 ANEMIA OF CHRONIC RENAL FAILURE, STAGE 4 (SEVERE): Primary | ICD-10-CM

## 2023-11-14 DIAGNOSIS — N18.4 STAGE 4 CHRONIC KIDNEY DISEASE (HCC): ICD-10-CM

## 2023-11-14 DIAGNOSIS — D63.1 ANEMIA OF CHRONIC RENAL FAILURE, STAGE 4 (SEVERE): Primary | ICD-10-CM

## 2023-11-14 PROCEDURE — 3075F SYST BP GE 130 - 139MM HG: CPT | Performed by: INTERNAL MEDICINE

## 2023-11-14 PROCEDURE — 96372 THER/PROPH/DIAG INJ SC/IM: CPT | Performed by: INTERNAL MEDICINE

## 2023-11-14 PROCEDURE — 3078F DIAST BP <80 MM HG: CPT | Performed by: INTERNAL MEDICINE

## 2023-12-08 ENCOUNTER — LAB ENCOUNTER (OUTPATIENT)
Dept: LAB | Age: 88
End: 2023-12-08
Attending: INTERNAL MEDICINE
Payer: MEDICARE

## 2023-12-08 DIAGNOSIS — N18.4 STAGE 4 CHRONIC KIDNEY DISEASE (HCC): ICD-10-CM

## 2023-12-08 LAB
ANION GAP SERPL CALC-SCNC: 9 MMOL/L (ref 0–18)
BASOPHILS # BLD AUTO: 0.02 X10(3) UL (ref 0–0.2)
BASOPHILS NFR BLD AUTO: 0.3 %
BUN BLD-MCNC: 85 MG/DL (ref 9–23)
CALCIUM BLD-MCNC: 9.2 MG/DL (ref 8.5–10.1)
CHLORIDE SERPL-SCNC: 110 MMOL/L (ref 98–112)
CO2 SERPL-SCNC: 22 MMOL/L (ref 21–32)
CREAT BLD-MCNC: 3.64 MG/DL
EGFRCR SERPLBLD CKD-EPI 2021: 16 ML/MIN/1.73M2 (ref 60–?)
EOSINOPHIL # BLD AUTO: 0.04 X10(3) UL (ref 0–0.7)
EOSINOPHIL NFR BLD AUTO: 0.5 %
ERYTHROCYTE [DISTWIDTH] IN BLOOD BY AUTOMATED COUNT: 19.1 %
FASTING STATUS PATIENT QL REPORTED: NO
GLUCOSE BLD-MCNC: 85 MG/DL (ref 70–99)
HCT VFR BLD AUTO: 40.9 %
HGB BLD-MCNC: 12 G/DL
HGB BLD-MCNC: 12.2 G/DL
IMM GRANULOCYTES # BLD AUTO: 0.01 X10(3) UL (ref 0–1)
IMM GRANULOCYTES NFR BLD: 0.1 %
LYMPHOCYTES # BLD AUTO: 1.03 X10(3) UL (ref 1–4)
LYMPHOCYTES NFR BLD AUTO: 13.8 %
MAGNESIUM SERPL-MCNC: 2.3 MG/DL (ref 1.6–2.6)
MCH RBC QN AUTO: 20.8 PG (ref 26–34)
MCHC RBC AUTO-ENTMCNC: 29.3 G/DL (ref 31–37)
MCV RBC AUTO: 70.8 FL
MONOCYTES # BLD AUTO: 0.89 X10(3) UL (ref 0.1–1)
MONOCYTES NFR BLD AUTO: 11.9 %
NEUTROPHILS # BLD AUTO: 5.5 X10 (3) UL (ref 1.5–7.7)
NEUTROPHILS # BLD AUTO: 5.5 X10(3) UL (ref 1.5–7.7)
NEUTROPHILS NFR BLD AUTO: 73.4 %
OSMOLALITY SERPL CALC.SUM OF ELEC: 317 MOSM/KG (ref 275–295)
PHOSPHATE SERPL-MCNC: 4.2 MG/DL (ref 2.5–4.9)
PLATELET # BLD AUTO: 177 10(3)UL (ref 150–450)
POTASSIUM SERPL-SCNC: 4.6 MMOL/L (ref 3.5–5.1)
PTH-INTACT SERPL-MCNC: 72.2 PG/ML (ref 18.5–88)
RBC # BLD AUTO: 5.78 X10(6)UL
SODIUM SERPL-SCNC: 141 MMOL/L (ref 136–145)
VIT D+METAB SERPL-MCNC: 30.6 NG/ML (ref 30–100)
WBC # BLD AUTO: 7.5 X10(3) UL (ref 4–11)

## 2023-12-08 PROCEDURE — 36415 COLL VENOUS BLD VENIPUNCTURE: CPT

## 2023-12-08 PROCEDURE — 85025 COMPLETE CBC W/AUTO DIFF WBC: CPT

## 2023-12-08 PROCEDURE — 83970 ASSAY OF PARATHORMONE: CPT

## 2023-12-08 PROCEDURE — 80048 BASIC METABOLIC PNL TOTAL CA: CPT

## 2023-12-08 PROCEDURE — 85018 HEMOGLOBIN: CPT

## 2023-12-08 PROCEDURE — 83735 ASSAY OF MAGNESIUM: CPT

## 2023-12-08 PROCEDURE — 84100 ASSAY OF PHOSPHORUS: CPT

## 2023-12-08 PROCEDURE — 82306 VITAMIN D 25 HYDROXY: CPT

## 2024-01-05 ENCOUNTER — LAB ENCOUNTER (OUTPATIENT)
Dept: LAB | Age: 89
End: 2024-01-05
Attending: INTERNAL MEDICINE
Payer: MEDICARE

## 2024-01-05 DIAGNOSIS — N18.4 STAGE 4 CHRONIC KIDNEY DISEASE (HCC): ICD-10-CM

## 2024-01-05 LAB — HGB BLD-MCNC: 10.4 G/DL

## 2024-01-05 PROCEDURE — 85018 HEMOGLOBIN: CPT

## 2024-01-05 PROCEDURE — 36415 COLL VENOUS BLD VENIPUNCTURE: CPT

## 2024-01-09 ENCOUNTER — OFFICE VISIT (OUTPATIENT)
Dept: NEPHROLOGY | Facility: CLINIC | Age: 89
End: 2024-01-09
Payer: MEDICARE

## 2024-01-09 VITALS — DIASTOLIC BLOOD PRESSURE: 74 MMHG | SYSTOLIC BLOOD PRESSURE: 144 MMHG | WEIGHT: 149.63 LBS | BODY MASS INDEX: 28 KG/M2

## 2024-01-09 DIAGNOSIS — N18.4 STAGE 4 CHRONIC KIDNEY DISEASE (HCC): Primary | ICD-10-CM

## 2024-01-09 PROCEDURE — 99213 OFFICE O/P EST LOW 20 MIN: CPT | Performed by: INTERNAL MEDICINE

## 2024-01-09 NOTE — PROGRESS NOTES
Nephrology Clinic Note      REASON:  CKD4         HPI:   Heavenly Perez is a 88 year old male with   Chief Complaint   Patient presents with    Chronic Kidney Disease    Hypertension    Anemia     Brayden Reich MD    87 y/o pleasant male with hx of HTN, CAD, DM, CKD - previously w/ [facility] who presents to establish care w/ our group  He is doing well  Denies any cp/sob  No n/v  NO edema  He has been following w/ nephrology   Has declined access planning- would prefer to get a HD cath when needs dialysis    Has thought about transplant- not interested now              ROS:  See above; 12 systems reviewed and otherwise unremarkable     PMH:  Past Medical History:   Diagnosis Date    Atherosclerosis of coronary artery     Diabetes (HCC)     Essential hypertension     Hearing impairment     no hearing aids    Heart attack (HCC)     High blood pressure     High cholesterol     History of angioplasty     History of quadruple bypass     Problems with swallowing     takes meds with applesauce    Renal disorder     ckd    Sleep apnea     no device or appliance         PSH:  Past Surgical History:   Procedure Laterality Date    CABG           Medications (Active prior to today's visit):  Current Outpatient Medications   Medication Sig Dispense Refill    Darbepoetin Orlin (ARANESP, ALBUMIN FREE,) 300 MCG/ML Injection Solution Inject 300 mcg as directed every 30 (thirty) days.      acetaminophen 500 MG Oral Tab Take 2 tablets (1,000 mg total) by mouth 2 (two) times daily as needed for Pain or Fever.      albuterol 108 (90 Base) MCG/ACT Inhalation Aero Soln Inhale 1 puff into the lungs as needed for Wheezing.      amLODIPine 5 MG Oral Tab Take 1 tablet (5 mg total) by mouth daily.      ascorbic acid 1000 MG Oral Tab Take 2 tablets (2,000 mg total) by mouth daily.      atorvastatin 40 MG Oral Tab Take 1 tablet (40 mg total) by mouth daily.      famotidine 40 MG Oral Tab Take 1 tablet (40 mg total) by mouth  daily.      ferrous sulfate 325 (65 FE) MG Oral Tab EC Take 1 tablet (325 mg total) by mouth daily.      hydrALAZINE 100 MG Oral Tab Take 1 tablet (100 mg total) by mouth 3 (three) times daily.      isosorbide mononitrate ER 30 MG Oral Tablet 24 Hr Take 1 tablet (30 mg total) by mouth daily.      methIMAzole 5 MG Oral Tab Take 1 tablet (5 mg total) by mouth daily.      metoprolol succinate ER 25 MG Oral Tablet 24 Hr Take 1 tablet (25 mg total) by mouth daily.      Olopatadine HCl 0.7 % Ophthalmic Solution Place 1 drop into both ears daily as needed.      SITagliptin Phosphate 25 MG Oral Tab Take 1 tablet (25 mg total) by mouth daily.      tamsulosin 0.4 MG Oral Cap Take 1 capsule (0.4 mg total) by mouth daily.      traMADol 50 MG Oral Tab Take 1 tablet (50 mg total) by mouth every 8 (eight) hours as needed for Pain.      traZODone 50 MG Oral Tab Take 1 tablet (50 mg total) by mouth nightly.           Allergies:  No Known Allergies    Social History:  Social History     Socioeconomic History    Marital status: Unknown   Tobacco Use    Smoking status: Former    Smokeless tobacco: Never   Vaping Use    Vaping Use: Never used   Substance and Sexual Activity    Alcohol use: Yes     Comment: occasional    Drug use: Never          Family History:  History reviewed. No pertinent family history.         PHYSICAL EXAM:   /[birth year]   Wt 149 lb 9.6 oz (67.9 kg)   BMI 28.27 kg/m²    Wt Readings from Last 6 Encounters:   01/09/24 149 lb 9.6 oz (67.9 kg)   11/14/23 146 lb (66.2 kg)   11/07/23 145 lb (65.8 kg)   10/18/23 146 lb (66.2 kg)   09/20/23 146 lb 0.9 oz (66.3 kg)   09/19/23 147 lb 6 oz (66.8 kg)     General: Alert and oriented in no apparent distress.  HEENT: No scleral icterus, MMM  Neck: Supple, no ANDRE or thyromegaly  Cardiac: Regular rate and rhythm,  Lungs: Clear  Abdomen: Soft, non-tender.  Extremities: Without clubbing, cyanosis or edema.  Neurologic:  normal affect, cranial nerves grossly intact, moving all  extremities  Skin: Warm and dry, no rashes    Component      Latest Ref Rng 10/16/2023 12/8/2023 1/5/2024   WBC      4.0 - 11.0 x10(3) uL  7.5     RBC      3.80 - 5.80 x10(6)uL  5.78     Hemoglobin      13.0 - 17.5 g/dL 9.8 (L)  12.0 (L)  10.4 (L)    Hemoglobin        12.2 (L)     Hematocrit      39.0 - 53.0 %  40.9     Platelet Count      150.0 - 450.0 10(3)uL  177.0     MCV      80.0 - 100.0 fL  70.8 (L)     MCH      26.0 - 34.0 pg  20.8 (L)     MCHC      31.0 - 37.0 g/dL  29.3 (L)     RDW      %  19.1     Prelim Neutrophil Abs      1.50 - 7.70 x10 (3) uL  5.50     Neutrophils Absolute      1.50 - 7.70 x10(3) uL  5.50     Lymphocytes Absolute      1.00 - 4.00 x10(3) uL  1.03     Monocytes Absolute      0.10 - 1.00 x10(3) uL  0.89     Eosinophils Absolute      0.00 - 0.70 x10(3) uL  0.04     Basophils Absolute      0.00 - 0.20 x10(3) uL  0.02     Immature Granulocyte Absolute      0.00 - 1.00 x10(3) uL  0.01     Neutrophils %      %  73.4     Lymphocytes %      %  13.8     Monocytes %      %  11.9     Eosinophils %      %  0.5     Basophils %      %  0.3     Immature Granulocyte %      %  0.1     Glucose      70 - 99 mg/dL  85     Sodium      136 - 145 mmol/L  141     Potassium      3.5 - 5.1 mmol/L  4.6     Chloride      98 - 112 mmol/L  110     Carbon Dioxide, Total      21.0 - 32.0 mmol/L  22.0     ANION GAP      0 - 18 mmol/L  9     BUN      9 - 23 mg/dL  85 (H)     CREATININE      0.70 - 1.30 mg/dL  3.64 (H)     CALCIUM      8.5 - 10.1 mg/dL  9.2     CALCULATED OSMOLALITY      275 - 295 mOsm/kg  317 (H)     EGFR      >=60 mL/min/1.73m2  16 (L)     Patient Fasting for BMP?  No     PHOSPHORUS      2.5 - 4.9 mg/dL  4.2     Magnesium, Serum      1.6 - 2.6 mg/dL  2.3     PTH INTACT      18.5 - 88.0 pg/mL  72.2     VITAMIN D, 25-OH, TOTAL      30.0 - 100.0 ng/mL  30.6        Legend:  (L) Low  (H) High       ASSESSMENT/PLAN:   Heavenly was seen today for chronic kidney disease, hypertension and anemia.    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 2 PCKD- no family hx per patient   He has had prior discussions about dialysis  - as noted above  - no to transplant per patient   - no med changes  - volume looks good  - labs as noted above  - vit D normal  - monitor labs q 2 months; hgb monthly  - not a candidate for SGLT2i/Kerendia  Anemia - @ goal; will monitor for CANDE (goal 10-11)- will refer to Dr. Romero - pt states his bill for the darbopoetin is very high - is interested in retacrit (was getting this previously and was less expensive- I told him we do not use it in our office). Will ask office to see if we can ammend it lower- pt states he was paying about 20 dollars before, now its up to 180  BMD - as above; on otc vit D 1000U daily; vit D/PTH OK  HTN- controlled; meds reviewed; cpm  DM-per primary   CAD  HL    F/u in 2 mos     Evens Rivers MD  1/9/2024

## 2024-02-08 ENCOUNTER — OFFICE VISIT (OUTPATIENT)
Dept: HEMATOLOGY/ONCOLOGY | Age: 89
End: 2024-02-08
Attending: INTERNAL MEDICINE
Payer: MEDICARE

## 2024-02-08 VITALS
TEMPERATURE: 97 F | DIASTOLIC BLOOD PRESSURE: 74 MMHG | HEART RATE: 69 BPM | BODY MASS INDEX: 27.38 KG/M2 | SYSTOLIC BLOOD PRESSURE: 134 MMHG | HEIGHT: 61 IN | OXYGEN SATURATION: 95 % | RESPIRATION RATE: 16 BRPM | WEIGHT: 145 LBS

## 2024-02-08 DIAGNOSIS — D64.9 ANEMIA, UNSPECIFIED TYPE: Primary | ICD-10-CM

## 2024-02-08 PROBLEM — N18.4: Status: ACTIVE | Noted: 2024-02-08

## 2024-02-08 PROBLEM — N18.4 ANEMIA ASSOCIATED WITH STAGE 4 CHRONIC RENAL FAILURE (HCC): Status: ACTIVE | Noted: 2024-02-08

## 2024-02-08 PROBLEM — D63.1 ANEMIA ASSOCIATED WITH STAGE 4 CHRONIC RENAL FAILURE (HCC): Status: ACTIVE | Noted: 2024-02-08

## 2024-02-08 PROBLEM — D63.1: Status: ACTIVE | Noted: 2024-02-08

## 2024-02-08 LAB
ALBUMIN SERPL-MCNC: 3.9 G/DL (ref 3.4–5)
ALBUMIN/GLOB SERPL: 1 {RATIO} (ref 1–2)
ALP LIVER SERPL-CCNC: 47 U/L
ALT SERPL-CCNC: 20 U/L
ANION GAP SERPL CALC-SCNC: 7 MMOL/L (ref 0–18)
AST SERPL-CCNC: 12 U/L (ref 15–37)
BASOPHILS # BLD AUTO: 0.02 X10(3) UL (ref 0–0.2)
BASOPHILS NFR BLD AUTO: 0.3 %
BILIRUB SERPL-MCNC: 0.5 MG/DL (ref 0.1–2)
BUN BLD-MCNC: 85 MG/DL (ref 9–23)
CALCIUM BLD-MCNC: 9.9 MG/DL (ref 8.5–10.1)
CHLORIDE SERPL-SCNC: 106 MMOL/L (ref 98–112)
CO2 SERPL-SCNC: 24 MMOL/L (ref 21–32)
CREAT BLD-MCNC: 3.98 MG/DL
DEPRECATED HBV CORE AB SER IA-ACNC: 674.5 NG/ML
EGFRCR SERPLBLD CKD-EPI 2021: 14 ML/MIN/1.73M2 (ref 60–?)
EOSINOPHIL # BLD AUTO: 0.07 X10(3) UL (ref 0–0.7)
EOSINOPHIL NFR BLD AUTO: 0.9 %
ERYTHROCYTE [DISTWIDTH] IN BLOOD BY AUTOMATED COUNT: 17.1 %
FOLATE SERPL-MCNC: 13.6 NG/ML (ref 8.7–?)
GLOBULIN PLAS-MCNC: 3.8 G/DL (ref 2.8–4.4)
GLUCOSE BLD-MCNC: 122 MG/DL (ref 70–99)
HCT VFR BLD AUTO: 30.8 %
HGB BLD-MCNC: 9.5 G/DL
HGB RETIC QN AUTO: 21.5 PG (ref 28.2–36.6)
IMM GRANULOCYTES # BLD AUTO: 0.04 X10(3) UL (ref 0–1)
IMM GRANULOCYTES NFR BLD: 0.5 %
IMM RETICS NFR: 0.15 RATIO (ref 0.1–0.3)
IRON SATN MFR SERPL: 36 %
IRON SERPL-MCNC: 102 UG/DL
LYMPHOCYTES # BLD AUTO: 1.19 X10(3) UL (ref 1–4)
LYMPHOCYTES NFR BLD AUTO: 15.1 %
MCH RBC QN AUTO: 20 PG (ref 26–34)
MCHC RBC AUTO-ENTMCNC: 30.8 G/DL (ref 31–37)
MCV RBC AUTO: 65 FL
MONOCYTES # BLD AUTO: 0.86 X10(3) UL (ref 0.1–1)
MONOCYTES NFR BLD AUTO: 10.9 %
NEUTROPHILS # BLD AUTO: 5.69 X10 (3) UL (ref 1.5–7.7)
NEUTROPHILS # BLD AUTO: 5.69 X10(3) UL (ref 1.5–7.7)
NEUTROPHILS NFR BLD AUTO: 72.3 %
OSMOLALITY SERPL CALC.SUM OF ELEC: 311 MOSM/KG (ref 275–295)
PLATELET # BLD AUTO: 205 10(3)UL (ref 150–450)
POTASSIUM SERPL-SCNC: 4.1 MMOL/L (ref 3.5–5.1)
PROT SERPL-MCNC: 7.7 G/DL (ref 6.4–8.2)
RBC # BLD AUTO: 4.74 X10(6)UL
RETICS # AUTO: 43.1 X10(3) UL (ref 22.5–147.5)
RETICS/RBC NFR AUTO: 0.9 %
SODIUM SERPL-SCNC: 137 MMOL/L (ref 136–145)
TIBC SERPL-MCNC: 280 UG/DL (ref 240–450)
TRANSFERRIN SERPL-MCNC: 188 MG/DL (ref 200–360)
VIT B12 SERPL-MCNC: 1839 PG/ML (ref 193–986)
WBC # BLD AUTO: 7.9 X10(3) UL (ref 4–11)

## 2024-02-08 PROCEDURE — 99205 OFFICE O/P NEW HI 60 MIN: CPT | Performed by: INTERNAL MEDICINE

## 2024-02-08 NOTE — CONSULTS
[facility] Report of Consultation    Patient Name: Heavenly Perez   YOB: 1935   Medical Record Number: [id number]   CSN: [id number]   Consulting Physician: Ananda Romero M.D.   Referring Physician: No ref. provider found    Date of Consultation: 2/8/2024     Reason for Consultation (Chief Complaint):  Chief Complaint   Patient presents with    Consult        History of Present Illness:  Heavenly Perez is a 88 year old male referred by Dr. Rivers for evaluation of anemia.  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 He was getting it at [facility] before moving his nephrology care to [place]. Starting at [facility], he was transitioned to Aranesp 300mcg monthly. He reports that he has tolerated it well, but he feels that his count has risen too high, and the Aranesp is far more expensive than the CANDE he was getting at [facility] (Retacrit).  He is referred to see if he can get his dosing here, with retacrit.   He reports that he feels well.     Past Medical History:  Past Medical History:   Diagnosis Date    Atherosclerosis of coronary artery     Diabetes (HCC)     Essential hypertension     Hearing impairment     no hearing aids    Heart attack (HCC)     High blood pressure     High cholesterol     History of angioplasty     History of quadruple bypass     Problems with swallowing     takes meds with applesauce    Renal disorder     ckd    Sleep apnea     no device or appliance       Past Surgical History:  Past Surgical History:   Procedure Laterality Date    CABG         Gynecologic History:      Family History:  No family history on file.    Social History:  Social History     Socioeconomic History    Marital status: Unknown     Spouse name: Not on file    Number of children: Not on file    Years of education: Not on file    Highest education level: Not on file   Occupational History    Not on file   Tobacco Use     Smoking status: Former    Smokeless tobacco: Never   Vaping Use    Vaping Use: Never used   Substance and Sexual Activity    Alcohol use: Yes     Comment: occasional    Drug use: Never    Sexual activity: Not on file   Other Topics Concern    Not on file   Social History Narrative    Not on file     Social Determinants of Health     Financial Resource Strain: Not on file   Food Insecurity: Not on file   Transportation Needs: Not on file   Physical Activity: Not on file   Stress: Not on file   Social Connections: Not on file   Housing Stability: Not on file       Current Medications:    Current Outpatient Medications:     Darbepoetin Orlin (ARANESP, ALBUMIN FREE,) 300 MCG/ML Injection Solution, Inject 300 mcg as directed every 30 (thirty) days., Disp: , Rfl:     acetaminophen 500 MG Oral Tab, Take 2 tablets (1,000 mg total) by mouth 2 (two) times daily as needed for Pain or Fever., Disp: , Rfl:     albuterol 108 (90 Base) MCG/ACT Inhalation Aero Soln, Inhale 1 puff into the lungs as needed for Wheezing., Disp: , Rfl:     amLODIPine 5 MG Oral Tab, Take 1 tablet (5 mg total) by mouth daily., Disp: , Rfl:     ascorbic acid 1000 MG Oral Tab, Take 2 tablets (2,000 mg total) by mouth daily., Disp: , Rfl:     atorvastatin 40 MG Oral Tab, Take 1 tablet (40 mg total) by mouth daily., Disp: , Rfl:     famotidine 40 MG Oral Tab, Take 1 tablet (40 mg total) by mouth daily., Disp: , Rfl:     ferrous sulfate 325 (65 FE) MG Oral Tab EC, Take 1 tablet (325 mg total) by mouth daily., Disp: , Rfl:     hydrALAZINE 100 MG Oral Tab, Take 1 tablet (100 mg total) by mouth 3 (three) times daily., Disp: , Rfl:     isosorbide mononitrate ER 30 MG Oral Tablet 24 Hr, Take 1 tablet (30 mg total) by mouth daily., Disp: , Rfl:     methIMAzole 5 MG Oral Tab, Take 1 tablet (5 mg total) by mouth daily., Disp: , Rfl:     metoprolol succinate ER 25 MG Oral Tablet 24 Hr, Take 1 tablet (25 mg total) by mouth daily., Disp: , Rfl:     Olopatadine HCl 0.7 %  Ophthalmic Solution, Place 1 drop into both ears daily as needed., Disp: , Rfl:     SITagliptin Phosphate 25 MG Oral Tab, Take 1 tablet (25 mg total) by mouth daily., Disp: , Rfl:     tamsulosin 0.4 MG Oral Cap, Take 1 capsule (0.4 mg total) by mouth daily., Disp: , Rfl:     traMADol 50 MG Oral Tab, Take 1 tablet (50 mg total) by mouth every 8 (eight) hours as needed for Pain., Disp: , Rfl:     traZODone 50 MG Oral Tab, Take 1 tablet (50 mg total) by mouth nightly., Disp: , Rfl:     Allergies:  No Known Allergies     Review of Systems:    Constitutional No fevers, chills, night sweats, excessive fatigue or weight loss.   Eyes No significant visual difficulties. No diplopia. No yellowing of the eyes.   Hematologic/Lymphatic No easy bruising or bleeding.  No any tender or palpable lymph nodes.   Respiratory No dyspnea, Pleuritic chest pain, cough or hemoptysis.   Cardiovascular No anginal chest pain, palpitations or orthopnea.   Gastrointestinal No nausea, vomiting, diarrhea, GI bleeding, or constipation.   Genitorurinary (M) No hematuria, dysuria, or incontinence. No abnormal bleeding.   Integumentary No rashes or yellowing of the skin   Neurologic No headache, blurred vision, and no areas of focal weakness. Normal gait.   Psychiatric No insomnia, depression, joseph or mood swings.         Vital Signs:  /74 (BP Location: Left arm, Patient Position: Sitting, Cuff Size: adult)   Pulse 69   Temp 97.1 °F (36.2 °C) (Tympanic)   Resp 16   Ht 1.549 m (5' 1\")   Wt 65.8 kg (145 lb)   SpO2 95%   BMI 27.40 kg/m²     Performance Status:  ECOG 0    Physical Examination:    Constitutional Normal - Alert, cooperative, oriented. Mood and affect appropriate. Appears close to chronological age. Well nourished. Well developed.   Eyes Normal - Conjunctivae and sclerae are clear and without icterus. Pupils are reactive and equal.   ENMT Normal - Sinuses are nontender.  No oral exudates, ulcers, masses, thrush or mucositis.  Oropharynx clear.  Tongue normal.   Neck Normal - Supple without masses or thyromegaly.   Hematologic/Lymphatic Normal - No petechiae or purpura.  No tender or palpable lymph nodes in the cervical, supraclavicular, axillary or inguinal area.   Respiratory Normal - Lungs are clear to auscultation without rhonchi or wheezing.   Cardiovascular Normal - Regular rate and rhythm of heart without murmurs, gallops or rubs.   Abdomen Normal - Non-tender, non-distended, no masses, ascites or hepatosplenomegaly. Good bowel sounds. No guarding or rebound tenderness. No pulsatile masses.   Extremities Normal - No visible deformities, no cyanosis, clubbing or edema. Pulses 4+ and equal bilaterally.   Integumentary Normal - No rashes, scars, or lesions suggestive of malignancy.   Neurologic Normal - No sensory or motor deficits, normal cerebellar function, normal gait, cranial nerves intact.   Psychiatric Normal - Alert and oriented times three. Coherent speech. Verbalizes understanding of our discussions today.       Laboratory:      Latest Reference Range & Units 12/08/23 10:58   Sodium 136 - 145 mmol/L 141   Potassium 3.5 - 5.1 mmol/L 4.6   Chloride 98 - 112 mmol/L 110   Carbon Dioxide, Total 21.0 - 32.0 mmol/L 22.0   BUN 9 - 23 mg/dL 85 (H)   CREATININE 0.70 - 1.30 mg/dL 3.64 (H)   CALCIUM 8.5 - 10.1 mg/dL 9.2   EGFR >=60 mL/min/1.73m2 16 (L)   ANION GAP 0 - 18 mmol/L 9   CALCULATED OSMOLALITY 275 - 295 mOsm/kg 317 (H)   Magnesium, Serum 1.6 - 2.6 mg/dL 2.3   PHOSPHORUS 2.5 - 4.9 mg/dL 4.2   (H): Data is abnormally high  (L): Data is abnormally low     Latest Reference Range & Units 12/08/23 10:58 01/05/24 11:35   WBC 4.0 - 11.0 x10(3) uL 7.5    Hemoglobin 13.0 - 17.5 g/dL 12.0 (L) 10.4 (L)   Hematocrit 39.0 - 53.0 % 40.9    Platelet Count 150.0 - 450.0 10(3)uL 177.0    RBC 3.80 - 5.80 x10(6)uL 5.78    MCH 26.0 - 34.0 pg 20.8 (L)    MCHC 31.0 - 37.0 g/dL 29.3 (L)    MCV 80.0 - 100.0 fL 70.8 (L)    RDW % 19.1    Prelim  Neutrophil Abs 1.50 - 7.70 x10 (3) uL 5.50    Neutrophils Absolute 1.50 - 7.70 x10(3) uL 5.50    Lymphocytes Absolute 1.00 - 4.00 x10(3) uL 1.03    Monocytes Absolute 0.10 - 1.00 x10(3) uL 0.89    Eosinophils Absolute 0.00 - 0.70 x10(3) uL 0.04    Basophils Absolute 0.00 - 0.20 x10(3) uL 0.02    Immature Granulocyte Absolute 0.00 - 1.00 x10(3) uL 0.01    Neutrophils % % 73.4    Lymphocytes % % 13.8    Monocytes % % 11.9    Eosinophils % % 0.5    Basophils % % 0.3    Immature Granulocyte % % 0.1    (L): Data is abnormally low         Radiology:    Pathology:    Impression and Plan:  Anemia of renal disease: The patient has carried this diagnosis for some time. He has tolerated CANDE therapy. Will order retacrit today to obtain insurance approval. Will check a CBC to determine whether it is indicated now, or if we should wait. Will plan monthly hgb and injection, but we may have to adjust the dosing or schedule with the new agent.     Will also repeat Iron, TIBC, and Ferritin, B12 and folate to ensure that his stores are replete.      Planned Follow Up:  Monthly labs with injection  3 months for MD, Labs and injection.      Pre-visit charting and record review: 15  Visit time: 45  Post-visit chartin  Total time on the day of service: 65      Risk Level: High    Electronically Signed by:    Ananda Romero M.D.  [facility]

## 2024-02-08 NOTE — PROGRESS NOTES
New consult for anemia.  He gets aranesp monthly.  He states the cost is too high.      Outpatient Oncology Care Plan  Problem list:  knowledge deficit    Problems related to:    disease/disease progression    Interventions:  provided general teaching    Expected outcomes:  understands plan of care    Progress towards outcome:  making progress    Education Record    Learner:  Patient and Family Member  Barriers / Limitations:  None  Method:  Brief focused  Outcome:  Shows understanding  Comments:

## 2024-02-15 ENCOUNTER — OFFICE VISIT (OUTPATIENT)
Dept: HEMATOLOGY/ONCOLOGY | Age: 89
End: 2024-02-15
Attending: INTERNAL MEDICINE
Payer: MEDICARE

## 2024-02-15 DIAGNOSIS — D63.1 ANEMIA ASSOCIATED WITH STAGE 4 CHRONIC RENAL FAILURE  (HCC): Primary | ICD-10-CM

## 2024-02-15 DIAGNOSIS — N18.4 ANEMIA ASSOCIATED WITH STAGE 4 CHRONIC RENAL FAILURE  (HCC): Primary | ICD-10-CM

## 2024-02-15 LAB — HGB BLD-MCNC: 8.9 G/DL

## 2024-02-15 PROCEDURE — 36415 COLL VENOUS BLD VENIPUNCTURE: CPT

## 2024-02-15 PROCEDURE — 96372 THER/PROPH/DIAG INJ SC/IM: CPT

## 2024-02-15 PROCEDURE — 85018 HEMOGLOBIN: CPT

## 2024-02-15 NOTE — PROGRESS NOTES
Education Record    Learner:  Patient    Disease / Diagnosis: patient  here for retacrit inj    Barriers / Limitations:  None    Method:  Brief focused, printed material and  reinforcement    General Topics:  Plan of care reviewed    Outcome:  Shows understanding   Patient  hgb 8.9, retacrit administered for per. Patient  dc'd home in stable condition.

## 2024-03-06 ENCOUNTER — LAB ENCOUNTER (OUTPATIENT)
Dept: LAB | Age: 89
End: 2024-03-06
Attending: INTERNAL MEDICINE
Payer: MEDICARE

## 2024-03-06 DIAGNOSIS — N18.4 STAGE 4 CHRONIC KIDNEY DISEASE (HCC): ICD-10-CM

## 2024-03-06 LAB
ANION GAP SERPL CALC-SCNC: 6 MMOL/L (ref 0–18)
BUN BLD-MCNC: 84 MG/DL (ref 9–23)
CALCIUM BLD-MCNC: 9.2 MG/DL (ref 8.5–10.1)
CHLORIDE SERPL-SCNC: 104 MMOL/L (ref 98–112)
CO2 SERPL-SCNC: 23 MMOL/L (ref 21–32)
CREAT BLD-MCNC: 3.64 MG/DL
EGFRCR SERPLBLD CKD-EPI 2021: 16 ML/MIN/1.73M2 (ref 60–?)
EST. AVERAGE GLUCOSE BLD GHB EST-MCNC: 154 MG/DL (ref 68–126)
FASTING STATUS PATIENT QL REPORTED: NO
GLUCOSE BLD-MCNC: 107 MG/DL (ref 70–99)
HBA1C MFR BLD: 7 % (ref ?–5.7)
HGB BLD-MCNC: 8.6 G/DL
MAGNESIUM SERPL-MCNC: 2.2 MG/DL (ref 1.6–2.6)
OSMOLALITY SERPL CALC.SUM OF ELEC: 302 MOSM/KG (ref 275–295)
PHOSPHATE SERPL-MCNC: 3.2 MG/DL (ref 2.5–4.9)
POTASSIUM SERPL-SCNC: 4.6 MMOL/L (ref 3.5–5.1)
PTH-INTACT SERPL-MCNC: 122.7 PG/ML (ref 18.5–88)
SODIUM SERPL-SCNC: 133 MMOL/L (ref 136–145)

## 2024-03-06 PROCEDURE — 80048 BASIC METABOLIC PNL TOTAL CA: CPT

## 2024-03-06 PROCEDURE — 83036 HEMOGLOBIN GLYCOSYLATED A1C: CPT

## 2024-03-06 PROCEDURE — 85018 HEMOGLOBIN: CPT

## 2024-03-06 PROCEDURE — 84100 ASSAY OF PHOSPHORUS: CPT

## 2024-03-06 PROCEDURE — 83735 ASSAY OF MAGNESIUM: CPT

## 2024-03-06 PROCEDURE — 36415 COLL VENOUS BLD VENIPUNCTURE: CPT

## 2024-03-06 PROCEDURE — 83970 ASSAY OF PARATHORMONE: CPT

## 2024-03-12 ENCOUNTER — OFFICE VISIT (OUTPATIENT)
Dept: NEPHROLOGY | Facility: CLINIC | Age: 89
End: 2024-03-12
Payer: MEDICARE

## 2024-03-12 VITALS — SYSTOLIC BLOOD PRESSURE: 130 MMHG | BODY MASS INDEX: 27 KG/M2 | DIASTOLIC BLOOD PRESSURE: 60 MMHG | WEIGHT: 145 LBS

## 2024-03-12 DIAGNOSIS — N18.4 STAGE 4 CHRONIC KIDNEY DISEASE (HCC): Primary | ICD-10-CM

## 2024-03-12 PROCEDURE — 99213 OFFICE O/P EST LOW 20 MIN: CPT | Performed by: INTERNAL MEDICINE

## 2024-03-12 RX ORDER — CALCITRIOL 0.25 UG/1
0.25 CAPSULE, LIQUID FILLED ORAL
Qty: 15 CAPSULE | Refills: 3 | Status: SHIPPED | OUTPATIENT
Start: 2024-03-13

## 2024-03-12 NOTE — PROGRESS NOTES
Nephrology Clinic Note      REASON:  CKD4         HPI:   Heavenly Perez is a 88 year old male with   No chief complaint on file.    Brayden Reich MD    89 y/o pleasant male with hx of HTN, CAD, DM, CKD - previously w/ [facility] who presents to establish care w/ our group  He is doing well  Denies any cp/sob  No n/v  NO edema     Has declined access planning- would prefer to get a HD cath when needs dialysis    Has thought about transplant- not interested now              ROS:  See above; 12 systems reviewed and otherwise unremarkable     PMH:  Past Medical History:   Diagnosis Date    Atherosclerosis of coronary artery     Diabetes (HCC)     Essential hypertension     Hearing impairment     no hearing aids    Heart attack (HCC)     High blood pressure     High cholesterol     History of angioplasty     History of quadruple bypass     Problems with swallowing     takes meds with applesauce    Renal disorder     ckd    Sleep apnea     no device or appliance         PSH:  Past Surgical History:   Procedure Laterality Date    CABG           Medications (Active prior to today's visit):  Current Outpatient Medications   Medication Sig Dispense Refill    Darbepoetin Orlin (ARANESP, ALBUMIN FREE,) 300 MCG/ML Injection Solution Inject 300 mcg as directed every 30 (thirty) days.      acetaminophen 500 MG Oral Tab Take 2 tablets (1,000 mg total) by mouth 2 (two) times daily as needed for Pain or Fever.      albuterol 108 (90 Base) MCG/ACT Inhalation Aero Soln Inhale 1 puff into the lungs as needed for Wheezing.      amLODIPine 5 MG Oral Tab Take 1 tablet (5 mg total) by mouth daily.      ascorbic acid 1000 MG Oral Tab Take 2 tablets (2,000 mg total) by mouth daily.      atorvastatin 40 MG Oral Tab Take 1 tablet (40 mg total) by mouth daily.      famotidine 40 MG Oral Tab Take 1 tablet (40 mg total) by mouth daily.      ferrous sulfate 325 (65 FE) MG Oral Tab EC Take 1 tablet (325 mg total) by mouth daily.       hydrALAZINE 100 MG Oral Tab Take 1 tablet (100 mg total) by mouth 3 (three) times daily.      isosorbide mononitrate ER 30 MG Oral Tablet 24 Hr Take 1 tablet (30 mg total) by mouth daily.      methIMAzole 5 MG Oral Tab Take 1 tablet (5 mg total) by mouth daily.      metoprolol succinate ER 25 MG Oral Tablet 24 Hr Take 1 tablet (25 mg total) by mouth daily.      Olopatadine HCl 0.7 % Ophthalmic Solution Place 1 drop into both ears daily as needed.      SITagliptin Phosphate 25 MG Oral Tab Take 1 tablet (25 mg total) by mouth daily.      tamsulosin 0.4 MG Oral Cap Take 1 capsule (0.4 mg total) by mouth daily.      traMADol 50 MG Oral Tab Take 1 tablet (50 mg total) by mouth every 8 (eight) hours as needed for Pain.      traZODone 50 MG Oral Tab Take 1 tablet (50 mg total) by mouth nightly.           Allergies:  No Known Allergies    Social History:  Social History     Socioeconomic History    Marital status: Unknown   Tobacco Use    Smoking status: Former    Smokeless tobacco: Never   Vaping Use    Vaping Use: Never used   Substance and Sexual Activity    Alcohol use: Yes     Comment: occasional    Drug use: Never          Family History:  No family history on file.         PHYSICAL EXAM:   There were no vitals taken for this visit.   Wt Readings from Last 6 Encounters:   02/08/24 145 lb (65.8 kg)   01/09/24 149 lb 9.6 oz (67.9 kg)   11/14/23 146 lb (66.2 kg)   11/07/23 145 lb (65.8 kg)   10/18/23 146 lb (66.2 kg)   09/20/23 146 lb 0.9 oz (66.3 kg)     General: Alert and oriented in no apparent distress.  HEENT: No scleral icterus, MMM  Neck: Supple, no ANDRE or thyromegaly  Cardiac: Regular rate and rhythm, MIGUELITO  Lungs: Clear  Abdomen: Soft, non-tender.  Extremities: Without clubbing, cyanosis or edema.  Neurologic:  normal affect, cranial nerves grossly intact, moving all extremities  Skin: Warm and dry, no rashes      Component      Latest Ref Rng 3/6/2024   Glucose      70 - 99 mg/dL 107 (H)    Sodium      136 - 145  mmol/L 133 (L)    Potassium      3.5 - 5.1 mmol/L 4.6    Chloride      98 - 112 mmol/L 104    Carbon Dioxide, Total      21.0 - 32.0 mmol/L 23.0    ANION GAP      0 - 18 mmol/L 6    BUN      9 - 23 mg/dL 84 (H)    CREATININE      0.70 - 1.30 mg/dL 3.64 (H)    CALCIUM      8.5 - 10.1 mg/dL 9.2    CALCULATED OSMOLALITY      275 - 295 mOsm/kg 302 (H)    EGFR      >=60 mL/min/1.73m2 16 (L)    Patient Fasting for BMP? No    HEMOGLOBIN A1c      <5.7 % 7.0 (H)    ESTIMATED AVERAGE GLUCOSE      68 - 126 mg/dL 154 (H)    Hemoglobin      13.0 - 17.5 g/dL 8.6 (L)    PHOSPHORUS      2.5 - 4.9 mg/dL 3.2    Magnesium, Serum      1.6 - 2.6 mg/dL 2.2    PTH INTACT      18.5 - 88.0 pg/mL 122.7 (H)       Legend:  (H) High  (L) Low     ASSESSMENT/PLAN:   There are no diagnoses linked to this encounter.        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 Romero   BMD - as above;   vit D 1000U daily; start calcitriol 0.25 3x/week  HTN- controlled; meds reviewed; cpm  DM-per primary   CAD- follows w/ Dr. Reddy   HL    F/u in 2-3 mos     Evens Rivers MD  3/12/2024

## 2024-03-14 ENCOUNTER — OFFICE VISIT (OUTPATIENT)
Dept: HEMATOLOGY/ONCOLOGY | Age: 89
End: 2024-03-14
Attending: INTERNAL MEDICINE
Payer: MEDICARE

## 2024-03-14 VITALS
BODY MASS INDEX: 27.38 KG/M2 | HEIGHT: 60.98 IN | OXYGEN SATURATION: 94 % | SYSTOLIC BLOOD PRESSURE: 111 MMHG | RESPIRATION RATE: 18 BRPM | WEIGHT: 145 LBS | HEART RATE: 63 BPM | TEMPERATURE: 98 F | DIASTOLIC BLOOD PRESSURE: 54 MMHG

## 2024-03-14 DIAGNOSIS — D63.1 ANEMIA ASSOCIATED WITH STAGE 4 CHRONIC RENAL FAILURE (HCC): Primary | ICD-10-CM

## 2024-03-14 DIAGNOSIS — N18.4 ANEMIA ASSOCIATED WITH STAGE 4 CHRONIC RENAL FAILURE (HCC): Primary | ICD-10-CM

## 2024-03-14 LAB — HGB BLD-MCNC: 8.4 G/DL

## 2024-03-14 PROCEDURE — 85018 HEMOGLOBIN: CPT

## 2024-03-14 PROCEDURE — 96372 THER/PROPH/DIAG INJ SC/IM: CPT

## 2024-03-14 PROCEDURE — 36415 COLL VENOUS BLD VENIPUNCTURE: CPT

## 2024-04-11 ENCOUNTER — OFFICE VISIT (OUTPATIENT)
Dept: HEMATOLOGY/ONCOLOGY | Age: 89
End: 2024-04-11
Attending: INTERNAL MEDICINE
Payer: MEDICARE

## 2024-04-11 VITALS
OXYGEN SATURATION: 92 % | SYSTOLIC BLOOD PRESSURE: 114 MMHG | DIASTOLIC BLOOD PRESSURE: 45 MMHG | HEART RATE: 63 BPM | BODY MASS INDEX: 27.26 KG/M2 | WEIGHT: 144.38 LBS | HEIGHT: 60.98 IN | TEMPERATURE: 98 F | RESPIRATION RATE: 18 BRPM

## 2024-04-11 DIAGNOSIS — N18.4 ANEMIA ASSOCIATED WITH STAGE 4 CHRONIC RENAL FAILURE (HCC): Primary | ICD-10-CM

## 2024-04-11 DIAGNOSIS — D63.1 ANEMIA ASSOCIATED WITH STAGE 4 CHRONIC RENAL FAILURE (HCC): Primary | ICD-10-CM

## 2024-04-11 LAB — HGB BLD-MCNC: 8 G/DL

## 2024-04-11 PROCEDURE — 85018 HEMOGLOBIN: CPT

## 2024-04-11 PROCEDURE — 36415 COLL VENOUS BLD VENIPUNCTURE: CPT

## 2024-04-11 PROCEDURE — 96372 THER/PROPH/DIAG INJ SC/IM: CPT

## 2024-04-11 NOTE — PROGRESS NOTES
Education Record    Learner:  Patient    Disease / Diagnosis: Retacrit injection    Barriers / Limitations:  None    Method:  Brief focused discussion and  reinforcement    General Topics:  Plan of care reviewed    Outcome:  Shows understanding. Pt arrived ambulating independently. Hgb 8.0 - Retacrit given per parameters. Pt discharged home in stable condition.

## 2024-05-08 ENCOUNTER — LAB ENCOUNTER (OUTPATIENT)
Dept: LAB | Age: 89
End: 2024-05-08
Attending: INTERNAL MEDICINE
Payer: MEDICARE

## 2024-05-08 DIAGNOSIS — N18.4 STAGE 4 CHRONIC KIDNEY DISEASE (HCC): ICD-10-CM

## 2024-05-08 DIAGNOSIS — N18.4 ANEMIA ASSOCIATED WITH STAGE 4 CHRONIC RENAL FAILURE (HCC): ICD-10-CM

## 2024-05-08 DIAGNOSIS — D63.1 ANEMIA ASSOCIATED WITH STAGE 4 CHRONIC RENAL FAILURE (HCC): ICD-10-CM

## 2024-05-08 LAB
ANION GAP SERPL CALC-SCNC: 11 MMOL/L (ref 0–18)
BASOPHILS # BLD AUTO: 0.02 X10(3) UL (ref 0–0.2)
BASOPHILS NFR BLD AUTO: 0.3 %
BUN BLD-MCNC: 90 MG/DL (ref 9–23)
CALCIUM BLD-MCNC: 9.9 MG/DL (ref 8.5–10.1)
CHLORIDE SERPL-SCNC: 108 MMOL/L (ref 98–112)
CO2 SERPL-SCNC: 19 MMOL/L (ref 21–32)
CREAT BLD-MCNC: 3.95 MG/DL
EGFRCR SERPLBLD CKD-EPI 2021: 14 ML/MIN/1.73M2 (ref 60–?)
EOSINOPHIL # BLD AUTO: 0.05 X10(3) UL (ref 0–0.7)
EOSINOPHIL NFR BLD AUTO: 0.6 %
ERYTHROCYTE [DISTWIDTH] IN BLOOD BY AUTOMATED COUNT: 15.2 %
EST. AVERAGE GLUCOSE BLD GHB EST-MCNC: 128 MG/DL (ref 68–126)
FASTING STATUS PATIENT QL REPORTED: YES
GLUCOSE BLD-MCNC: 136 MG/DL (ref 70–99)
HBA1C MFR BLD: 6.1 % (ref ?–5.7)
HCT VFR BLD AUTO: 31.2 %
HGB BLD-MCNC: 9.3 G/DL
IMM GRANULOCYTES # BLD AUTO: 0.02 X10(3) UL (ref 0–1)
IMM GRANULOCYTES NFR BLD: 0.3 %
LYMPHOCYTES # BLD AUTO: 1.14 X10(3) UL (ref 1–4)
LYMPHOCYTES NFR BLD AUTO: 14.4 %
MAGNESIUM SERPL-MCNC: 2.6 MG/DL (ref 1.6–2.6)
MCH RBC QN AUTO: 20.9 PG (ref 26–34)
MCHC RBC AUTO-ENTMCNC: 29.8 G/DL (ref 31–37)
MCV RBC AUTO: 70 FL
MONOCYTES # BLD AUTO: 0.73 X10(3) UL (ref 0.1–1)
MONOCYTES NFR BLD AUTO: 9.3 %
NEUTROPHILS # BLD AUTO: 5.93 X10 (3) UL (ref 1.5–7.7)
NEUTROPHILS # BLD AUTO: 5.93 X10(3) UL (ref 1.5–7.7)
NEUTROPHILS NFR BLD AUTO: 75.1 %
OSMOLALITY SERPL CALC.SUM OF ELEC: 316 MOSM/KG (ref 275–295)
PLATELET # BLD AUTO: 207 10(3)UL (ref 150–450)
PLATELETS.RETICULATED NFR BLD AUTO: 9.5 % (ref 0–7)
POTASSIUM SERPL-SCNC: 4 MMOL/L (ref 3.5–5.1)
PTH-INTACT SERPL-MCNC: 66.9 PG/ML (ref 18.5–88)
RBC # BLD AUTO: 4.46 X10(6)UL
SODIUM SERPL-SCNC: 138 MMOL/L (ref 136–145)
WBC # BLD AUTO: 7.9 X10(3) UL (ref 4–11)

## 2024-05-08 PROCEDURE — 83550 IRON BINDING TEST: CPT

## 2024-05-08 PROCEDURE — 80048 BASIC METABOLIC PNL TOTAL CA: CPT

## 2024-05-08 PROCEDURE — 85025 COMPLETE CBC W/AUTO DIFF WBC: CPT

## 2024-05-08 PROCEDURE — 83540 ASSAY OF IRON: CPT

## 2024-05-08 PROCEDURE — 83970 ASSAY OF PARATHORMONE: CPT

## 2024-05-08 PROCEDURE — 83735 ASSAY OF MAGNESIUM: CPT

## 2024-05-08 PROCEDURE — 36415 COLL VENOUS BLD VENIPUNCTURE: CPT

## 2024-05-08 PROCEDURE — 82728 ASSAY OF FERRITIN: CPT

## 2024-05-08 PROCEDURE — 83036 HEMOGLOBIN GLYCOSYLATED A1C: CPT

## 2024-05-09 ENCOUNTER — OFFICE VISIT (OUTPATIENT)
Dept: HEMATOLOGY/ONCOLOGY | Age: 89
End: 2024-05-09
Attending: INTERNAL MEDICINE
Payer: MEDICARE

## 2024-05-09 VITALS
RESPIRATION RATE: 18 BRPM | SYSTOLIC BLOOD PRESSURE: 113 MMHG | DIASTOLIC BLOOD PRESSURE: 51 MMHG | OXYGEN SATURATION: 96 % | TEMPERATURE: 98 F | HEART RATE: 61 BPM | WEIGHT: 139 LBS | BODY MASS INDEX: 26.24 KG/M2 | HEIGHT: 60.98 IN

## 2024-05-09 DIAGNOSIS — N18.4 ANEMIA ASSOCIATED WITH STAGE 4 CHRONIC RENAL FAILURE (HCC): Primary | ICD-10-CM

## 2024-05-09 DIAGNOSIS — D63.1 ANEMIA ASSOCIATED WITH STAGE 4 CHRONIC RENAL FAILURE (HCC): Primary | ICD-10-CM

## 2024-05-09 LAB
DEPRECATED HBV CORE AB SER IA-ACNC: 511.7 NG/ML
IRON SATN MFR SERPL: 26 %
IRON SERPL-MCNC: 71 UG/DL
TIBC SERPL-MCNC: 273 UG/DL (ref 240–450)
TRANSFERRIN SERPL-MCNC: 183 MG/DL (ref 200–360)

## 2024-05-09 PROCEDURE — 96372 THER/PROPH/DIAG INJ SC/IM: CPT

## 2024-05-09 PROCEDURE — 99214 OFFICE O/P EST MOD 30 MIN: CPT | Performed by: INTERNAL MEDICINE

## 2024-05-09 NOTE — PROGRESS NOTES
Cancer Center Progress Note  Patient Name: Heavenly Perez   YOB: 1935   Medical Record Number: [id number]   CSN: [id number]   Attending Physician: Ananda Romero M.D.       Date of Visit: 5/9/2024     Chief Complaint:  No chief complaint on file.       Oncologic History:  Heavenly Perez is a 88 year old male referred by Dr. Rivers for evaluation of anemia.  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 He was getting it at [facility] before moving his nephrology care to [place]. Starting at [facility], he was transitioned to Aranesp 300mcg monthly. He reports that he has tolerated it well, but he feels that his count has risen too high, and the Aranesp is far more expensive than the CANDE he was getting at [facility] (Retacrit).  He is referred to see if he can get his dosing here, with retacrit.     History of Present Illness:  Pt is here for follow up. His energy levelo is good. No complaints other than arthritis.     Performance Status:  ECOG 1    Past Medical History:  Past Medical History:    Atherosclerosis of coronary artery    Diabetes (HCC)    Essential hypertension    Hearing impairment    no hearing aids    Heart attack (HCC)    High blood pressure    High cholesterol    History of angioplasty    History of quadruple bypass    Problems with swallowing    takes meds with applesauce    Renal disorder    ckd    Sleep apnea    no device or appliance       Past Surgical History:  Past Surgical History:   Procedure Laterality Date    Cabg         Family History:  No family history on file.    Social History:  Social History     Socioeconomic History    Marital status: Unknown     Spouse name: Not on file    Number of children: Not on file    Years of education: Not on file    Highest education level: Not on file   Occupational History    Not on file   Tobacco Use    Smoking status: Former    Smokeless tobacco: Never    Vaping Use    Vaping status: Never Used   Substance and Sexual Activity    Alcohol use: Yes     Comment: occasional    Drug use: Never    Sexual activity: Not on file   Other Topics Concern    Not on file   Social History Narrative    Not on file     Social Determinants of Health     Financial Resource Strain: Not on file   Food Insecurity: Not on file   Transportation Needs: Not on file   Physical Activity: Not on file   Stress: Not on file   Social Connections: Not on file   Housing Stability: Not on file       Current Medications:    Current Outpatient Medications:     calcitriol 0.25 MCG Oral Cap, Take 1 capsule (0.25 mcg total) by mouth Every Monday, Wednesday, and Friday., Disp: 15 capsule, Rfl: 3    Darbepoetin Orlin (ARANESP, ALBUMIN FREE,) 300 MCG/ML Injection Solution, Inject 300 mcg as directed every 30 (thirty) days., Disp: , Rfl:     acetaminophen 500 MG Oral Tab, Take 2 tablets (1,000 mg total) by mouth 2 (two) times daily as needed for Pain or Fever., Disp: , Rfl:     albuterol 108 (90 Base) MCG/ACT Inhalation Aero Soln, Inhale 1 puff into the lungs as needed for Wheezing., Disp: , Rfl:     amLODIPine 5 MG Oral Tab, Take 1 tablet (5 mg total) by mouth daily., Disp: , Rfl:     ascorbic acid 1000 MG Oral Tab, Take 2 tablets (2,000 mg total) by mouth daily., Disp: , Rfl:     atorvastatin 40 MG Oral Tab, Take 1 tablet (40 mg total) by mouth daily., Disp: , Rfl:     famotidine 40 MG Oral Tab, Take 1 tablet (40 mg total) by mouth daily., Disp: , Rfl:     ferrous sulfate 325 (65 FE) MG Oral Tab EC, Take 1 tablet (325 mg total) by mouth daily., Disp: , Rfl:     hydrALAZINE 100 MG Oral Tab, Take 1 tablet (100 mg total) by mouth 3 (three) times daily., Disp: , Rfl:     isosorbide mononitrate ER 30 MG Oral Tablet 24 Hr, Take 1 tablet (30 mg total) by mouth daily., Disp: , Rfl:     methIMAzole 5 MG Oral Tab, Take 1 tablet (5 mg total) by mouth daily., Disp: , Rfl:     metoprolol succinate ER 25 MG Oral Tablet 24  Hr, Take 1 tablet (25 mg total) by mouth daily., Disp: , Rfl:     Olopatadine HCl 0.7 % Ophthalmic Solution, Place 1 drop into both ears daily as needed., Disp: , Rfl:     SITagliptin Phosphate 25 MG Oral Tab, Take 1 tablet (25 mg total) by mouth daily., Disp: , Rfl:     tamsulosin 0.4 MG Oral Cap, Take 1 capsule (0.4 mg total) by mouth daily., Disp: , Rfl:     traMADol 50 MG Oral Tab, Take 1 tablet (50 mg total) by mouth every 8 (eight) hours as needed for Pain., Disp: , Rfl:     traZODone 50 MG Oral Tab, Take 1 tablet (50 mg total) by mouth nightly., Disp: , Rfl:     Allergies:  No Known Allergies     Review of Systems:    Constitutional No fevers, chills, night sweats, excessive fatigue or weight loss.   Eyes No significant visual difficulties. No diplopia. No yellowing of the eyes.   Hematologic/Lymphatic No easy bruising or bleeding.  No any tender or palpable lymph nodes.   Respiratory No dyspnea, Pleuritic chest pain, cough or hemoptysis.   Cardiovascular No anginal chest pain, palpitations or orthopnea.   Gastrointestinal No nausea, vomiting, diarrhea, GI bleeding, or constipation.   Genitorurinary (M) No hematuria, dysuria, or incontinence. No abnormal bleeding.   Integumentary No rashes or yellowing of the skin   Neurologic No headache, blurred vision, and no areas of focal weakness. Normal gait.   Psychiatric No insomnia, depression, joseph or mood swings.         Vital Signs:  /51 (BP Location: Left arm, Patient Position: Sitting, Cuff Size: adult)   Pulse 61   Temp 97.5 °F (36.4 °C) (Tympanic)   Resp 18   Ht 1.549 m (5' 0.98\")   Wt 63 kg (139 lb)   SpO2 96%   BMI 26.28 kg/m²     Physical Examination:      Laboratory:   Latest Reference Range & Units 05/08/24 09:57   ESTIMATED AVERAGE GLUCOSE 68 - 126 mg/dL 128 (H)   Sodium 136 - 145 mmol/L 138   Potassium 3.5 - 5.1 mmol/L 4.0   Chloride 98 - 112 mmol/L 108   Carbon Dioxide, Total 21.0 - 32.0 mmol/L 19.0 (L)   BUN 9 - 23 mg/dL 90 (H)    CREATININE 0.70 - 1.30 mg/dL 3.95 (H)   CALCIUM 8.5 - 10.1 mg/dL 9.9   EGFR >=60 mL/min/1.73m2 14 (L)   ANION GAP 0 - 18 mmol/L 11   CALCULATED OSMOLALITY 275 - 295 mOsm/kg 316 (H)   Magnesium, Serum 1.6 - 2.6 mg/dL 2.6   Iron, Serum 65 - 175 ug/dL 71   Transferrin 200 - 360 mg/dL 183 (L)   Iron Bind.Cap.(TIBC) 240 - 450 ug/dL 273   Iron Saturation 20 - 50 % 26   FERRITIN 30.0 - 530.0 ng/mL 511.7   (H): Data is abnormally high  (L): Data is abnormally low     Latest Reference Range & Units 05/08/24 09:57   WBC 4.0 - 11.0 x10(3) uL 7.9   Hemoglobin 13.0 - 17.5 g/dL 9.3 (L)   Hematocrit 39.0 - 53.0 % 31.2 (L)   Platelet Count 150.0 - 450.0 10(3)uL 207.0   RBC 3.80 - 5.80 x10(6)uL 4.46   MCH 26.0 - 34.0 pg 20.9 (L)   MCHC 31.0 - 37.0 g/dL 29.8 (L)   MCV 80.0 - 100.0 fL 70.0 (L)   RDW % 15.2   Immature Platelet Fraction 0.0 - 7.0 % 9.5 (H)   Prelim Neutrophil Abs 1.50 - 7.70 x10 (3) uL 5.93   Neutrophils Absolute 1.50 - 7.70 x10(3) uL 5.93   Lymphocytes Absolute 1.00 - 4.00 x10(3) uL 1.14   Monocytes Absolute 0.10 - 1.00 x10(3) uL 0.73   Eosinophils Absolute 0.00 - 0.70 x10(3) uL 0.05   Basophils Absolute 0.00 - 0.20 x10(3) uL 0.02   Immature Granulocyte Absolute 0.00 - 1.00 x10(3) uL 0.02   Neutrophils % % 75.1   Lymphocytes % % 14.4   Monocytes % % 9.3   Eosinophils % % 0.6   Basophils % % 0.3   Immature Granulocyte % % 0.3   (L): Data is abnormally low  (H): Data is abnormally high  Radiology:    Pathology:    Impression and Plan:  Anemia of renal insufficiency.Hgb 9.3 yesterday. Will proceed with his injection today. Continue monthly injections for hgb <10. If he is consistently less than 9 during this 3 month period, will plan to increase the frequency of the shot. His iron stores are replete.     Planned Follow Up:  Monthly labs with injection  Q3 Month labs    I spent * minutes face to face with the patient.  More than 50% of that time was spent counseling the patient and/or on coordination of care.  The  diagnosis, prognosis, and general treatment was explained to the patient and the family.    Electronically Signed by:    Ananda Romero M.D.  [facility]

## 2024-05-09 NOTE — PROGRESS NOTES
Pt here for follow up and injection.  He complains of arthritis pain.    Outpatient Oncology Care Plan  Problem list:  knowledge deficit    Problems related to:    disease/disease progression    Interventions:  provided general teaching    Expected outcomes:  understands plan of care    Progress towards outcome:  making progress    Education Record    Learner:  Patient  Barriers / Limitations:  None  Method:  Brief focused  Outcome:  Shows understanding  Comments:

## 2024-05-14 ENCOUNTER — OFFICE VISIT (OUTPATIENT)
Dept: NEPHROLOGY | Facility: CLINIC | Age: 89
End: 2024-05-14

## 2024-05-14 VITALS — SYSTOLIC BLOOD PRESSURE: 122 MMHG | BODY MASS INDEX: 27 KG/M2 | WEIGHT: 142 LBS | DIASTOLIC BLOOD PRESSURE: 58 MMHG

## 2024-05-14 DIAGNOSIS — N18.4 CKD (CHRONIC KIDNEY DISEASE) STAGE 4, GFR 15-29 ML/MIN (HCC): Primary | ICD-10-CM

## 2024-05-14 PROCEDURE — 99213 OFFICE O/P EST LOW 20 MIN: CPT | Performed by: INTERNAL MEDICINE

## 2024-05-14 NOTE — PROGRESS NOTES
Nephrology Clinic Note      REASON:  CKD4         HPI:   Heavenly Perez is a 88 year old male with   No chief complaint on file.    Brayden Reich MD    87 y/o pleasant male with hx of HTN, CAD, DM, CKD - previously / [facility] who presents for follow up   He is doing well  Denies any cp/sob  No n/v  NO edema  + back pain - following w/ ortho      Has declined access planning- would prefer to get a HD cath when needs dialysis    Has thought about transplant- not interested now              ROS:  See above; 12 systems reviewed and otherwise unremarkable     PMH:  Past Medical History:    Atherosclerosis of coronary artery    Diabetes (HCC)    Essential hypertension    Hearing impairment    no hearing aids    Heart attack (HCC)    High blood pressure    High cholesterol    History of angioplasty    History of quadruple bypass    Problems with swallowing    takes meds with applesauce    Renal disorder    ckd    Sleep apnea    no device or appliance         PSH:  Past Surgical History:   Procedure Laterality Date    Cabg           Medications (Active prior to today's visit):  Current Outpatient Medications   Medication Sig Dispense Refill    calcitriol 0.25 MCG Oral Cap Take 1 capsule (0.25 mcg total) by mouth Every Monday, Wednesday, and Friday. 15 capsule 3    Darbepoetin Orlin (ARANESP, ALBUMIN FREE,) 300 MCG/ML Injection Solution Inject 300 mcg as directed every 30 (thirty) days.      acetaminophen 500 MG Oral Tab Take 2 tablets (1,000 mg total) by mouth 2 (two) times daily as needed for Pain or Fever.      albuterol 108 (90 Base) MCG/ACT Inhalation Aero Soln Inhale 1 puff into the lungs as needed for Wheezing.      amLODIPine 5 MG Oral Tab Take 1 tablet (5 mg total) by mouth daily.      ascorbic acid 1000 MG Oral Tab Take 2 tablets (2,000 mg total) by mouth daily.      atorvastatin 40 MG Oral Tab Take 1 tablet (40 mg total) by mouth daily.      famotidine 40 MG Oral Tab Take 1 tablet (40 mg total)  by mouth daily.      ferrous sulfate 325 (65 FE) MG Oral Tab EC Take 1 tablet (325 mg total) by mouth daily.      hydrALAZINE 100 MG Oral Tab Take 1 tablet (100 mg total) by mouth 3 (three) times daily.      isosorbide mononitrate ER 30 MG Oral Tablet 24 Hr Take 1 tablet (30 mg total) by mouth daily.      methIMAzole 5 MG Oral Tab Take 1 tablet (5 mg total) by mouth daily.      metoprolol succinate ER 25 MG Oral Tablet 24 Hr Take 1 tablet (25 mg total) by mouth daily.      Olopatadine HCl 0.7 % Ophthalmic Solution Place 1 drop into both ears daily as needed.      SITagliptin Phosphate 25 MG Oral Tab Take 1 tablet (25 mg total) by mouth daily.      tamsulosin 0.4 MG Oral Cap Take 1 capsule (0.4 mg total) by mouth daily.      traMADol 50 MG Oral Tab Take 1 tablet (50 mg total) by mouth every 8 (eight) hours as needed for Pain.      traZODone 50 MG Oral Tab Take 1 tablet (50 mg total) by mouth nightly.           Allergies:  No Known Allergies    Social History:  Social History     Socioeconomic History    Marital status: Unknown   Tobacco Use    Smoking status: Former    Smokeless tobacco: Never   Vaping Use    Vaping status: Never Used   Substance and Sexual Activity    Alcohol use: Yes     Comment: occasional    Drug use: Never          Family History:  No family history on file.         PHYSICAL EXAM:   There were no vitals taken for this visit.   Wt Readings from Last 6 Encounters:   05/09/24 139 lb (63 kg)   04/11/24 144 lb 6.4 oz (65.5 kg)   03/14/24 145 lb (65.8 kg)   03/12/24 145 lb (65.8 kg)   02/08/24 145 lb (65.8 kg)   01/09/24 149 lb 9.6 oz (67.9 kg)     General: Alert and oriented in no apparent distress.  HEENT: No scleral icterus, MMM  Neck: Supple, no ANDRE or thyromegaly  Cardiac: Regular rate and rhythm, MIGUELITO  Lungs: Clear  Abdomen: Soft, non-tender.  Extremities: Without clubbing, cyanosis or edema.  Neurologic:  normal affect, cranial nerves grossly intact, moving all extremities  Skin: Warm and dry,  no rashes    Component      Latest Ref [facility] 5/8/2024   WBC      4.0 - 11.0 x10(3) uL 7.9    RBC      3.80 - 5.80 x10(6)uL 4.46    Hemoglobin      13.0 - 17.5 g/dL 9.3 (L)    Hematocrit      39.0 - 53.0 % 31.2 (L)    Platelet Count      150.0 - 450.0 10(3)uL 207.0    Immature Platelet Fraction      0.0 - 7.0 % 9.5 (H)    MCV      80.0 - 100.0 fL 70.0 (L)    MCH      26.0 - 34.0 pg 20.9 (L)    MCHC      31.0 - 37.0 g/dL 29.8 (L)    RDW      % 15.2    Prelim Neutrophil Abs      1.50 - 7.70 x10 (3) uL 5.93    Neutrophils Absolute      1.50 - 7.70 x10(3) uL 5.93    Lymphocytes Absolute      1.00 - 4.00 x10(3) uL 1.14    Monocytes Absolute      0.10 - 1.00 x10(3) uL 0.73    Eosinophils Absolute      0.00 - 0.70 x10(3) uL 0.05    Basophils Absolute      0.00 - 0.20 x10(3) uL 0.02    Immature Granulocyte Absolute      0.00 - 1.00 x10(3) uL 0.02    Neutrophils %      % 75.1    Lymphocytes %      % 14.4    Monocytes %      % 9.3    Eosinophils %      % 0.6    Basophils %      % 0.3    Immature Granulocyte %      % 0.3    Glucose      70 - 99 mg/dL 136 (H)    Sodium      136 - 145 mmol/L 138    Potassium      3.5 - 5.1 mmol/L 4.0    Chloride      98 - 112 mmol/L 108    Carbon Dioxide, Total      21.0 - 32.0 mmol/L 19.0 (L)    ANION GAP      0 - 18 mmol/L 11    BUN      9 - 23 mg/dL 90 (H)    CREATININE      0.70 - 1.30 mg/dL 3.95 (H)    CALCIUM      8.5 - 10.1 mg/dL 9.9    CALCULATED OSMOLALITY      275 - 295 mOsm/kg 316 (H)    EGFR      >=60 mL/min/1.73m2 14 (L)    Patient Fasting for BMP? Yes    Iron, Serum      65 - 175 ug/dL 71    Transferrin      200 - 360 mg/dL 183 (L)    Iron Bind.Cap.(TIBC)      240 - 450 ug/dL 273    Iron Saturation      20 - 50 % 26    HEMOGLOBIN A1c      <5.7 % 6.1 (H)    ESTIMATED AVERAGE GLUCOSE      68 - 126 mg/dL 128 (H)    Magnesium, Serum      1.6 - 2.6 mg/dL 2.6    PTH INTACT      18.5 - 88.0 pg/mL 66.9    FERRITIN      30.0 - 530.0 ng/mL 511.7       Legend:  (L) Low  (H) High      ASSESSMENT/PLAN:   There are no diagnoses linked to this encounter.        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 Romero   BMD - as above;   vit D 1000U daily;   calcitriol 0.25 3x/week- cpm  HTN- controlled; meds reviewed; cpm  DM-per primary   CAD- follows w/ Dr. Reddy   HL    F/u in 2 mos ; plans to go to [facility] for 3 wks coming up     Evens Rivers MD  5/14/2024

## 2024-05-31 ENCOUNTER — TELEPHONE (OUTPATIENT)
Dept: HEMATOLOGY/ONCOLOGY | Facility: HOSPITAL | Age: 89
End: 2024-05-31

## 2024-06-06 ENCOUNTER — APPOINTMENT (OUTPATIENT)
Dept: HEMATOLOGY/ONCOLOGY | Age: 89
End: 2024-06-06
Attending: INTERNAL MEDICINE
Payer: MEDICARE

## 2024-06-07 ENCOUNTER — OFFICE VISIT (OUTPATIENT)
Dept: HEMATOLOGY/ONCOLOGY | Age: 89
End: 2024-06-07
Attending: INTERNAL MEDICINE
Payer: MEDICARE

## 2024-06-07 DIAGNOSIS — D63.1 ANEMIA ASSOCIATED WITH STAGE 4 CHRONIC RENAL FAILURE (HCC): Primary | ICD-10-CM

## 2024-06-07 DIAGNOSIS — N18.4 ANEMIA ASSOCIATED WITH STAGE 4 CHRONIC RENAL FAILURE (HCC): Primary | ICD-10-CM

## 2024-06-07 LAB — HGB BLD-MCNC: 9.5 G/DL

## 2024-06-07 PROCEDURE — 85018 HEMOGLOBIN: CPT

## 2024-06-07 PROCEDURE — 36415 COLL VENOUS BLD VENIPUNCTURE: CPT

## 2024-06-07 PROCEDURE — 96372 THER/PROPH/DIAG INJ SC/IM: CPT

## 2024-07-03 ENCOUNTER — APPOINTMENT (OUTPATIENT)
Dept: HEMATOLOGY/ONCOLOGY | Age: 89
End: 2024-07-03
Attending: INTERNAL MEDICINE
Payer: MEDICARE

## 2024-07-09 ENCOUNTER — OFFICE VISIT (OUTPATIENT)
Dept: HEMATOLOGY/ONCOLOGY | Age: 89
End: 2024-07-09
Attending: INTERNAL MEDICINE
Payer: MEDICARE

## 2024-07-09 DIAGNOSIS — N18.4 ANEMIA ASSOCIATED WITH STAGE 4 CHRONIC RENAL FAILURE (HCC): Primary | ICD-10-CM

## 2024-07-09 DIAGNOSIS — D63.1 ANEMIA ASSOCIATED WITH STAGE 4 CHRONIC RENAL FAILURE (HCC): Primary | ICD-10-CM

## 2024-07-09 LAB — HGB BLD-MCNC: 9.6 G/DL

## 2024-07-09 PROCEDURE — 36415 COLL VENOUS BLD VENIPUNCTURE: CPT

## 2024-07-09 PROCEDURE — 96372 THER/PROPH/DIAG INJ SC/IM: CPT

## 2024-07-09 PROCEDURE — 85018 HEMOGLOBIN: CPT

## 2024-07-09 NOTE — PROGRESS NOTES
Education Record    Learner:  Patient    Disease / Diagnosis: Retacrit injection    Barriers / Limitations:  None    Method:  Brief focused discussion and  reinforcement    General Topics:  Plan of care reviewed    Outcome:  Shows understanding. Pt arrived ambulating independently, no c/o. Hgb 9.6 - Retacrit given per orders. Pt tolerated injection without complication. Discharged home in stable condition - will get follow up appt from [facility].

## 2024-07-15 DIAGNOSIS — N18.4 STAGE 4 CHRONIC KIDNEY DISEASE (HCC): ICD-10-CM

## 2024-07-15 RX ORDER — CALCITRIOL 0.25 UG/1
0.25 CAPSULE, LIQUID FILLED ORAL
Qty: 36 CAPSULE | Refills: 1 | Status: SHIPPED | OUTPATIENT
Start: 2024-07-15

## 2024-07-18 ENCOUNTER — LAB ENCOUNTER (OUTPATIENT)
Dept: LAB | Age: 89
End: 2024-07-18
Attending: INTERNAL MEDICINE
Payer: MEDICARE

## 2024-07-18 DIAGNOSIS — N18.4 STAGE 4 CHRONIC KIDNEY DISEASE (HCC): ICD-10-CM

## 2024-07-18 LAB — HGB BLD-MCNC: 9.8 G/DL

## 2024-07-18 PROCEDURE — 85018 HEMOGLOBIN: CPT

## 2024-07-18 PROCEDURE — 36415 COLL VENOUS BLD VENIPUNCTURE: CPT

## 2024-07-23 ENCOUNTER — OFFICE VISIT (OUTPATIENT)
Dept: NEPHROLOGY | Facility: CLINIC | Age: 89
End: 2024-07-23
Payer: MEDICARE

## 2024-07-23 VITALS — SYSTOLIC BLOOD PRESSURE: 130 MMHG | DIASTOLIC BLOOD PRESSURE: 60 MMHG | BODY MASS INDEX: 26 KG/M2 | WEIGHT: 138.19 LBS

## 2024-07-23 DIAGNOSIS — N18.4 STAGE 4 CHRONIC KIDNEY DISEASE (HCC): Primary | ICD-10-CM

## 2024-07-23 PROBLEM — J41.0 SMOKERS' COUGH (HCC): Chronic | Status: ACTIVE | Noted: 2024-07-23

## 2024-07-23 PROBLEM — N18.5 CKD (CHRONIC KIDNEY DISEASE) STAGE 5, GFR LESS THAN 15 ML/MIN (HCC): Chronic | Status: ACTIVE | Noted: 2024-07-23

## 2024-07-23 PROCEDURE — 99213 OFFICE O/P EST LOW 20 MIN: CPT | Performed by: INTERNAL MEDICINE

## 2024-07-23 RX ORDER — PREDNISONE 20 MG/1
TABLET ORAL
Qty: 6 TABLET | Refills: 3 | Status: SHIPPED | OUTPATIENT
Start: 2024-07-23

## 2024-07-23 RX ORDER — ALLOPURINOL 100 MG/1
50 TABLET ORAL DAILY
Qty: 15 TABLET | Refills: 3 | Status: SHIPPED | OUTPATIENT
Start: 2024-07-23

## 2024-07-23 NOTE — PROGRESS NOTES
Nephrology Clinic Note      REASON:  CKD4         HPI:   Heavenly Perez is a 88 year old male with   Chief Complaint   Patient presents with    Acute Renal Injury    Chronic Kidney Disease    Hypokalemia     Brayden Reich MD    87 y/o pleasant male with hx of HTN, CAD, DM, CKD - previously w/ [facility] who presents for follow up   He is doing well  Denies any cp/sob  No n/v  NO edema    Had a episode of gout on recen trip to [facility]- states he was eating more meats (gyros/etc) while on the trip and seems to associate the change in diet with his illness.   Treated w/ prednisone x 5 days- improved/resolved symptoms now.      Has declined access planning- would prefer to get a HD cath when needs dialysis                   ROS:  See above; 12 systems reviewed and otherwise unremarkable     PMH:  Past Medical History:    Atherosclerosis of coronary artery    Diabetes (HCC)    Essential hypertension    Hearing impairment    no hearing aids    Heart attack (HCC)    High blood pressure    High cholesterol    History of angioplasty    History of quadruple bypass    Problems with swallowing    takes meds with applesauce    Renal disorder    ckd    Sleep apnea    no device or appliance         PSH:  Past Surgical History:   Procedure Laterality Date    Cabg           Medications (Active prior to today's visit):  Current Outpatient Medications   Medication Sig Dispense Refill    calcitriol 0.25 MCG Oral Cap Take 1 capsule (0.25 mcg total) by mouth Every Monday, Wednesday, and Friday. 36 capsule 1    Darbepoetin Orlin (ARANESP, ALBUMIN FREE,) 300 MCG/ML Injection Solution Inject 300 mcg as directed every 30 (thirty) days.      acetaminophen 500 MG Oral Tab Take 2 tablets (1,000 mg total) by mouth 2 (two) times daily as needed for Pain or Fever.      albuterol 108 (90 Base) MCG/ACT Inhalation Aero Soln Inhale 1 puff into the lungs as needed for Wheezing.      amLODIPine 5 MG Oral Tab Take 1 tablet (5 mg total) by  mouth daily.      ascorbic acid 1000 MG Oral Tab Take 2 tablets (2,000 mg total) by mouth daily.      atorvastatin 40 MG Oral Tab Take 1 tablet (40 mg total) by mouth daily.      famotidine 40 MG Oral Tab Take 1 tablet (40 mg total) by mouth daily.      ferrous sulfate 325 (65 FE) MG Oral Tab EC Take 1 tablet (325 mg total) by mouth daily.      hydrALAZINE 100 MG Oral Tab Take 1 tablet (100 mg total) by mouth 3 (three) times daily.      isosorbide mononitrate ER 30 MG Oral Tablet 24 Hr Take 1 tablet (30 mg total) by mouth daily.      methIMAzole 5 MG Oral Tab Take 1 tablet (5 mg total) by mouth daily.      metoprolol succinate ER 25 MG Oral Tablet 24 Hr Take 1 tablet (25 mg total) by mouth daily.      Olopatadine HCl 0.7 % Ophthalmic Solution Place 1 drop into both ears daily as needed.      SITagliptin Phosphate 25 MG Oral Tab Take 1 tablet (25 mg total) by mouth daily.      tamsulosin 0.4 MG Oral Cap Take 1 capsule (0.4 mg total) by mouth daily.      traMADol 50 MG Oral Tab Take 1 tablet (50 mg total) by mouth every 8 (eight) hours as needed for Pain.      traZODone 50 MG Oral Tab Take 1 tablet (50 mg total) by mouth nightly.           Allergies:  No Known Allergies    Social History:  Social History     Socioeconomic History    Marital status: Unknown   Tobacco Use    Smoking status: Former    Smokeless tobacco: Never   Vaping Use    Vaping status: Never Used   Substance and Sexual Activity    Alcohol use: Yes     Comment: occasional    Drug use: Never          Family History:  No family history on file.         PHYSICAL EXAM:   /60 (BP Location: Right arm)   Wt 138 lb 3.2 oz (62.7 kg)   BMI 26.13 kg/m²    Wt Readings from Last 6 Encounters:   07/23/24 138 lb 3.2 oz (62.7 kg)   05/14/24 142 lb (64.4 kg)   05/09/24 139 lb (63 kg)   04/11/24 144 lb 6.4 oz (65.5 kg)   03/14/24 145 lb (65.8 kg)   03/12/24 145 lb (65.8 kg)     General: Alert and oriented in no apparent distress.  HEENT: No scleral icterus,  MMM  Neck: Supple, no ANDRE or thyromegaly  Cardiac: Regular rate and rhythm, MIGUELITO  Lungs: Clear  Abdomen: Soft, non-tender.  Extremities: Without clubbing, cyanosis or edema.  Neurologic:  normal affect, cranial nerves grossly intact, moving all extremities  Skin: Warm and dry, no rashes    Labs reviewed     ASSESSMENT/PLAN:   There are no diagnoses linked to this encounter.        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 Type 2 PCKD- no family hx per patient   He has had prior discussions about dialysis  - as noted above  - no to transplant per patient   - no med changes  - volume looks good  - labs as noted above  - vit D normal  - monitor labs    - not a candidate for SGLT2i/Kerendia  Anemia - follows w/ Dr. Romero   BMD - as above;   vit D 1000U daily;   calcitriol 0.25 3x/week- cpm  HTN- controlled; meds reviewed; cpm  DM-per primary   CAD- follows w/ Dr. Maureen HENSON  Recent gout- now resolved; uric acid 7.5; will start low dose allopurinol - 50 mg daily (risk/benefits/SEs reviewed); will give script for pred if new attack     F/u in 2 mos     Evens Rivers MD  7/23/2024

## 2024-08-01 ENCOUNTER — OFFICE VISIT (OUTPATIENT)
Dept: HEMATOLOGY/ONCOLOGY | Age: 89
End: 2024-08-01
Attending: INTERNAL MEDICINE
Payer: MEDICARE

## 2024-08-01 VITALS
RESPIRATION RATE: 18 BRPM | WEIGHT: 137.63 LBS | SYSTOLIC BLOOD PRESSURE: 121 MMHG | HEIGHT: 60.98 IN | BODY MASS INDEX: 25.99 KG/M2 | HEART RATE: 68 BPM | OXYGEN SATURATION: 94 % | DIASTOLIC BLOOD PRESSURE: 63 MMHG | TEMPERATURE: 97 F

## 2024-08-01 DIAGNOSIS — N18.4 ANEMIA ASSOCIATED WITH STAGE 4 CHRONIC RENAL FAILURE (HCC): Primary | ICD-10-CM

## 2024-08-01 DIAGNOSIS — D63.1 ANEMIA ASSOCIATED WITH STAGE 4 CHRONIC RENAL FAILURE (HCC): Primary | ICD-10-CM

## 2024-08-01 LAB
BASOPHILS # BLD AUTO: 0.04 X10(3) UL (ref 0–0.2)
BASOPHILS NFR BLD AUTO: 0.6 %
DEPRECATED HBV CORE AB SER IA-ACNC: 420 NG/ML
EOSINOPHIL # BLD AUTO: 0.1 X10(3) UL (ref 0–0.7)
EOSINOPHIL NFR BLD AUTO: 1.4 %
ERYTHROCYTE [DISTWIDTH] IN BLOOD BY AUTOMATED COUNT: 17.2 %
HCT VFR BLD AUTO: 29.4 %
HGB BLD-MCNC: 9.1 G/DL
IMM GRANULOCYTES # BLD AUTO: 0.04 X10(3) UL (ref 0–1)
IMM GRANULOCYTES NFR BLD: 0.6 %
IRON SATN MFR SERPL: 23 %
IRON SERPL-MCNC: 61 UG/DL
LYMPHOCYTES # BLD AUTO: 1.02 X10(3) UL (ref 1–4)
LYMPHOCYTES NFR BLD AUTO: 14.1 %
MCH RBC QN AUTO: 20.8 PG (ref 26–34)
MCHC RBC AUTO-ENTMCNC: 31 G/DL (ref 31–37)
MCV RBC AUTO: 67.1 FL
MONOCYTES # BLD AUTO: 0.79 X10(3) UL (ref 0.1–1)
MONOCYTES NFR BLD AUTO: 10.9 %
NEUTROPHILS # BLD AUTO: 5.26 X10 (3) UL (ref 1.5–7.7)
NEUTROPHILS # BLD AUTO: 5.26 X10(3) UL (ref 1.5–7.7)
NEUTROPHILS NFR BLD AUTO: 72.4 %
PLATELET # BLD AUTO: 207 10(3)UL (ref 150–450)
PLATELETS.RETICULATED NFR BLD AUTO: 6.2 % (ref 0–7)
RBC # BLD AUTO: 4.38 X10(6)UL
TOTAL IRON BINDING CAPACITY: 268 UG/DL (ref 250–425)
TRANSFERRIN SERPL-MCNC: 194 MG/DL (ref 215–365)
WBC # BLD AUTO: 7.3 X10(3) UL (ref 4–11)

## 2024-08-01 PROCEDURE — 99214 OFFICE O/P EST MOD 30 MIN: CPT | Performed by: INTERNAL MEDICINE

## 2024-08-01 PROCEDURE — 96372 THER/PROPH/DIAG INJ SC/IM: CPT

## 2024-08-01 NOTE — PROGRESS NOTES
Pt here for follow up and possible injection.    Outpatient Oncology Care Plan  Problem list:  knowledge deficit    Problems related to:    disease/disease progression    Interventions:  provided general teaching    Expected outcomes:  understands plan of care    Progress towards outcome:  making progress    Education Record    Learner:  Patient  Barriers / Limitations:  None  Method:  Brief focused  Outcome:  Shows understanding  Comments:

## 2024-08-01 NOTE — PROGRESS NOTES
Cancer Center Progress Note  Patient Name: Heavenly Perez   YOB: 1935   Medical Record Number: [id number]   CSN: [id number]   Attending Physician: Ananda Romero M.D.       Date of Visit: 8/1/2024      Chief Complaint:  Chief Complaint   Patient presents with    Follow - Up        Oncologic History:  Heavenly Perez is a 88 year old male referred by Dr. Rivers for evaluation of anemia.  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 He was getting it at [facility] before moving his nephrology care to [place]. Starting at [facility], he was transitioned to Aranesp 300mcg monthly. He reports that he has tolerated it well, but he feels that his count has risen too high, and the Aranesp is far more expensive than the CANDE he was getting at [facility] (Retacrit).  He is referred to see if he can get his dosing here, with retacrit.     History of Present Illness:  Pt is here for follow up. He reports that he is tolerating injections well. His energy level is good. No bleeding.     Performance Status:  ECOG 1    Past Medical History:  Past Medical History:    Atherosclerosis of coronary artery    Diabetes (HCC)    Essential hypertension    Hearing impairment    no hearing aids    Heart attack (HCC)    High blood pressure    High cholesterol    History of angioplasty    History of quadruple bypass    Problems with swallowing    takes meds with applesauce    Renal disorder    ckd    Sleep apnea    no device or appliance       Past Surgical History:  Past Surgical History:   Procedure Laterality Date    Cabg         Family History:  No family history on file.    Social History:  Social History     Socioeconomic History    Marital status: Unknown     Spouse name: Not on file    Number of children: Not on file    Years of education: Not on file    Highest education level: Not on file   Occupational History    Not on file   Tobacco Use     Smoking status: Former    Smokeless tobacco: Never   Vaping Use    Vaping status: Never Used   Substance and Sexual Activity    Alcohol use: Yes     Comment: occasional    Drug use: Never    Sexual activity: Not on file   Other Topics Concern    Not on file   Social History Narrative    Not on file     Social Determinants of Health     Financial Resource Strain: Not on file   Food Insecurity: Not on file   Transportation Needs: Not on file   Physical Activity: Not on file   Stress: Not on file   Social Connections: Not on file   Housing Stability: Not on file       Current Medications:    Current Outpatient Medications:     predniSONE 20 MG Oral Tab, Take 40 mgs (2 tabs) daily x 3 days in event of acute gout attack. Please call your doctor as well., Disp: 6 tablet, Rfl: 3    allopurinol 100 MG Oral Tab, Take 0.5 tablets (50 mg total) by mouth daily., Disp: 15 tablet, Rfl: 3    calcitriol 0.25 MCG Oral Cap, Take 1 capsule (0.25 mcg total) by mouth Every Monday, Wednesday, and Friday., Disp: 36 capsule, Rfl: 1    Darbepoetin Orlin (ARANESP, ALBUMIN FREE,) 300 MCG/ML Injection Solution, Inject 300 mcg as directed every 30 (thirty) days., Disp: , Rfl:     acetaminophen 500 MG Oral Tab, Take 2 tablets (1,000 mg total) by mouth 2 (two) times daily as needed for Pain or Fever., Disp: , Rfl:     albuterol 108 (90 Base) MCG/ACT Inhalation Aero Soln, Inhale 1 puff into the lungs as needed for Wheezing., Disp: , Rfl:     amLODIPine 5 MG Oral Tab, Take 1 tablet (5 mg total) by mouth daily., Disp: , Rfl:     ascorbic acid 1000 MG Oral Tab, Take 2 tablets (2,000 mg total) by mouth daily., Disp: , Rfl:     atorvastatin 40 MG Oral Tab, Take 1 tablet (40 mg total) by mouth daily., Disp: , Rfl:     famotidine 40 MG Oral Tab, Take 1 tablet (40 mg total) by mouth daily., Disp: , Rfl:     ferrous sulfate 325 (65 FE) MG Oral Tab EC, Take 1 tablet (325 mg total) by mouth daily., Disp: , Rfl:     hydrALAZINE 100 MG Oral Tab, Take 1 tablet  (100 mg total) by mouth 3 (three) times daily., Disp: , Rfl:     isosorbide mononitrate ER 30 MG Oral Tablet 24 Hr, Take 1 tablet (30 mg total) by mouth daily., Disp: , Rfl:     methIMAzole 5 MG Oral Tab, Take 1 tablet (5 mg total) by mouth daily., Disp: , Rfl:     metoprolol succinate ER 25 MG Oral Tablet 24 Hr, Take 1 tablet (25 mg total) by mouth daily., Disp: , Rfl:     Olopatadine HCl 0.7 % Ophthalmic Solution, Place 1 drop into both ears daily as needed., Disp: , Rfl:     SITagliptin Phosphate 25 MG Oral Tab, Take 1 tablet (25 mg total) by mouth daily., Disp: , Rfl:     tamsulosin 0.4 MG Oral Cap, Take 1 capsule (0.4 mg total) by mouth daily., Disp: , Rfl:     traMADol 50 MG Oral Tab, Take 1 tablet (50 mg total) by mouth every 8 (eight) hours as needed for Pain., Disp: , Rfl:     traZODone 50 MG Oral Tab, Take 1 tablet (50 mg total) by mouth nightly., Disp: , Rfl:     Allergies:  No Known Allergies     Review of Systems:    Constitutional No fevers, chills, night sweats, excessive fatigue or weight loss.   Eyes No significant visual difficulties. No diplopia. No yellowing of the eyes.   Hematologic/Lymphatic No easy bruising or bleeding.  No any tender or palpable lymph nodes.   Respiratory No dyspnea, Pleuritic chest pain, cough or hemoptysis.   Cardiovascular No anginal chest pain, palpitations or orthopnea.   Gastrointestinal No nausea, vomiting, diarrhea, GI bleeding, or constipation.   Genitorurinary (M) No hematuria, dysuria, or incontinence. No abnormal bleeding.   Integumentary No rashes or yellowing of the skin   Neurologic No headache, blurred vision, and no areas of focal weakness. Normal gait.   Psychiatric No insomnia, depression, joseph or mood swings.         Vital Signs:  /63 (BP Location: Left arm, Patient Position: Sitting, Cuff Size: adult)   Pulse 68   Temp 97.2 °F (36.2 °C) (Tympanic)   Resp 18   Ht 1.549 m (5' 0.98\")   Wt 62.4 kg (137 lb 9.6 oz)   SpO2 94%   BMI 26.01 kg/m²      Physical Examination:    Constitutional Normal - Mood and affect appropriate. Appears close to chronological age. Well nourished. Well developed.   Eyes Normal - Conjunctivae and sclerae are clear and without icterus. Pupils are reactive and equal.   Hematologic/Lymphatic Normal - No petechiae or purpura.  No tender or palpable lymph nodes in the cervical, supraclavicular, axillary or inguinal area.   Respiratory Normal - Lungs are clear to auscultation, no rhonchi or wheezing.   Cardiovascular Normal - Regular rate and rhythm, no murmurs, gallops or rubs.   Abdomen Normal - Non-tender, non-distended, no masses, ascites or hepatosplenomegaly.    Extremities Normal - No visible deformities, no cyanosis, clubbing or edema.    Integumentary Normal - No rashes, No Jaundice   Neurologic Normal - No sensory or motor deficits, normal cerebellar function, normal gait, cranial nerves intact.   Psychiatric Normal - Alert and oriented times three. Coherent speech. Verbalizes understanding of our discussions today.         Laboratory:  Recent Labs     08/01/24  1102   RBC 4.38   HGB 9.1 L   HCT 29.4 L   MCV 67.1 L   MCH 20.8 L   MCHC 31.0   RDW 17.2   NEPRELIM 5.26   WBC 7.3   .0     Iron, TIBC, and Ferritin are pending.    Radiology:    Pathology:    Impression and Plan:  Anemia of renal insufficiency.Hgb 9.1 today. Will proceed with his injection today. Continue monthly injections for hgb <10. Recheck Iron studies to ensure that his iron stores remain replete while on Retacrit.     Planned Follow Up:  Monthly labs with injection  Q3 Month MD visit with labs    I spent * minutes face to face with the patient.  More than 50% of that time was spent counseling the patient and/or on coordination of care.  The diagnosis, prognosis, and general treatment was explained to the patient and the family.    Electronically Signed by:    Ananda Romero M.D.  [facility]

## 2024-08-29 ENCOUNTER — APPOINTMENT (OUTPATIENT)
Dept: HEMATOLOGY/ONCOLOGY | Age: 89
End: 2024-08-29
Attending: INTERNAL MEDICINE
Payer: MEDICARE

## 2024-08-30 ENCOUNTER — OFFICE VISIT (OUTPATIENT)
Dept: HEMATOLOGY/ONCOLOGY | Age: 89
End: 2024-08-30
Attending: INTERNAL MEDICINE
Payer: MEDICARE

## 2024-08-30 DIAGNOSIS — N18.4 ANEMIA ASSOCIATED WITH STAGE 4 CHRONIC RENAL FAILURE (HCC): Primary | ICD-10-CM

## 2024-08-30 DIAGNOSIS — D63.1 ANEMIA ASSOCIATED WITH STAGE 4 CHRONIC RENAL FAILURE (HCC): Primary | ICD-10-CM

## 2024-08-30 LAB — HGB BLD-MCNC: 9.2 G/DL

## 2024-08-30 PROCEDURE — 96372 THER/PROPH/DIAG INJ SC/IM: CPT

## 2024-08-30 PROCEDURE — 85018 HEMOGLOBIN: CPT

## 2024-08-30 PROCEDURE — 36415 COLL VENOUS BLD VENIPUNCTURE: CPT

## 2024-09-18 ENCOUNTER — MED REC SCAN ONLY (OUTPATIENT)
Dept: NEPHROLOGY | Facility: CLINIC | Age: 89
End: 2024-09-18

## 2024-09-24 ENCOUNTER — OFFICE VISIT (OUTPATIENT)
Dept: NEPHROLOGY | Facility: CLINIC | Age: 89
End: 2024-09-24
Payer: MEDICARE

## 2024-09-24 VITALS — SYSTOLIC BLOOD PRESSURE: 132 MMHG | DIASTOLIC BLOOD PRESSURE: 58 MMHG | BODY MASS INDEX: 26 KG/M2 | WEIGHT: 139.19 LBS

## 2024-09-24 DIAGNOSIS — N18.4 STAGE 4 CHRONIC KIDNEY DISEASE (HCC): ICD-10-CM

## 2024-09-24 PROCEDURE — 99214 OFFICE O/P EST MOD 30 MIN: CPT | Performed by: INTERNAL MEDICINE

## 2024-09-24 RX ORDER — ALLOPURINOL 100 MG/1
100 TABLET ORAL DAILY
Qty: 30 TABLET | Refills: 3 | Status: SHIPPED | OUTPATIENT
Start: 2024-09-24

## 2024-09-24 NOTE — PROGRESS NOTES
Nephrology Clinic Note      REASON:  CKD4         HPI:   Heavenly Perez is a 88 year old male with   Chief Complaint   Patient presents with    Acute Renal Injury    Chronic Kidney Disease     Stage 4    Hypokalemia     Brayden Reich MD    89 y/o pleasant male with hx of HTN, CAD, DM, CKD - previously / [facility] who presents for follow up   He is doing well  Denies any cp/sob  No n/v  NO edema        Has declined access planning- would prefer to get a HD cath when needs dialysis             ROS:  See above; 12 systems reviewed and otherwise unremarkable     PMH:  Past Medical History:    Atherosclerosis of coronary artery    Diabetes (HCC)    Essential hypertension    Hearing impairment    no hearing aids    Heart attack (HCC)    High blood pressure    High cholesterol    History of angioplasty    History of quadruple bypass    Problems with swallowing    takes meds with applesauce    Renal disorder    ckd    Sleep apnea    no device or appliance         PSH:  Past Surgical History:   Procedure Laterality Date    Cabg           Medications (Active prior to today's visit):  Current Outpatient Medications   Medication Sig Dispense Refill    predniSONE 20 MG Oral Tab Take 40 mgs (2 tabs) daily x 3 days in event of acute gout attack. Please call your doctor as well. 6 tablet 3    allopurinol 100 MG Oral Tab Take 0.5 tablets (50 mg total) by mouth daily. 15 tablet 3    calcitriol 0.25 MCG Oral Cap Take 1 capsule (0.25 mcg total) by mouth Every Monday, Wednesday, and Friday. 36 capsule 1    Darbepoetin Orlin (ARANESP, ALBUMIN FREE,) 300 MCG/ML Injection Solution Inject 300 mcg as directed every 30 (thirty) days.      acetaminophen 500 MG Oral Tab Take 2 tablets (1,000 mg total) by mouth 2 (two) times daily as needed for Pain or Fever.      albuterol 108 (90 Base) MCG/ACT Inhalation Aero Soln Inhale 1 puff into the lungs as needed for Wheezing.      amLODIPine 5 MG Oral Tab Take 1 tablet (5 mg total) by  mouth daily.      ascorbic acid 1000 MG Oral Tab Take 2 tablets (2,000 mg total) by mouth daily.      atorvastatin 40 MG Oral Tab Take 1 tablet (40 mg total) by mouth daily.      famotidine 40 MG Oral Tab Take 1 tablet (40 mg total) by mouth daily.      ferrous sulfate 325 (65 FE) MG Oral Tab EC Take 1 tablet (325 mg total) by mouth daily.      hydrALAZINE 100 MG Oral Tab Take 1 tablet (100 mg total) by mouth 3 (three) times daily.      isosorbide mononitrate ER 30 MG Oral Tablet 24 Hr Take 1 tablet (30 mg total) by mouth daily.      methIMAzole 5 MG Oral Tab Take 1 tablet (5 mg total) by mouth daily.      metoprolol succinate ER 25 MG Oral Tablet 24 Hr Take 1 tablet (25 mg total) by mouth daily.      Olopatadine HCl 0.7 % Ophthalmic Solution Place 1 drop into both ears daily as needed.      SITagliptin Phosphate 25 MG Oral Tab Take 1 tablet (25 mg total) by mouth daily.      tamsulosin 0.4 MG Oral Cap Take 1 capsule (0.4 mg total) by mouth daily.      traMADol 50 MG Oral Tab Take 1 tablet (50 mg total) by mouth every 8 (eight) hours as needed for Pain.      traZODone 50 MG Oral Tab Take 1 tablet (50 mg total) by mouth nightly.           Allergies:  No Known Allergies    Social History:  Social History     Socioeconomic History    Marital status: Unknown   Tobacco Use    Smoking status: Former    Smokeless tobacco: Never   Vaping Use    Vaping status: Never Used   Substance and Sexual Activity    Alcohol use: Yes     Comment: occasional    Drug use: Never          Family History:  No family history on file.         PHYSICAL EXAM:   /58 (BP Location: Right arm)   Wt 139 lb 3.2 oz (63.1 kg)   BMI 26.32 kg/m²    Wt Readings from Last 6 Encounters:   09/24/24 139 lb 3.2 oz (63.1 kg)   08/01/24 137 lb 9.6 oz (62.4 kg)   07/23/24 138 lb 3.2 oz (62.7 kg)   05/14/24 142 lb (64.4 kg)   05/09/24 139 lb (63 kg)   04/11/24 144 lb 6.4 oz (65.5 kg)     General: Alert and oriented in no apparent distress.  HEENT: No  scleral icterus, MMM  Neck: Supple, no ANDRE or thyromegaly  Cardiac: Regular rate and rhythm, MIGUELITO  Lungs: Clear  Abdomen: Soft, non-tender.  Extremities: Without clubbing, cyanosis or edema.  Neurologic:  normal affect, cranial nerves grossly intact, moving all extremities  Skin: Warm and dry, no rashes    Labs reviewed  Urine protein ~ 833  Hgb 9.4  Pth 66  A1c 6.3  Uric acid 7,9  Cr 3.9; gfr ~ 14       ASSESSMENT/PLAN:   There are no diagnoses linked to this encounter.        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 Romero   BMD - as above;   vit D 1000U daily;   calcitriol 0.25 3x/week- cpm  HTN- controlled; meds reviewed; cpm  DM-per primary   CAD- follows w/ Dr. Reddy   HL  Recent gout- increase allopurinol to 100 daily-(risk/benefits/SEs reviewed); will give script for pred if new attack     F/u in 2 mos     Evens Rivers MD  9/24/2024

## 2024-09-26 ENCOUNTER — APPOINTMENT (OUTPATIENT)
Dept: HEMATOLOGY/ONCOLOGY | Age: 89
End: 2024-09-26
Attending: INTERNAL MEDICINE
Payer: MEDICARE

## 2024-09-30 ENCOUNTER — OFFICE VISIT (OUTPATIENT)
Dept: HEMATOLOGY/ONCOLOGY | Age: 89
End: 2024-09-30
Attending: INTERNAL MEDICINE
Payer: MEDICARE

## 2024-09-30 DIAGNOSIS — D63.1 ANEMIA ASSOCIATED WITH STAGE 4 CHRONIC RENAL FAILURE (HCC): Primary | ICD-10-CM

## 2024-09-30 DIAGNOSIS — N18.4 ANEMIA ASSOCIATED WITH STAGE 4 CHRONIC RENAL FAILURE (HCC): Primary | ICD-10-CM

## 2024-09-30 LAB — HGB BLD-MCNC: 8.9 G/DL

## 2024-09-30 PROCEDURE — 36415 COLL VENOUS BLD VENIPUNCTURE: CPT

## 2024-09-30 PROCEDURE — 85018 HEMOGLOBIN: CPT

## 2024-09-30 PROCEDURE — 96372 THER/PROPH/DIAG INJ SC/IM: CPT

## 2024-10-24 ENCOUNTER — OFFICE VISIT (OUTPATIENT)
Dept: HEMATOLOGY/ONCOLOGY | Age: 89
End: 2024-10-24
Attending: INTERNAL MEDICINE
Payer: MEDICARE

## 2024-10-24 VITALS
DIASTOLIC BLOOD PRESSURE: 55 MMHG | OXYGEN SATURATION: 96 % | TEMPERATURE: 97 F | RESPIRATION RATE: 18 BRPM | HEIGHT: 60.98 IN | SYSTOLIC BLOOD PRESSURE: 132 MMHG | WEIGHT: 142.81 LBS | BODY MASS INDEX: 26.96 KG/M2 | HEART RATE: 84 BPM

## 2024-10-24 DIAGNOSIS — N18.4 ANEMIA ASSOCIATED WITH STAGE 4 CHRONIC RENAL FAILURE (HCC): Primary | ICD-10-CM

## 2024-10-24 DIAGNOSIS — D63.1 ANEMIA ASSOCIATED WITH STAGE 4 CHRONIC RENAL FAILURE (HCC): Primary | ICD-10-CM

## 2024-10-24 LAB
BASOPHILS # BLD AUTO: 0.02 X10(3) UL (ref 0–0.2)
BASOPHILS NFR BLD AUTO: 0.2 %
DEPRECATED HBV CORE AB SER IA-ACNC: 325 NG/ML
EOSINOPHIL # BLD AUTO: 0.1 X10(3) UL (ref 0–0.7)
EOSINOPHIL NFR BLD AUTO: 1.2 %
ERYTHROCYTE [DISTWIDTH] IN BLOOD BY AUTOMATED COUNT: 17.2 %
HCT VFR BLD AUTO: 29 %
HGB BLD-MCNC: 9 G/DL
IMM GRANULOCYTES # BLD AUTO: 0.04 X10(3) UL (ref 0–1)
IMM GRANULOCYTES NFR BLD: 0.5 %
IRON SATN MFR SERPL: 25 %
IRON SERPL-MCNC: 61 UG/DL
LYMPHOCYTES # BLD AUTO: 1.19 X10(3) UL (ref 1–4)
LYMPHOCYTES NFR BLD AUTO: 14.8 %
MCH RBC QN AUTO: 21.1 PG (ref 26–34)
MCHC RBC AUTO-ENTMCNC: 31 G/DL (ref 31–37)
MCV RBC AUTO: 68.1 FL
MONOCYTES # BLD AUTO: 0.87 X10(3) UL (ref 0.1–1)
MONOCYTES NFR BLD AUTO: 10.8 %
NEUTROPHILS # BLD AUTO: 5.84 X10 (3) UL (ref 1.5–7.7)
NEUTROPHILS # BLD AUTO: 5.84 X10(3) UL (ref 1.5–7.7)
NEUTROPHILS NFR BLD AUTO: 72.5 %
PLATELET # BLD AUTO: 229 10(3)UL (ref 150–450)
RBC # BLD AUTO: 4.26 X10(6)UL
TOTAL IRON BINDING CAPACITY: 243 UG/DL (ref 250–425)
TRANSFERRIN SERPL-MCNC: 172 MG/DL (ref 215–365)
WBC # BLD AUTO: 8.1 X10(3) UL (ref 4–11)

## 2024-10-24 PROCEDURE — G2211 COMPLEX E/M VISIT ADD ON: HCPCS | Performed by: INTERNAL MEDICINE

## 2024-10-24 PROCEDURE — 96372 THER/PROPH/DIAG INJ SC/IM: CPT

## 2024-10-24 PROCEDURE — 99213 OFFICE O/P EST LOW 20 MIN: CPT | Performed by: INTERNAL MEDICINE

## 2024-10-24 NOTE — PROGRESS NOTES
Pt here for follow up and possible injection.   He complains of pain in his lower pain.      Outpatient Oncology Care Plan  Problem list:  knowledge deficit    Problems related to:    disease/disease progression    Interventions:  provided general teaching    Expected outcomes:  understands plan of care    Progress towards outcome:  making progress    Education Record    Learner:  Patient  Barriers / Limitations:  None  Method:  Brief focused  Outcome:  Shows understanding  Comments:

## 2024-10-24 NOTE — PROGRESS NOTES
Education Record    Learner:  Patient    Pt here for: Retacrit injection    Barriers / Limitations:  None    Method:  Brief focused, printed material and  reinforcement    General Topics:  Plan of care reviewed    Outcome: Pt tolerated injection with no c/o.

## 2024-10-24 NOTE — PROGRESS NOTES
Cancer Center Progress Note  Patient Name: Heavenly Perez   YOB: 1935   Medical Record Number: [id number]   CSN: [id number]   Attending Physician: Ananda Romero M.D.       Date of Visit: 10/24/2024      Chief Complaint:  Chief Complaint   Patient presents with    Follow - Up        Oncologic History:  Heavenly Perez is a 88 year old male referred by Dr. Rivers for evaluation of anemia.  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 He was getting it at [facility] before moving his nephrology care to [place]. Starting at [facility], he was transitioned to Aranesp 300mcg monthly. He reports that he has tolerated it well, but he feels that his count has risen too high, and the Aranesp is far more expensive than the CANDE he was getting at [facility] (Retacrit).  He is referred to see if he can get his dosing here, with retacrit.     History of Present Illness:  Pt is here for follow up. He complains of mild fatigue.  No bleeding.     Performance Status:  ECOG 1    Past Medical History:  Past Medical History:    Atherosclerosis of coronary artery    Diabetes (HCC)    Essential hypertension    Hearing impairment    no hearing aids    Heart attack (HCC)    High blood pressure    High cholesterol    History of angioplasty    History of quadruple bypass    Problems with swallowing    takes meds with applesauce    Renal disorder    ckd    Sleep apnea    no device or appliance       Past Surgical History:  Past Surgical History:   Procedure Laterality Date    Cabg         Family History:  No family history on file.    Social History:  Social History     Socioeconomic History    Marital status: Unknown     Spouse name: Not on file    Number of children: Not on file    Years of education: Not on file    Highest education level: Not on file   Occupational History    Not on file   Tobacco Use    Smoking status: Former    Smokeless tobacco:  Never   Vaping Use    Vaping status: Never Used   Substance and Sexual Activity    Alcohol use: Yes     Comment: occasional    Drug use: Never    Sexual activity: Not on file   Other Topics Concern    Not on file   Social History Narrative    Not on file     Social Drivers of Health     Financial Resource Strain: Not on file   Food Insecurity: Not on file   Transportation Needs: Not on file   Physical Activity: Not on file   Stress: Not on file   Social Connections: Not on file   Housing Stability: Not on file       Current Medications:    Current Outpatient Medications:     allopurinol 100 MG Oral Tab, Take 1 tablet (100 mg total) by mouth daily., Disp: 30 tablet, Rfl: 3    predniSONE 20 MG Oral Tab, Take 40 mgs (2 tabs) daily x 3 days in event of acute gout attack. Please call your doctor as well., Disp: 6 tablet, Rfl: 3    calcitriol 0.25 MCG Oral Cap, Take 1 capsule (0.25 mcg total) by mouth Every Monday, Wednesday, and Friday., Disp: 36 capsule, Rfl: 1    Darbepoetin Orlin (ARANESP, ALBUMIN FREE,) 300 MCG/ML Injection Solution, Inject 300 mcg as directed every 30 (thirty) days., Disp: , Rfl:     acetaminophen 500 MG Oral Tab, Take 2 tablets (1,000 mg total) by mouth 2 (two) times daily as needed for Pain or Fever., Disp: , Rfl:     albuterol 108 (90 Base) MCG/ACT Inhalation Aero Soln, Inhale 1 puff into the lungs as needed for Wheezing., Disp: , Rfl:     amLODIPine 5 MG Oral Tab, Take 1 tablet (5 mg total) by mouth daily., Disp: , Rfl:     ascorbic acid 1000 MG Oral Tab, Take 2 tablets (2,000 mg total) by mouth daily., Disp: , Rfl:     atorvastatin 40 MG Oral Tab, Take 1 tablet (40 mg total) by mouth daily., Disp: , Rfl:     famotidine 40 MG Oral Tab, Take 1 tablet (40 mg total) by mouth daily., Disp: , Rfl:     ferrous sulfate 325 (65 FE) MG Oral Tab EC, Take 1 tablet (325 mg total) by mouth daily., Disp: , Rfl:     hydrALAZINE 100 MG Oral Tab, Take 1 tablet (100 mg total) by mouth 3 (three) times daily., Disp:  , Rfl:     isosorbide mononitrate ER 30 MG Oral Tablet 24 Hr, Take 1 tablet (30 mg total) by mouth daily., Disp: , Rfl:     methIMAzole 5 MG Oral Tab, Take 1 tablet (5 mg total) by mouth daily., Disp: , Rfl:     metoprolol succinate ER 25 MG Oral Tablet 24 Hr, Take 1 tablet (25 mg total) by mouth daily., Disp: , Rfl:     Olopatadine HCl 0.7 % Ophthalmic Solution, Place 1 drop into both ears daily as needed., Disp: , Rfl:     SITagliptin Phosphate 25 MG Oral Tab, Take 1 tablet (25 mg total) by mouth daily., Disp: , Rfl:     tamsulosin 0.4 MG Oral Cap, Take 1 capsule (0.4 mg total) by mouth daily., Disp: , Rfl:     traMADol 50 MG Oral Tab, Take 1 tablet (50 mg total) by mouth every 8 (eight) hours as needed for Pain., Disp: , Rfl:     traZODone 50 MG Oral Tab, Take 1 tablet (50 mg total) by mouth nightly., Disp: , Rfl:     Allergies:  No Known Allergies     Review of Systems:    Constitutional No fevers, chills, night sweats, excessive fatigue or weight loss.   Eyes No significant visual difficulties. No diplopia. No yellowing of the eyes.   Hematologic/Lymphatic No easy bruising or bleeding.  No any tender or palpable lymph nodes.   Respiratory No dyspnea, Pleuritic chest pain, cough or hemoptysis.   Cardiovascular No anginal chest pain, palpitations or orthopnea.   Gastrointestinal No nausea, vomiting, diarrhea, GI bleeding, or constipation.   Genitorurinary (M) No hematuria, dysuria, or incontinence. No abnormal bleeding.   Integumentary No rashes or yellowing of the skin   Neurologic No headache, blurred vision, and no areas of focal weakness. Normal gait.   Psychiatric No insomnia, depression, joseph or mood swings.         Vital Signs:  /55 (BP Location: Left arm, Patient Position: Sitting, Cuff Size: adult)   Pulse 84   Temp 96.7 °F (35.9 °C) (Tympanic)   Resp 18   Ht 1.549 m (5' 0.98\")   Wt 64.8 kg (142 lb 12.8 oz)   SpO2 96%   BMI 27.00 kg/m²     Physical Examination:    Constitutional Normal -  Mood and affect appropriate. Appears close to chronological age. Well nourished. Well developed.   Eyes Normal - Conjunctivae and sclerae are clear and without icterus. Pupils are reactive and equal.   Hematologic/Lymphatic Normal - No petechiae or purpura.  No tender or palpable lymph nodes in the cervical, supraclavicular, axillary or inguinal area.   Respiratory Normal - Lungs are clear to auscultation, no rhonchi or wheezing.   Cardiovascular Normal - Regular rate and rhythm, no murmurs, gallops or rubs.   Abdomen Normal - Non-tender, non-distended, no masses, ascites or hepatosplenomegaly.    Extremities Normal - No visible deformities, no cyanosis, clubbing or edema.    Integumentary Normal - No rashes, No Jaundice   Neurologic Normal - No sensory or motor deficits, normal cerebellar function, normal gait, cranial nerves intact.   Psychiatric Normal - Alert and oriented times three. Coherent speech. Verbalizes understanding of our discussions today.         Laboratory:  Recent Labs     10/24/24  1105   RBC 4.26   HGB 9.0 L   HCT 29.0 L   MCV 68.1 L   MCH 21.1 L   MCHC 31.0   RDW 17.2   NEPRELIM 5.84   WBC 8.1   .0       Iron, TIBC, and Ferritin are pending.    Radiology:    Pathology:    Impression and Plan:  Anemia of renal insufficiency.Hgb 9 today. Will proceed with his injection today. Continue monthly injections for hgb <10. Recheck Iron studies to ensure that his iron stores remain replete while on Retacrit.     Planned Follow Up:  Monthly labs with injection  Q3 Month MD visit with labs    I spent * minutes face to face with the patient.  More than 50% of that time was spent counseling the patient and/or on coordination of care.  The diagnosis, prognosis, and general treatment was explained to the patient and the family.    Electronically Signed by:    Ananda Romero M.D.  [facility]

## 2024-11-18 ENCOUNTER — OFFICE VISIT (OUTPATIENT)
Dept: HEMATOLOGY/ONCOLOGY | Age: 89
End: 2024-11-18
Attending: INTERNAL MEDICINE
Payer: MEDICARE

## 2024-11-18 DIAGNOSIS — D63.1 ANEMIA ASSOCIATED WITH STAGE 4 CHRONIC RENAL FAILURE (HCC): Primary | ICD-10-CM

## 2024-11-18 DIAGNOSIS — N18.4 ANEMIA ASSOCIATED WITH STAGE 4 CHRONIC RENAL FAILURE (HCC): Primary | ICD-10-CM

## 2024-11-18 LAB — HGB BLD-MCNC: 9.2 G/DL

## 2024-11-18 PROCEDURE — 96372 THER/PROPH/DIAG INJ SC/IM: CPT

## 2024-11-18 PROCEDURE — 85018 HEMOGLOBIN: CPT

## 2024-11-18 PROCEDURE — 36415 COLL VENOUS BLD VENIPUNCTURE: CPT

## 2024-11-18 NOTE — PROGRESS NOTES
Patient  here for retacrit inj. Hgb 9.2, inj administered per order. Patient  dc'd home in stable condition.

## 2024-12-02 ENCOUNTER — LAB ENCOUNTER (OUTPATIENT)
Dept: LAB | Age: 89
End: 2024-12-02
Attending: INTERNAL MEDICINE
Payer: MEDICARE

## 2024-12-02 DIAGNOSIS — N18.4 STAGE 4 CHRONIC KIDNEY DISEASE (HCC): ICD-10-CM

## 2024-12-02 DIAGNOSIS — N18.4 CKD (CHRONIC KIDNEY DISEASE) STAGE 4, GFR 15-29 ML/MIN (HCC): ICD-10-CM

## 2024-12-02 LAB
ANION GAP SERPL CALC-SCNC: 12 MMOL/L (ref 0–18)
BASOPHILS # BLD AUTO: 0.02 X10(3) UL (ref 0–0.2)
BASOPHILS NFR BLD AUTO: 0.2 %
BUN BLD-MCNC: 94 MG/DL (ref 9–23)
CALCIUM BLD-MCNC: 9.2 MG/DL (ref 8.7–10.4)
CHLORIDE SERPL-SCNC: 113 MMOL/L (ref 98–112)
CO2 SERPL-SCNC: 17 MMOL/L (ref 21–32)
CREAT BLD-MCNC: 3.81 MG/DL
EGFRCR SERPLBLD CKD-EPI 2021: 15 ML/MIN/1.73M2 (ref 60–?)
EOSINOPHIL # BLD AUTO: 0.09 X10(3) UL (ref 0–0.7)
EOSINOPHIL NFR BLD AUTO: 0.9 %
ERYTHROCYTE [DISTWIDTH] IN BLOOD BY AUTOMATED COUNT: 17.9 %
FASTING STATUS PATIENT QL REPORTED: NO
GLUCOSE BLD-MCNC: 77 MG/DL (ref 70–99)
HCT VFR BLD AUTO: 29.6 %
HGB BLD-MCNC: 9.1 G/DL
IMM GRANULOCYTES # BLD AUTO: 0.03 X10(3) UL (ref 0–1)
IMM GRANULOCYTES NFR BLD: 0.3 %
LYMPHOCYTES # BLD AUTO: 1.07 X10(3) UL (ref 1–4)
LYMPHOCYTES NFR BLD AUTO: 11.2 %
MAGNESIUM SERPL-MCNC: 2.1 MG/DL (ref 1.6–2.6)
MCH RBC QN AUTO: 20.8 PG (ref 26–34)
MCHC RBC AUTO-ENTMCNC: 30.7 G/DL (ref 31–37)
MCV RBC AUTO: 67.6 FL
MONOCYTES # BLD AUTO: 0.89 X10(3) UL (ref 0.1–1)
MONOCYTES NFR BLD AUTO: 9.3 %
NEUTROPHILS # BLD AUTO: 7.42 X10 (3) UL (ref 1.5–7.7)
NEUTROPHILS # BLD AUTO: 7.42 X10(3) UL (ref 1.5–7.7)
NEUTROPHILS NFR BLD AUTO: 78.1 %
OSMOLALITY SERPL CALC.SUM OF ELEC: 322 MOSM/KG (ref 275–295)
PHOSPHATE SERPL-MCNC: 5.5 MG/DL (ref 2.4–5.1)
PLATELET # BLD AUTO: 190 10(3)UL (ref 150–450)
PLATELETS.RETICULATED NFR BLD AUTO: 5 % (ref 0–7)
POTASSIUM SERPL-SCNC: 4.4 MMOL/L (ref 3.5–5.1)
RBC # BLD AUTO: 4.38 X10(6)UL
SODIUM SERPL-SCNC: 142 MMOL/L (ref 136–145)
URATE SERPL-MCNC: 5.5 MG/DL
WBC # BLD AUTO: 9.5 X10(3) UL (ref 4–11)

## 2024-12-02 PROCEDURE — 80048 BASIC METABOLIC PNL TOTAL CA: CPT

## 2024-12-02 PROCEDURE — 85025 COMPLETE CBC W/AUTO DIFF WBC: CPT

## 2024-12-02 PROCEDURE — 36415 COLL VENOUS BLD VENIPUNCTURE: CPT

## 2024-12-02 PROCEDURE — 84550 ASSAY OF BLOOD/URIC ACID: CPT

## 2024-12-02 PROCEDURE — 83735 ASSAY OF MAGNESIUM: CPT

## 2024-12-02 PROCEDURE — 84100 ASSAY OF PHOSPHORUS: CPT

## 2024-12-03 ENCOUNTER — OFFICE VISIT (OUTPATIENT)
Dept: NEPHROLOGY | Facility: CLINIC | Age: 89
End: 2024-12-03
Payer: MEDICARE

## 2024-12-03 VITALS — SYSTOLIC BLOOD PRESSURE: 127 MMHG | HEART RATE: 71 BPM | DIASTOLIC BLOOD PRESSURE: 62 MMHG

## 2024-12-03 DIAGNOSIS — N18.4 CKD (CHRONIC KIDNEY DISEASE) STAGE 4, GFR 15-29 ML/MIN (HCC): Primary | ICD-10-CM

## 2024-12-03 PROCEDURE — 99214 OFFICE O/P EST MOD 30 MIN: CPT | Performed by: INTERNAL MEDICINE

## 2024-12-03 RX ORDER — TORSEMIDE 20 MG/1
20 TABLET ORAL DAILY PRN
Qty: 30 TABLET | Refills: 1 | Status: SHIPPED | OUTPATIENT
Start: 2024-12-03

## 2024-12-03 RX ORDER — DOCUSATE SODIUM 100 MG/1
100 CAPSULE, LIQUID FILLED ORAL 2 TIMES DAILY PRN
Qty: 60 CAPSULE | Refills: 1 | Status: SHIPPED | OUTPATIENT
Start: 2024-12-03

## 2024-12-03 RX ORDER — SODIUM BICARBONATE 650 MG/1
650 TABLET ORAL 2 TIMES DAILY
Qty: 60 TABLET | Refills: 1 | Status: SHIPPED | OUTPATIENT
Start: 2024-12-03

## 2024-12-03 NOTE — PROGRESS NOTES
Nephrology Clinic Note      REASON:  CKD4         HPI:   Heavenly Perez is a 89 year old male with   Chief Complaint   Patient presents with    Acute Renal Injury    Renal Cyst    Hypokalemia     Brayden Reich MD    88 y/o pleasant male with hx of HTN, CAD, DM, CKD 4/5 presents for follow up   He is doing well  Denies any cp/sob  No n/v  NO edema        Has declined access planning- would prefer to get a HD cath when needs dialysis             ROS:  See above; 12 systems reviewed and otherwise unremarkable     PMH:  Past Medical History:    Atherosclerosis of coronary artery    Diabetes (HCC)    Essential hypertension    Hearing impairment    no hearing aids    Heart attack (HCC)    High blood pressure    High cholesterol    History of angioplasty    History of quadruple bypass    Problems with swallowing    takes meds with applesauce    Renal disorder    ckd    Sleep apnea    no device or appliance         PSH:  Past Surgical History:   Procedure Laterality Date    Cabg           Medications (Active prior to today's visit):  Current Outpatient Medications   Medication Sig Dispense Refill    allopurinol 100 MG Oral Tab Take 1 tablet (100 mg total) by mouth daily. 30 tablet 3    predniSONE 20 MG Oral Tab Take 40 mgs (2 tabs) daily x 3 days in event of acute gout attack. Please call your doctor as well. (Patient not taking: Reported on 10/24/2024) 6 tablet 3    calcitriol 0.25 MCG Oral Cap Take 1 capsule (0.25 mcg total) by mouth Every Monday, Wednesday, and Friday. 36 capsule 1    Darbepoetin Orlin (ARANESP, ALBUMIN FREE,) 300 MCG/ML Injection Solution Inject 300 mcg as directed every 30 (thirty) days.      acetaminophen 500 MG Oral Tab Take 2 tablets (1,000 mg total) by mouth 2 (two) times daily as needed for Pain or Fever.      albuterol 108 (90 Base) MCG/ACT Inhalation Aero Soln Inhale 1 puff into the lungs as needed for Wheezing.      amLODIPine 5 MG Oral Tab Take 1 tablet (5 mg total) by mouth daily.       ascorbic acid 1000 MG Oral Tab Take 2 tablets (2,000 mg total) by mouth daily.      atorvastatin 40 MG Oral Tab Take 1 tablet (40 mg total) by mouth daily.      famotidine 40 MG Oral Tab Take 1 tablet (40 mg total) by mouth daily.      ferrous sulfate 325 (65 FE) MG Oral Tab EC Take 1 tablet (325 mg total) by mouth daily.      hydrALAZINE 100 MG Oral Tab Take 1 tablet (100 mg total) by mouth 3 (three) times daily.      isosorbide mononitrate ER 30 MG Oral Tablet 24 Hr Take 1 tablet (30 mg total) by mouth daily.      methIMAzole 5 MG Oral Tab Take 1 tablet (5 mg total) by mouth daily.      metoprolol succinate ER 25 MG Oral Tablet 24 Hr Take 1 tablet (25 mg total) by mouth daily.      Olopatadine HCl 0.7 % Ophthalmic Solution Place 1 drop into both ears daily as needed.      SITagliptin Phosphate 25 MG Oral Tab Take 1 tablet (25 mg total) by mouth daily.      tamsulosin 0.4 MG Oral Cap Take 1 capsule (0.4 mg total) by mouth daily.      traMADol 50 MG Oral Tab Take 1 tablet (50 mg total) by mouth every 8 (eight) hours as needed for Pain.      traZODone 50 MG Oral Tab Take 1 tablet (50 mg total) by mouth nightly.           Allergies:  No Known Allergies    Social History:  Social History     Socioeconomic History    Marital status: Unknown   Tobacco Use    Smoking status: Former    Smokeless tobacco: Never   Vaping Use    Vaping status: Never Used   Substance and Sexual Activity    Alcohol use: Yes     Comment: occasional    Drug use: Never          Family History:  No family history on file.         PHYSICAL EXAM:   /62 (BP Location: Right arm, Patient Position: Sitting, Cuff Size: adult)   Pulse 71    Wt Readings from Last 6 Encounters:   10/24/24 142 lb 12.8 oz (64.8 kg)   09/24/24 139 lb 3.2 oz (63.1 kg)   08/01/24 137 lb 9.6 oz (62.4 kg)   07/23/24 138 lb 3.2 oz (62.7 kg)   05/14/24 142 lb (64.4 kg)   05/09/24 139 lb (63 kg)     General: Alert and oriented in no apparent distress.  HEENT: No scleral  icterus, MMM  Neck: Supple, no ANDRE or thyromegaly  Cardiac: Regular rate and rhythm, MIGUELITO  Lungs: Clear  Abdomen: Soft, non-tender.  Extremities: Without clubbing, cyanosis or edema.  Neurologic:  normal affect, cranial nerves grossly intact, moving all extremities  Skin: Warm and dry, no rashes    Labs reviewed          ASSESSMENT/PLAN:   There are no diagnoses linked to this encounter.        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 Romero   BMD - as above;   vit D 1000U daily;   calcitriol 0.25 3x/week- cpm  HTN- controlled; meds reviewed; cpm  DM-per primary   CAD- follows w/ Dr. Maureen HENSON  Recent gout- increase allopurinol to 100 daily-(risk/benefits/SEs reviewed); will give script for pred if new attack   Acidosis- realted to CKD: start on oral bicarb  Constipation; on miralax; will give prn colace     F/u in 2 mos     Evens Rivers MD  12/3/2024

## 2024-12-19 ENCOUNTER — OFFICE VISIT (OUTPATIENT)
Age: 89
End: 2024-12-19
Attending: INTERNAL MEDICINE
Payer: MEDICARE

## 2024-12-19 DIAGNOSIS — D63.1 ANEMIA ASSOCIATED WITH STAGE 4 CHRONIC RENAL FAILURE (HCC): Primary | ICD-10-CM

## 2024-12-19 DIAGNOSIS — N18.4 ANEMIA ASSOCIATED WITH STAGE 4 CHRONIC RENAL FAILURE (HCC): Primary | ICD-10-CM

## 2024-12-19 LAB — HGB BLD-MCNC: 9 G/DL

## 2024-12-24 DIAGNOSIS — N18.4 STAGE 4 CHRONIC KIDNEY DISEASE (HCC): ICD-10-CM

## 2024-12-26 RX ORDER — CALCITRIOL 0.25 UG/1
0.25 CAPSULE, LIQUID FILLED ORAL
Qty: 36 CAPSULE | Refills: 1 | Status: SHIPPED | OUTPATIENT
Start: 2024-12-27

## 2025-01-13 DIAGNOSIS — N18.4 STAGE 4 CHRONIC KIDNEY DISEASE (HCC): ICD-10-CM

## 2025-01-13 RX ORDER — ALLOPURINOL 100 MG/1
100 TABLET ORAL DAILY
Qty: 90 TABLET | Refills: 1 | Status: SHIPPED | OUTPATIENT
Start: 2025-01-13

## 2025-01-16 ENCOUNTER — OFFICE VISIT (OUTPATIENT)
Age: 89
End: 2025-01-16
Attending: INTERNAL MEDICINE
Payer: MEDICARE

## 2025-01-16 VITALS
HEART RATE: 82 BPM | RESPIRATION RATE: 18 BRPM | DIASTOLIC BLOOD PRESSURE: 56 MMHG | TEMPERATURE: 97 F | OXYGEN SATURATION: 95 % | WEIGHT: 138.81 LBS | SYSTOLIC BLOOD PRESSURE: 134 MMHG | HEIGHT: 60.98 IN | BODY MASS INDEX: 26.21 KG/M2

## 2025-01-16 DIAGNOSIS — N18.4 ANEMIA ASSOCIATED WITH STAGE 4 CHRONIC RENAL FAILURE (HCC): Primary | ICD-10-CM

## 2025-01-16 DIAGNOSIS — D63.1 ANEMIA ASSOCIATED WITH STAGE 4 CHRONIC RENAL FAILURE (HCC): Primary | ICD-10-CM

## 2025-01-16 LAB — HGB BLD-MCNC: 9 G/DL

## 2025-01-16 NOTE — PROGRESS NOTES
Education Record    Learner:  Patient    Pt here for: Labs and Procrit injection    Barriers / Limitations:  None    Method:  Brief focused discussion and  reinforcement    General Topics:  Plan of care reviewed    Outcome: Pt arrived ambulating independently accompanied by self. Pt tolerated injection with no c/o. Pt expressed frustration that Hgb numbers are not improving on current treatment. Pt would like to see doctor sooner than currently scheduled appt for April - message sent to clinic RN to contact pt. Pt discharged home in stable condition.

## 2025-01-22 ENCOUNTER — OFFICE VISIT (OUTPATIENT)
Age: 89
End: 2025-01-22
Attending: INTERNAL MEDICINE
Payer: MEDICARE

## 2025-01-22 VITALS
SYSTOLIC BLOOD PRESSURE: 147 MMHG | HEART RATE: 94 BPM | BODY MASS INDEX: 26.46 KG/M2 | DIASTOLIC BLOOD PRESSURE: 72 MMHG | WEIGHT: 140.13 LBS | TEMPERATURE: 96 F | OXYGEN SATURATION: 95 % | HEIGHT: 60.98 IN | RESPIRATION RATE: 18 BRPM

## 2025-01-22 DIAGNOSIS — N18.4 ANEMIA ASSOCIATED WITH STAGE 4 CHRONIC RENAL FAILURE (HCC): Primary | ICD-10-CM

## 2025-01-22 DIAGNOSIS — D63.1 ANEMIA ASSOCIATED WITH STAGE 4 CHRONIC RENAL FAILURE (HCC): Primary | ICD-10-CM

## 2025-01-22 LAB
BASOPHILS # BLD AUTO: 0.03 X10(3) UL (ref 0–0.2)
BASOPHILS NFR BLD AUTO: 0.3 %
DEPRECATED HBV CORE AB SER IA-ACNC: 279 NG/ML
EOSINOPHIL # BLD AUTO: 0.11 X10(3) UL (ref 0–0.7)
EOSINOPHIL NFR BLD AUTO: 1 %
ERYTHROCYTE [DISTWIDTH] IN BLOOD BY AUTOMATED COUNT: 17.5 %
HCT VFR BLD AUTO: 29.5 %
HGB BLD-MCNC: 9.3 G/DL
IMM GRANULOCYTES # BLD AUTO: 0.06 X10(3) UL (ref 0–1)
IMM GRANULOCYTES NFR BLD: 0.6 %
IRON SATN MFR SERPL: 12 %
IRON SERPL-MCNC: 29 UG/DL
LYMPHOCYTES # BLD AUTO: 1.01 X10(3) UL (ref 1–4)
LYMPHOCYTES NFR BLD AUTO: 9.5 %
MCH RBC QN AUTO: 21.1 PG (ref 26–34)
MCHC RBC AUTO-ENTMCNC: 31.5 G/DL (ref 31–37)
MCV RBC AUTO: 67 FL
MONOCYTES # BLD AUTO: 0.98 X10(3) UL (ref 0.1–1)
MONOCYTES NFR BLD AUTO: 9.2 %
NEUTROPHILS # BLD AUTO: 8.44 X10 (3) UL (ref 1.5–7.7)
NEUTROPHILS # BLD AUTO: 8.44 X10(3) UL (ref 1.5–7.7)
NEUTROPHILS NFR BLD AUTO: 79.4 %
PLATELET # BLD AUTO: 261 10(3)UL (ref 150–450)
PLATELET MORPHOLOGY: NORMAL
RBC # BLD AUTO: 4.4 X10(6)UL
TOTAL IRON BINDING CAPACITY: 242 UG/DL (ref 250–425)
TRANSFERRIN SERPL-MCNC: 179 MG/DL (ref 215–365)
WBC # BLD AUTO: 10.6 X10(3) UL (ref 4–11)

## 2025-01-22 NOTE — PROGRESS NOTES
Cancer Center Progress Note  Patient Name: Heavenly Perez   YOB: 1935   Medical Record Number: [id number]   CSN: [id number]   Attending Physician: Ananda Romero M.D.       Date of Visit: 1/22/2025      Chief Complaint:  No chief complaint on file.       Oncologic History:  Heavenly Perez is a 89 year old male referred by Dr. Rivers for evaluation of anemia.  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 He was getting it at [facility] before moving his nephrology care to [facility]. Starting at [facility], he was transitioned to Aranesp 300mcg monthly. He reports that he has tolerated it well, but he feels that his count has risen too high, and the Aranesp is far more expensive than the CANDE he was getting at [facility] (Retacrit).  He is referred to see if he can get his dosing here, with retacrit.     History of Present Illness:  Pt is here for follow up. He complains of mild fatigue.  No bleeding. He'd like to try to push his hgb to closer to 10.    Performance Status:  ECOG 1    Past Medical History:  Past Medical History:    Atherosclerosis of coronary artery    Diabetes (HCC)    Essential hypertension    Hearing impairment    no hearing aids    Heart attack (HCC)    High blood pressure    High cholesterol    History of angioplasty    History of quadruple bypass    Problems with swallowing    takes meds with applesauce    Renal disorder    ckd    Sleep apnea    no device or appliance       Past Surgical History:  Past Surgical History:   Procedure Laterality Date    Cabg         Family History:  No family history on file.    Social History:  Social History     Socioeconomic History    Marital status: Unknown     Spouse name: Not on file    Number of children: Not on file    Years of education: Not on file    Highest education level: Not on file   Occupational History    Not on file   Tobacco Use    Smoking status: Former     Smokeless tobacco: Never   Vaping Use    Vaping status: Never Used   Substance and Sexual Activity    Alcohol use: Yes     Comment: occasional    Drug use: Never    Sexual activity: Not on file   Other Topics Concern    Not on file   Social History Narrative    Not on file     Social Drivers of Health     Financial Resource Strain: Not on file   Food Insecurity: Not on file   Transportation Needs: Not on file   Physical Activity: Not on file   Stress: Not on file   Social Connections: Not on file   Housing Stability: Not on file       Current Medications:    Current Outpatient Medications:     allopurinol 100 MG Oral Tab, Take 1 tablet (100 mg total) by mouth daily., Disp: 90 tablet, Rfl: 1    calcitriol 0.25 MCG Oral Cap, Take 1 capsule (0.25 mcg total) by mouth Every Monday, Wednesday, and Friday., Disp: 36 capsule, Rfl: 1    docusate sodium 100 MG Oral Cap, Take 1 capsule (100 mg total) by mouth 2 (two) times daily as needed for constipation., Disp: 60 capsule, Rfl: 1    torsemide 20 MG Oral Tab, Take 1 tablet (20 mg total) by mouth daily as needed (for weight >138 lbs)., Disp: 30 tablet, Rfl: 1    sodium bicarbonate 650 MG Oral Tab, Take 1 tablet (650 mg total) by mouth 2 (two) times daily., Disp: 60 tablet, Rfl: 1    predniSONE 20 MG Oral Tab, Take 40 mgs (2 tabs) daily x 3 days in event of acute gout attack. Please call your doctor as well. (Patient not taking: Reported on 10/24/2024), Disp: 6 tablet, Rfl: 3    Darbepoetin Orlin (ARANESP, ALBUMIN FREE,) 300 MCG/ML Injection Solution, Inject 300 mcg as directed every 30 (thirty) days., Disp: , Rfl:     acetaminophen 500 MG Oral Tab, Take 2 tablets (1,000 mg total) by mouth 2 (two) times daily as needed for Pain or Fever., Disp: , Rfl:     albuterol 108 (90 Base) MCG/ACT Inhalation Aero Soln, Inhale 1 puff into the lungs as needed for Wheezing., Disp: , Rfl:     amLODIPine 5 MG Oral Tab, Take 1 tablet (5 mg total) by mouth daily., Disp: , Rfl:     ascorbic acid  1000 MG Oral Tab, Take 2 tablets (2,000 mg total) by mouth daily., Disp: , Rfl:     atorvastatin 40 MG Oral Tab, Take 1 tablet (40 mg total) by mouth daily., Disp: , Rfl:     famotidine 40 MG Oral Tab, Take 1 tablet (40 mg total) by mouth daily., Disp: , Rfl:     ferrous sulfate 325 (65 FE) MG Oral Tab EC, Take 1 tablet (325 mg total) by mouth daily., Disp: , Rfl:     hydrALAZINE 100 MG Oral Tab, Take 1 tablet (100 mg total) by mouth 3 (three) times daily., Disp: , Rfl:     isosorbide mononitrate ER 30 MG Oral Tablet 24 Hr, Take 1 tablet (30 mg total) by mouth daily., Disp: , Rfl:     methIMAzole 5 MG Oral Tab, Take 1 tablet (5 mg total) by mouth daily., Disp: , Rfl:     metoprolol succinate ER 25 MG Oral Tablet 24 Hr, Take 1 tablet (25 mg total) by mouth daily., Disp: , Rfl:     Olopatadine HCl 0.7 % Ophthalmic Solution, Place 1 drop into both ears daily as needed., Disp: , Rfl:     SITagliptin Phosphate 25 MG Oral Tab, Take 1 tablet (25 mg total) by mouth daily., Disp: , Rfl:     tamsulosin 0.4 MG Oral Cap, Take 1 capsule (0.4 mg total) by mouth daily., Disp: , Rfl:     traMADol 50 MG Oral Tab, Take 1 tablet (50 mg total) by mouth every 8 (eight) hours as needed for Pain., Disp: , Rfl:     traZODone 50 MG Oral Tab, Take 1 tablet (50 mg total) by mouth nightly., Disp: , Rfl:     Allergies:  No Known Allergies     Review of Systems:    Constitutional No fevers, chills, night sweats, excessive fatigue or weight loss.   Eyes No significant visual difficulties. No diplopia. No yellowing of the eyes.   Hematologic/Lymphatic No easy bruising or bleeding.  No any tender or palpable lymph nodes.   Respiratory No dyspnea, Pleuritic chest pain, cough or hemoptysis.   Cardiovascular No anginal chest pain, palpitations or orthopnea.   Gastrointestinal No nausea, vomiting, diarrhea, GI bleeding, or constipation.   Genitorurinary (M) No hematuria, dysuria, or incontinence. No abnormal bleeding.   Integumentary No rashes or  yellowing of the skin   Neurologic No headache, blurred vision, and no areas of focal weakness. Normal gait.   Psychiatric No insomnia, depression, joseph or mood swings.         Vital Signs:  /72 (BP Location: Left arm, Patient Position: Sitting, Cuff Size: adult)   Pulse 94   Temp (!) 96 °F (35.6 °C) (Tympanic)   Resp 18   Ht 1.549 m (5' 0.98\")   Wt 63.5 kg (140 lb 1.6 oz)   SpO2 95%   BMI 26.49 kg/m²     Physical Examination:    Constitutional Normal - Mood and affect appropriate. Appears close to chronological age. Well nourished. Well developed.   Eyes Normal - Conjunctivae and sclerae are clear and without icterus. Pupils are reactive and equal.   Hematologic/Lymphatic Normal - No petechiae or purpura.  No tender or palpable lymph nodes in the cervical, supraclavicular, axillary or inguinal area.   Respiratory Normal - Lungs are clear to auscultation, no rhonchi or wheezing.   Cardiovascular Normal - Regular rate and rhythm, no murmurs, gallops or rubs.   Abdomen Normal - Non-tender, non-distended, no masses, ascites or hepatosplenomegaly.    Extremities Normal - No visible deformities, no cyanosis, clubbing or edema.    Integumentary Normal - No rashes, No Jaundice   Neurologic Normal - No sensory or motor deficits, normal cerebellar function, normal gait, cranial nerves intact.   Psychiatric Normal - Alert and oriented times three. Coherent speech. Verbalizes understanding of our discussions today.         Laboratory:  Recent Labs     01/16/25  1054 01/22/25  1338   RBC  --  4.40   HGB 9.0 L 9.3 L   HCT  --  29.5 L   MCV  --  67.0 L   MCH  --  21.1 L   MCHC  --  31.5   RDW  --  17.5   NEPRELIM  --  8.44 H   WBC  --  10.6   PLT  --  261.0     Iron, TIBC, and ferritin ordered and pending.    Radiology:    Pathology:    Impression and Plan:  Anemia of renal insufficiency. Hgb 9.3 today. He will likely feel a bit better closer tyo 10. Will proceed with an injection today and increase the frequency to  q2 weeks. If this does not raise the hgb, will increase the dose.   Iron labs are pending. Replete stores if needed.    Planned Follow Up:  Q2 week labs with injection  Q3 Month MD visit with labs    The patient and I have a longitudinal relationship to address/treat this serious and complex condition    Electronically Signed by:    Ananda Romero M.D.  [facility]

## 2025-01-22 NOTE — PROGRESS NOTES
Patient is here today for follow up with Dr. Romero for Anemia / Renal Failure. Patient denies pain. Fatigued.  Medication list and medical history were reviewed and updated.     Education Record    Learner:  Patient      Disease / Diagnosis: Anemia associated with chronic renal failure    Barriers / Limitations:  None   Comments:    Method:  Brief focused, Discussion, Printed material and Reinforcement   Comments:    General Topics:  Medication, Pain, Procedure and Plan of care reviewed   Comments:    Outcome:  Shows understanding   Comments:    Post MD visit. Patient sent to waiting room. Treating RN notified.  AVS provided and follow up reviewed.  Patient instructed to call as needed.

## 2025-01-22 NOTE — PROGRESS NOTES
Clarified orders with Dr. Romero-  PL/injection changed to every 2 weeks. Appts made and given to pt.

## 2025-01-28 ENCOUNTER — LAB ENCOUNTER (OUTPATIENT)
Dept: LAB | Age: 89
End: 2025-01-28
Attending: INTERNAL MEDICINE
Payer: MEDICARE

## 2025-01-28 DIAGNOSIS — N18.4 CKD (CHRONIC KIDNEY DISEASE) STAGE 4, GFR 15-29 ML/MIN (HCC): ICD-10-CM

## 2025-01-28 LAB
ANION GAP SERPL CALC-SCNC: 16 MMOL/L (ref 0–18)
BASOPHILS # BLD AUTO: 0.03 X10(3) UL (ref 0–0.2)
BASOPHILS NFR BLD AUTO: 0.3 %
BILIRUB UR QL STRIP.AUTO: NEGATIVE
BUN BLD-MCNC: 82 MG/DL (ref 9–23)
CALCIUM BLD-MCNC: 9.5 MG/DL (ref 8.7–10.6)
CHLORIDE SERPL-SCNC: 104 MMOL/L (ref 98–112)
CLARITY UR REFRACT.AUTO: CLEAR
CO2 SERPL-SCNC: 21 MMOL/L (ref 21–32)
CREAT BLD-MCNC: 4.55 MG/DL
EGFRCR SERPLBLD CKD-EPI 2021: 12 ML/MIN/1.73M2 (ref 60–?)
EOSINOPHIL # BLD AUTO: 0.13 X10(3) UL (ref 0–0.7)
EOSINOPHIL NFR BLD AUTO: 1.4 %
ERYTHROCYTE [DISTWIDTH] IN BLOOD BY AUTOMATED COUNT: 18.6 %
FASTING STATUS PATIENT QL REPORTED: YES
GLUCOSE BLD-MCNC: 108 MG/DL (ref 70–99)
GLUCOSE UR STRIP.AUTO-MCNC: NORMAL MG/DL
HCT VFR BLD AUTO: 31.2 %
HGB BLD-MCNC: 9.6 G/DL
IMM GRANULOCYTES # BLD AUTO: 0.05 X10(3) UL (ref 0–1)
IMM GRANULOCYTES NFR BLD: 0.5 %
KETONES UR STRIP.AUTO-MCNC: NEGATIVE MG/DL
LEUKOCYTE ESTERASE UR QL STRIP.AUTO: NEGATIVE
LYMPHOCYTES # BLD AUTO: 1.26 X10(3) UL (ref 1–4)
LYMPHOCYTES NFR BLD AUTO: 13.7 %
MAGNESIUM SERPL-MCNC: 2 MG/DL (ref 1.6–2.6)
MCH RBC QN AUTO: 21 PG (ref 26–34)
MCHC RBC AUTO-ENTMCNC: 30.8 G/DL (ref 31–37)
MCV RBC AUTO: 68.1 FL
MONOCYTES # BLD AUTO: 0.88 X10(3) UL (ref 0.1–1)
MONOCYTES NFR BLD AUTO: 9.6 %
NEUTROPHILS # BLD AUTO: 6.86 X10 (3) UL (ref 1.5–7.7)
NEUTROPHILS # BLD AUTO: 6.86 X10(3) UL (ref 1.5–7.7)
NEUTROPHILS NFR BLD AUTO: 74.5 %
NITRITE UR QL STRIP.AUTO: NEGATIVE
OSMOLALITY SERPL CALC.SUM OF ELEC: 317 MOSM/KG (ref 275–295)
PH UR STRIP.AUTO: 5.5 [PH] (ref 5–8)
PHOSPHATE SERPL-MCNC: 4.4 MG/DL (ref 2.4–5.1)
PLATELET # BLD AUTO: 223 10(3)UL (ref 150–450)
PLATELETS.RETICULATED NFR BLD AUTO: 5.3 % (ref 0–7)
POTASSIUM SERPL-SCNC: 4.5 MMOL/L (ref 3.5–5.1)
PROT UR STRIP.AUTO-MCNC: 100 MG/DL
PTH-INTACT SERPL-MCNC: 101.6 PG/ML (ref 18.5–88)
RBC # BLD AUTO: 4.58 X10(6)UL
RBC UR QL AUTO: NEGATIVE
SODIUM SERPL-SCNC: 141 MMOL/L (ref 136–145)
SP GR UR STRIP.AUTO: 1.01 (ref 1–1.03)
URATE SERPL-MCNC: 7.1 MG/DL
UROBILINOGEN UR STRIP.AUTO-MCNC: NORMAL MG/DL
VIT D+METAB SERPL-MCNC: 34.1 NG/ML (ref 30–100)
WBC # BLD AUTO: 9.2 X10(3) UL (ref 4–11)

## 2025-01-28 PROCEDURE — 82306 VITAMIN D 25 HYDROXY: CPT

## 2025-01-28 PROCEDURE — 85025 COMPLETE CBC W/AUTO DIFF WBC: CPT

## 2025-01-28 PROCEDURE — 83970 ASSAY OF PARATHORMONE: CPT

## 2025-01-28 PROCEDURE — 83735 ASSAY OF MAGNESIUM: CPT

## 2025-01-28 PROCEDURE — 81001 URINALYSIS AUTO W/SCOPE: CPT

## 2025-01-28 PROCEDURE — 84550 ASSAY OF BLOOD/URIC ACID: CPT

## 2025-01-28 PROCEDURE — 36415 COLL VENOUS BLD VENIPUNCTURE: CPT

## 2025-01-28 PROCEDURE — 84100 ASSAY OF PHOSPHORUS: CPT

## 2025-01-28 PROCEDURE — 80048 BASIC METABOLIC PNL TOTAL CA: CPT

## 2025-02-04 ENCOUNTER — OFFICE VISIT (OUTPATIENT)
Dept: NEPHROLOGY | Facility: CLINIC | Age: 89
End: 2025-02-04
Payer: MEDICARE

## 2025-02-04 VITALS — DIASTOLIC BLOOD PRESSURE: 52 MMHG | BODY MASS INDEX: 26 KG/M2 | WEIGHT: 138.19 LBS | SYSTOLIC BLOOD PRESSURE: 110 MMHG

## 2025-02-04 DIAGNOSIS — N18.4 CKD (CHRONIC KIDNEY DISEASE) STAGE 4, GFR 15-29 ML/MIN (HCC): Primary | ICD-10-CM

## 2025-02-04 RX ORDER — LANCETS 30 GAUGE
1 EACH MISCELLANEOUS DAILY
COMMUNITY
Start: 2025-01-24

## 2025-02-04 RX ORDER — BLOOD SUGAR DIAGNOSTIC
STRIP MISCELLANEOUS DAILY
COMMUNITY
Start: 2025-01-24

## 2025-02-04 RX ORDER — FLUTICASONE PROPIONATE AND SALMETEROL 100; 50 UG/1; UG/1
1 POWDER RESPIRATORY (INHALATION) 2 TIMES DAILY
COMMUNITY
Start: 2025-01-30

## 2025-02-04 RX ORDER — BRIMONIDINE TARTRATE 2 MG/ML
1 SOLUTION/ DROPS OPHTHALMIC 3 TIMES DAILY
COMMUNITY
Start: 2025-01-30

## 2025-02-04 RX ORDER — LANCING DEVICE/LANCETS
1 KIT MISCELLANEOUS DAILY
COMMUNITY
Start: 2025-01-24

## 2025-02-04 NOTE — PROGRESS NOTES
Nephrology Clinic Note      REASON:  CKD4         HPI:   Heavenly Perez is a 89 year old male with   No chief complaint on file.    Brayden Reich MD    88 y/o pleasant male with hx of HTN, CAD, DM, CKD 4/5 presents for follow up   He is doing well  Denies any cp/sob  No n/v  NO edema        Has declined access planning- would prefer to get a HD cath when needs dialysis             ROS:  See above; 12 systems reviewed and otherwise unremarkable     PMH:  Past Medical History:    Atherosclerosis of coronary artery    Diabetes (HCC)    Essential hypertension    Hearing impairment    no hearing aids    Heart attack (HCC)    High blood pressure    High cholesterol    History of angioplasty    History of quadruple bypass    Problems with swallowing    takes meds with applesauce    Renal disorder    ckd    Sleep apnea    no device or appliance         PSH:  Past Surgical History:   Procedure Laterality Date    Cabg           Medications (Active prior to today's visit):  Current Outpatient Medications   Medication Sig Dispense Refill    Cholecalciferol (VITAMIN D3) 25 MCG (1000 UT) Oral Cap Take 1 tablet by mouth daily.      allopurinol 100 MG Oral Tab Take 1 tablet (100 mg total) by mouth daily. 90 tablet 1    calcitriol 0.25 MCG Oral Cap Take 1 capsule (0.25 mcg total) by mouth Every Monday, Wednesday, and Friday. 36 capsule 1    docusate sodium 100 MG Oral Cap Take 1 capsule (100 mg total) by mouth 2 (two) times daily as needed for constipation. 60 capsule 1    torsemide 20 MG Oral Tab Take 1 tablet (20 mg total) by mouth daily as needed (for weight >138 lbs). 30 tablet 1    sodium bicarbonate 650 MG Oral Tab Take 1 tablet (650 mg total) by mouth 2 (two) times daily. 60 tablet 1    predniSONE 20 MG Oral Tab Take 40 mgs (2 tabs) daily x 3 days in event of acute gout attack. Please call your doctor as well. (Patient not taking: Reported on 1/22/2025) 6 tablet 3    Darbepoetin Orlin (ARANESP, ALBUMIN FREE,) 300 MCG/ML  Injection Solution Inject 300 mcg as directed every 30 (thirty) days.      acetaminophen 500 MG Oral Tab Take 2 tablets (1,000 mg total) by mouth 2 (two) times daily as needed for Pain or Fever.      albuterol 108 (90 Base) MCG/ACT Inhalation Aero Soln Inhale 1 puff into the lungs as needed for Wheezing.      amLODIPine 5 MG Oral Tab Take 1 tablet (5 mg total) by mouth daily.      ascorbic acid 1000 MG Oral Tab Take 2 tablets (2,000 mg total) by mouth daily.      atorvastatin 40 MG Oral Tab Take 1 tablet (40 mg total) by mouth daily.      famotidine 40 MG Oral Tab Take 1 tablet (40 mg total) by mouth daily.      ferrous sulfate 325 (65 FE) MG Oral Tab EC Take 1 tablet (325 mg total) by mouth daily. (Patient taking differently: Take 1 tablet (325 mg total) by mouth daily. Take Tuesday and Friday)      hydrALAZINE 100 MG Oral Tab Take 1 tablet (100 mg total) by mouth 3 (three) times daily.      isosorbide mononitrate ER 30 MG Oral Tablet 24 Hr Take 1 tablet (30 mg total) by mouth daily.      methIMAzole 5 MG Oral Tab Take 1 tablet (5 mg total) by mouth daily.      metoprolol succinate ER 25 MG Oral Tablet 24 Hr Take 1 tablet (25 mg total) by mouth daily.      Olopatadine HCl 0.7 % Ophthalmic Solution Place 1 drop into both ears daily as needed.      SITagliptin Phosphate 25 MG Oral Tab Take 1 tablet (25 mg total) by mouth daily.      tamsulosin 0.4 MG Oral Cap Take 1 capsule (0.4 mg total) by mouth daily.      traMADol 50 MG Oral Tab Take 1 tablet (50 mg total) by mouth every 8 (eight) hours as needed for Pain.      traZODone 50 MG Oral Tab Take 1 tablet (50 mg total) by mouth nightly.           Allergies:  No Known Allergies    Social History:  Social History     Socioeconomic History    Marital status: Unknown   Tobacco Use    Smoking status: Former    Smokeless tobacco: Never   Vaping Use    Vaping status: Never Used   Substance and Sexual Activity    Alcohol use: Yes     Comment: occasional    Drug use: Never           Family History:  No family history on file.         PHYSICAL EXAM:   There were no vitals taken for this visit.   Wt Readings from Last 6 Encounters:   01/22/25 140 lb 1.6 oz (63.5 kg)   01/16/25 138 lb 12.8 oz (63 kg)   10/24/24 142 lb 12.8 oz (64.8 kg)   09/24/24 139 lb 3.2 oz (63.1 kg)   08/01/24 137 lb 9.6 oz (62.4 kg)   07/23/24 138 lb 3.2 oz (62.7 kg)     General: Alert and oriented in no apparent distress.  HEENT: No scleral icterus, MMM  Neck: Supple, no ANDRE or thyromegaly  Cardiac: Regular rate and rhythm, MIGUELITO  Lungs: Clear  Abdomen: Soft, non-tender.  Extremities: Without clubbing, cyanosis or edema.  Neurologic:  normal affect, cranial nerves grossly intact, moving all extremities  Skin: Warm and dry, no rashes    Labs reviewed; 1/28          ASSESSMENT/PLAN:   There are no diagnoses linked to this encounter.        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 Romero   BMD - as above;   vit D 1000U daily;   calcitriol 0.25 3x/week- cpm; @ goal  HTN- controlled; meds reviewed; cpm  DM-per primary   CAD- follows w/ Dr. Reddy   HL  Recent gout- increase allopurinol to 100 daily-(risk/benefits/SEs reviewed); will give script for pred if new attack   Acidosis- realted to CKD: start on oral bicarb  Constipation; on miralax;   prn colace     F/u in 2 mos     Evens Rivers MD  2/4/2025

## 2025-02-05 ENCOUNTER — APPOINTMENT (OUTPATIENT)
Age: 89
End: 2025-02-05
Payer: MEDICARE

## 2025-02-06 ENCOUNTER — APPOINTMENT (OUTPATIENT)
Dept: CT IMAGING | Age: 89
End: 2025-02-06
Attending: EMERGENCY MEDICINE
Payer: MEDICARE

## 2025-02-06 ENCOUNTER — HOSPITAL ENCOUNTER (EMERGENCY)
Age: 89
Discharge: HOME | End: 2025-02-06
Attending: EMERGENCY MEDICINE
Payer: MEDICARE

## 2025-02-06 VITALS
HEART RATE: 53 BPM | DIASTOLIC BLOOD PRESSURE: 82 MMHG | BODY MASS INDEX: 23.05 KG/M2 | TEMPERATURE: 97 F | SYSTOLIC BLOOD PRESSURE: 137 MMHG | RESPIRATION RATE: 20 BRPM | OXYGEN SATURATION: 94 % | HEIGHT: 64 IN | WEIGHT: 135 LBS

## 2025-02-06 DIAGNOSIS — R13.14 PHARYNGOESOPHAGEAL DYSPHAGIA: Primary | ICD-10-CM

## 2025-02-06 PROCEDURE — 99284 EMERGENCY DEPT VISIT MOD MDM: CPT

## 2025-02-06 PROCEDURE — 70490 CT SOFT TISSUE NECK W/O DYE: CPT | Performed by: EMERGENCY MEDICINE

## 2025-02-06 RX ORDER — PANTOPRAZOLE SODIUM 20 MG/1
20 TABLET, DELAYED RELEASE ORAL
Qty: 30 TABLET | Refills: 0 | Status: SHIPPED | OUTPATIENT
Start: 2025-02-06 | End: 2025-03-08

## 2025-02-06 NOTE — ED PROVIDER NOTES
Patient Seen in: [facility] In [place]      History     Chief Complaint   Patient presents with    Aspiration     Stated Complaint: difficulty swallowing x3days and breathing,    Subjective:   89-year-old male, presents with difficulty swallowing.  She has past couple of days he feels that he has a phlegm production buildup in his throat.  States he drinks water too fast he feels it gets stuck near his thyroid.  States that he eats solids he gags on it.  Never happened before until 2 days ago.  No choking episodes.  No fever.  No vomiting.              Objective:     Past Medical History:    Atherosclerosis of coronary artery    Diabetes (HCC)    Essential hypertension    Hearing impairment    no hearing aids    Heart attack (HCC)    High blood pressure    High cholesterol    History of angioplasty    History of quadruple bypass    Problems with swallowing    takes meds with applesauce    Renal disorder    ckd    Sleep apnea    no device or appliance              Past Surgical History:   Procedure Laterality Date    Cabg                  Social History     Socioeconomic History    Marital status: Unknown   Tobacco Use    Smoking status: Former    Smokeless tobacco: Never   Vaping Use    Vaping status: Never Used   Substance and Sexual Activity    Alcohol use: Yes     Comment: occasional    Drug use: Never                  Physical Exam     ED Triage Vitals [02/06/25 1034]   /67   Pulse 89   Resp 20   Temp 98.9 °F (37.2 °C)   Temp src Oral   SpO2 93 %   O2 Device None (Room air)       Current Vitals:   Vital Signs  BP: 137/82  Pulse: 53  Resp: 20  Temp: 97.4 °F (36.3 °C)  Temp src: Oral    Oxygen Therapy  SpO2: 94 %  O2 Device: None (Room air)        Physical Exam  Vitals and nursing note reviewed.   Constitutional:       Appearance: He is not toxic-appearing.   HENT:      Head: Normocephalic.      Nose: Nose normal.      Mouth/Throat:      Pharynx: Oropharynx is clear. No oropharyngeal  exudate or posterior oropharyngeal erythema.   Cardiovascular:      Rate and Rhythm: Normal rate.      Pulses: Normal pulses.   Pulmonary:      Effort: Pulmonary effort is normal. No respiratory distress.   Abdominal:      Palpations: Abdomen is soft.   Musculoskeletal:         General: Normal range of motion.      Cervical back: Normal range of motion and neck supple. No rigidity or tenderness.   Lymphadenopathy:      Cervical: No cervical adenopathy.   Skin:     General: Skin is warm and dry.   Neurological:      General: No focal deficit present.      Mental Status: He is alert and oriented to person, place, and time.      Cranial Nerves: No cranial nerve deficit.   Psychiatric:         Mood and Affect: Mood normal.         Behavior: Behavior normal.             ED Course   Labs Reviewed - No data to display                MDM      CT SOFT TISSUE OF NECK (CPT=70490)    Result Date: 2/6/2025  CONCLUSION:  No definite mass lesion to explain the patient's dysphagia.      LOCATION:  [facility]    Dictated by (CST): Fantasma Smith MD on 2/06/2025 at 3:58 PM     Finalized by (CST): Fantasma Smith MD on 2/06/2025 at 4:01 PM        I independent interpreted the CT soft tissue neck with any obvious signs of obstructive process in the esophagus    Daughter at bedside helpful to provide information on history presenting illness    Differential diagnosis includes, but not limited to, thyromegaly, mass, obstruction, GERD    External chart review demonstrates outpatient nephrology visit as recently as about 2 days ago    89-year-old male with some dysphagia.  No trauma.  Never had this problem recurring before.  Cannot recall last time he had an EGD.  Is a duly primary.  Obtain CT, it is well-appearing.  He has had no fever.  No vomiting.  I did drink water in the room when he said it felt like it a hard time going down but is able to keep it down.  He is eating yogurt and protein shakes at home.  He is avoiding much solids  otherwise.  To discussed him and his daughter.  Because of the , they will call tomorrow with an outpatient duly GI appointment for them.  He was listed famotidine states is not taking that, put on Protonix as there is concern for some GERD may be causing some of the symptoms.  His airway is intact.  Is not stridorous.  Is not short of breath.  His lungs are clear, vital signs are stable.  Further workup offer considered and discussed.  They are comfortable to outpatient follow-up.  Discussed with , they will call tomorrow to her daughterTania's phone at the patient's request.  Strict return precaution provided, shared decision made utilized, discussed risk benefits and alternatives    Patient was screened and evaluated during this visit.  As the treating physician attending to the patient, I determined within reasonable clinical confidence and prior to discharge, that an emergency medical condition was not or was no longer present.  There was no indication for further evaluation, treatment, or admission on an emergency basis.  Comprehensive verbal and written discharge and follow-up instructions were provided to help prevent relapse or worsening.  Patient was instructed to follow-up with their primary care provider for further evaluation and treatment, return immediately to ER for worsening, concerning, new, or changing/persisting symptoms. I discussed the case with the patient and they had no questions, complaints, or concerns.  Patient was comfortable going home.     Per the discharge paperwork, patients are encouraged to and given instructions on how to sign up for MyChart, where they have access to their records, including any/all incidental findings.     This note was prepared using Dragon Medical voice recognition dictation software. As a result errors may occur. When identified these errors have been corrected. While every attempt is made to correct errors during dictation  discrepancies may still exist    Note to patient: The 21st Century Cures Act makes medical notes like these available to patients in the interest of transparency. However, this is a medical document intended as peer to peer communication. It is written in medical language and may contain abbreviations or verbiage that are unfamiliar. It may appear blunt or direct. Medical documents are intended to carry relevant information, facts as evident, and the clinical opinion of the practitioner.             Medical Decision Making      Disposition and Plan     Clinical Impression:  1. Pharyngoesophageal dysphagia         Disposition:  Discharge  2/6/2025  5:15 pm    Follow-up:  Brayden Reich MD  [address] [facility]  [address] [place] [id number]  [phone]    Follow up            Medications Prescribed:  Discharge Medication List as of 2/6/2025  5:15 PM        START taking these medications    Details   pantoprazole 20 MG Oral Tab EC Take 1 tablet (20 mg total) by mouth 2 (two) times daily before meals., Normal, Disp-30 tablet, R-0                 Supplementary Documentation:

## 2025-02-06 NOTE — ED INITIAL ASSESSMENT (HPI)
Here with complaints of difficulty swallowing, able to swallow liquids but is unable to swallow chewed up solids. No vomiting. Denies fever, chills or congestion, no resp distress noted

## 2025-02-06 NOTE — ED QUICK NOTES
Patient teaching given regarding the importance of remaining NPO until CT resulted and MD clears. Patient was seen refilling water bottle and was informed by staff that patient continued to drink water after being asked to please hold off. Patient initially refused, daughter-in-law at bedside. Pt and family verbalized understanding.

## 2025-02-07 ENCOUNTER — TELEPHONE (OUTPATIENT)
Dept: CASE MANAGEMENT | Facility: HOSPITAL | Age: 89
End: 2025-02-07

## 2025-02-07 NOTE — CM/SW NOTE
EDCM asked to help patient in obtaining a follow up GI appt within the next week.     Able to secure an appointment with     Dr. Barber (Carlos GONZALES)  For Monday, February 10th at 11 AM  [address]    Daughter, Tania, called and informed of appointment details per request.

## 2025-02-13 ENCOUNTER — APPOINTMENT (OUTPATIENT)
Age: 89
End: 2025-02-13
Attending: INTERNAL MEDICINE
Payer: MEDICARE

## 2025-02-14 ENCOUNTER — HOSPITAL ENCOUNTER (OUTPATIENT)
Dept: GENERAL RADIOLOGY | Age: 89
Discharge: HOME | End: 2025-02-14
Attending: INTERNAL MEDICINE
Payer: MEDICARE

## 2025-02-14 DIAGNOSIS — R13.10 DYSPHAGIA, UNSPECIFIED: ICD-10-CM

## 2025-02-14 PROCEDURE — 74220 X-RAY XM ESOPHAGUS 1CNTRST: CPT | Performed by: INTERNAL MEDICINE

## 2025-02-19 ENCOUNTER — OFFICE VISIT (OUTPATIENT)
Age: 89
End: 2025-02-19
Attending: INTERNAL MEDICINE
Payer: MEDICARE

## 2025-02-19 VITALS
OXYGEN SATURATION: 99 % | HEART RATE: 67 BPM | SYSTOLIC BLOOD PRESSURE: 130 MMHG | TEMPERATURE: 98 F | HEIGHT: 60.98 IN | WEIGHT: 132 LBS | BODY MASS INDEX: 24.92 KG/M2 | DIASTOLIC BLOOD PRESSURE: 74 MMHG | RESPIRATION RATE: 18 BRPM

## 2025-02-19 DIAGNOSIS — N18.4 ANEMIA ASSOCIATED WITH STAGE 4 CHRONIC RENAL FAILURE (HCC): Primary | ICD-10-CM

## 2025-02-19 DIAGNOSIS — D63.1 ANEMIA ASSOCIATED WITH STAGE 4 CHRONIC RENAL FAILURE (HCC): Primary | ICD-10-CM

## 2025-02-19 LAB — HGB BLD-MCNC: 9.2 G/DL

## 2025-02-19 NOTE — PROGRESS NOTES
Education Record    Learner:  Patient    Pt here for: Procrit injection    Barriers / Limitations:  None    Method:  Brief focused, printed material and  reinforcement    General Topics:  Plan of care reviewed    Outcome: Pt tolerated injection with no c/o. Appts in place.

## 2025-03-05 ENCOUNTER — OFFICE VISIT (OUTPATIENT)
Age: 89
End: 2025-03-05
Attending: INTERNAL MEDICINE
Payer: MEDICARE

## 2025-03-05 DIAGNOSIS — N18.4 ANEMIA ASSOCIATED WITH STAGE 4 CHRONIC RENAL FAILURE (HCC): Primary | ICD-10-CM

## 2025-03-05 DIAGNOSIS — D63.1 ANEMIA ASSOCIATED WITH STAGE 4 CHRONIC RENAL FAILURE (HCC): Primary | ICD-10-CM

## 2025-03-05 LAB — HGB BLD-MCNC: 9.6 G/DL

## 2025-03-05 NOTE — PROGRESS NOTES
Education Record    Learner:  Patient    Pt here for: Labs and possible Procrit injection    Barriers / Limitations:  None    Method:  Brief focused discussion and reinforcement    General Topics:  Plan of care reviewed    Outcome: Pt arrived ambulating independently accompanied by self. Hgb 9.6 - Procrit injection given per parameters. Pt tolerated injection with no c/o. Discharged home in stable condition.

## 2025-03-13 ENCOUNTER — APPOINTMENT (OUTPATIENT)
Age: 89
End: 2025-03-13
Attending: INTERNAL MEDICINE
Payer: MEDICARE

## 2025-03-13 ENCOUNTER — HOSPITAL ENCOUNTER (OUTPATIENT)
Dept: GENERAL RADIOLOGY | Facility: HOSPITAL | Age: 89
Discharge: HOME | End: 2025-03-13
Attending: OTOLARYNGOLOGY
Payer: MEDICARE

## 2025-03-13 DIAGNOSIS — R13.10 DYSPHAGIA: ICD-10-CM

## 2025-03-13 PROCEDURE — 92611 MOTION FLUOROSCOPY/SWALLOW: CPT

## 2025-03-13 PROCEDURE — 74230 X-RAY XM SWLNG FUNCJ C+: CPT | Performed by: OTOLARYNGOLOGY

## 2025-03-13 NOTE — PROGRESS NOTES
ADULT VIDEOFLUOROSCOPIC SWALLOWING STUDY    Admission Date: 3/13/2025  Evaluation Date: 03/13/25  Radiologist: Dr. Martini    RECOMMENDATIONS   Diet Recommendations - Solids: Regular  Diet Recommendations - Liquids: Thin Liquids    Further Follow-up:  Follow Up Needed (Documentation Required): No     Compensatory Strategies Recommended: Alternate consistencies  Aspiration Precautions: Upright position, Slow rate, Small bites, Small sips  Medication Administration Recommendations: No restrictions  Treatment Plan/Recommendations:  (EGD appointment on 03/17/2025)    HISTORY   Background/Objective Information:  Pt was referred by ENT to complete a VFSS to obtain objective measures, determine severity, recommend safe swallow support strategies, and determine POC. Pt was admitted to [facility] in [place] on 02/06/2025 with symptoms of dysphagia. CT of soft tissue of the neck reveled no definite mass lesion to explain Pts dysphagia on 02/06/2025.  Pt was referred to OP GI and had initial visit on 02/10/2025. EGD on 02/12/2025 revealed an inflammation of the esophagus lining. 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 Pt completed VFSS on 03/13/2025 results discussed below. Pt has an EGD appointment with possible botox at [facility] on 03/17/2025.    At today's visit, Pt reported that he has not eaten anything for the past three weeks, and has lost 13 pounds. Pt reports he feels nauseous sometimes. Pt feels like his voice is \"heavy\", but Pt had periods of clear voicing during evaluation. Pt reported he has a sensation of buildup of mucous in his pharynx. SLP observed Pt regurgitating mucous before and after po trials. Pt self limits to puree/thin liquids as Pt is unable to tolerate regular foods. Pt reported he crushes his pills up into his drinks.        Problem List  Active Problems:    * No active hospital  problems. *      Past Medical History  Past Medical History:    Atherosclerosis of coronary artery    Diabetes (HCC)    Essential hypertension    Hearing impairment    no hearing aids    Heart attack (HCC)    High blood pressure    High cholesterol    History of angioplasty    History of quadruple bypass    Problems with swallowing    takes meds with applesauce    Renal disorder    ckd    Sleep apnea    no device or appliance       Current Diet Consistency: Puree, Thin liquids  Prior Level of Function: Independent  Prior Living Situation: Home alone  History of Recent: No recent respiratory difficulty     Imaging results:   XR Video Swallow 03/13/2025:  FINDINGS:    PHARYNGEAL PHASE:  Normal for age.   ASPIRATION:  None.   PENETRATION:  Normal.   OTHER:  Negative.      PLEASE SEE SPEECH PATHOLOGIST REPORT FOR FULL ASSESSMENT OF SWALLOWING MECHANISM.         LOCATION:  [facility]            Dictated by (CST): Alex Martini MD on 3/13/2025 at 10:35 AM       Finalized by (CST): Alex Martini MD on 3/13/2025 at 10:35 AM     XR Esophagus Single Contrast 02/14/2025:  CONCLUSION:    1. Narrowed segment of distal esophagus.   2. 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   3. Mid esophageal diverticulum.          LOCATION:  [facility] [place]         Dictated by (CST): Rogers De Jesus MD on 2/14/2025 at 10:30 AM       Finalized by (CST): Rogers De Jesus MD on 2/14/2025 at 10:36 AM              Family/Patient Goals: none stated    ASSESSMENT   DYSPHAGIA ASSESSMENT  Test completed in conjunction with Radiologist.  Patient Positioned: Midline, Upright, NARESH chair.  Patient Viewed: Lateral.   .  Consistencies Presented: Thin liquids, Puree to assess oropharyngeal swallow function and assess for compensatory strategies to improve safe swallow function.    THIN LIQUIDS  Method of Presentation: Cup  Oral Phase of Swallow (VFSS - Thin Liquids): Within Functional Limits  Triggered at: Valleculae  Residue Severity, Location: Trace, Valleculae, Base of  tongue  Cleared/Reduced with: Secondary swallow, Independent  Laryngeal Penetration: None  Tracheal Aspiration: None  Cough/Throat Clear Response: No  Strategy(ies) Implemented (Thin Liquids): Slow rate  Effectiveness: Yes        PUREE  Oral Phase of Swallow (VFSS - Puree): Within Functional Limits  Triggered at: Base of tongue  Residue Severity, Location: Trace, Base of tongue, Posterior pharyngeal wall  Cleared/Reduced with: Independent, Secondary swallow  Laryngeal Penetration: None  Tracheal Aspiration: None  Cough/Throat Clear Response: No  Strategy(ies) Implemented (Puree): Alternate consistencies, Slow rate  Effectiveness: Yes        Penetration Aspiration Scale Score: Score 1: Material does not enter airway       Overall Impression:    Pt had adequate bilabial seal. Pharyngeal swallow response triggered in a timely manner at the valleculae (thin liquids) and BOT (puree). Pt had adequate tongue base retraction,  pharyngeal constriction, and epiglottic inversion. Pt had complete closure of the laryngeal vestibule. There was no impairment of bolus flow through the UES.     From an oropharyngeal standpoint, Pt is appropriate for regular consistency with thin liquids. Pt self limiting to puree and thin liquids. Given known esophageal component and planned EGD with dilatation and possible botox for 03/17/2025, defer further diet recommendation advancement pending physician.                     PLAN:  Pt has EGD appointment on 03/17/2025 at  with dil/Botox. Follow up with recommended physician.      EDUCATION/INSTRUCTION  Reviewed results and recommendations with patient/family/caregiver.  Agreement/Understanding verbalized and all questions answered to their apparent satisfaction.    INTERDISCIPLINARY COMMUNICATION  Reviewed results with Radiologist; agreement verbalized.    Patient Experiencing Pain: No                FOLLOW UP  Treatment Plan/Recommendations:  (EGD appointment on 03/17/2025)           Thank  you for your referral.   If you have any questions, please contact Yris Huddleston  Clinician   [facility] [phone]

## 2025-03-19 ENCOUNTER — OFFICE VISIT (OUTPATIENT)
Age: 89
End: 2025-03-19
Attending: INTERNAL MEDICINE
Payer: MEDICARE

## 2025-03-19 VITALS
RESPIRATION RATE: 18 BRPM | TEMPERATURE: 98 F | BODY MASS INDEX: 24.15 KG/M2 | HEIGHT: 60.98 IN | WEIGHT: 127.88 LBS | SYSTOLIC BLOOD PRESSURE: 150 MMHG | HEART RATE: 89 BPM | OXYGEN SATURATION: 96 % | DIASTOLIC BLOOD PRESSURE: 67 MMHG

## 2025-03-19 DIAGNOSIS — D63.1 ANEMIA ASSOCIATED WITH STAGE 4 CHRONIC RENAL FAILURE (HCC): Primary | ICD-10-CM

## 2025-03-19 DIAGNOSIS — N18.4 ANEMIA ASSOCIATED WITH STAGE 4 CHRONIC RENAL FAILURE (HCC): Primary | ICD-10-CM

## 2025-03-19 LAB — HGB BLD-MCNC: 9.8 G/DL

## 2025-03-19 NOTE — PROGRESS NOTES
Patient  here for procrit, hgb 9.8, injection given per parameters. Patient  dc'd home in stable condition, AVS given with next appts.

## 2025-04-02 ENCOUNTER — LAB ENCOUNTER (OUTPATIENT)
Dept: LAB | Age: 89
End: 2025-04-02
Attending: INTERNAL MEDICINE
Payer: MEDICARE

## 2025-04-02 ENCOUNTER — OFFICE VISIT (OUTPATIENT)
Age: 89
End: 2025-04-02
Attending: INTERNAL MEDICINE
Payer: MEDICARE

## 2025-04-02 DIAGNOSIS — D63.1 ANEMIA ASSOCIATED WITH STAGE 4 CHRONIC RENAL FAILURE (HCC): ICD-10-CM

## 2025-04-02 DIAGNOSIS — N18.4 CKD (CHRONIC KIDNEY DISEASE) STAGE 4, GFR 15-29 ML/MIN (HCC): ICD-10-CM

## 2025-04-02 DIAGNOSIS — N18.4 ANEMIA ASSOCIATED WITH STAGE 4 CHRONIC RENAL FAILURE (HCC): ICD-10-CM

## 2025-04-02 LAB
ANION GAP SERPL CALC-SCNC: 12 MMOL/L (ref 0–18)
BASOPHILS # BLD AUTO: 0.02 X10(3) UL (ref 0–0.2)
BASOPHILS NFR BLD AUTO: 0.3 %
BUN BLD-MCNC: 59 MG/DL (ref 9–23)
CALCIUM BLD-MCNC: 9.7 MG/DL (ref 8.7–10.6)
CHLORIDE SERPL-SCNC: 102 MMOL/L (ref 98–112)
CO2 SERPL-SCNC: 24 MMOL/L (ref 21–32)
CREAT BLD-MCNC: 3.99 MG/DL
EGFRCR SERPLBLD CKD-EPI 2021: 14 ML/MIN/1.73M2 (ref 60–?)
EOSINOPHIL # BLD AUTO: 0.07 X10(3) UL (ref 0–0.7)
EOSINOPHIL NFR BLD AUTO: 0.9 %
ERYTHROCYTE [DISTWIDTH] IN BLOOD BY AUTOMATED COUNT: 16.2 %
FASTING STATUS PATIENT QL REPORTED: YES
GLUCOSE BLD-MCNC: 110 MG/DL (ref 70–99)
HCT VFR BLD AUTO: 33.2 %
HGB BLD-MCNC: 10.2 G/DL
IMM GRANULOCYTES # BLD AUTO: 0.05 X10(3) UL (ref 0–1)
IMM GRANULOCYTES NFR BLD: 0.7 %
LYMPHOCYTES # BLD AUTO: 0.96 X10(3) UL (ref 1–4)
LYMPHOCYTES NFR BLD AUTO: 13 %
MAGNESIUM SERPL-MCNC: 2.1 MG/DL (ref 1.6–2.6)
MCH RBC QN AUTO: 21.3 PG (ref 26–34)
MCHC RBC AUTO-ENTMCNC: 30.7 G/DL (ref 31–37)
MCV RBC AUTO: 69.3 FL
MONOCYTES # BLD AUTO: 0.69 X10(3) UL (ref 0.1–1)
MONOCYTES NFR BLD AUTO: 9.3 %
NEUTROPHILS # BLD AUTO: 5.62 X10 (3) UL (ref 1.5–7.7)
NEUTROPHILS # BLD AUTO: 5.62 X10(3) UL (ref 1.5–7.7)
NEUTROPHILS NFR BLD AUTO: 75.8 %
OSMOLALITY SERPL CALC.SUM OF ELEC: 303 MOSM/KG (ref 275–295)
PHOSPHATE SERPL-MCNC: 3.6 MG/DL (ref 2.4–5.1)
PLATELET # BLD AUTO: 192 10(3)UL (ref 150–450)
PLATELETS.RETICULATED NFR BLD AUTO: 4.1 % (ref 0–7)
POTASSIUM SERPL-SCNC: 5.8 MMOL/L (ref 3.5–5.1)
RBC # BLD AUTO: 4.79 X10(6)UL
SODIUM SERPL-SCNC: 138 MMOL/L (ref 136–145)
WBC # BLD AUTO: 7.4 X10(3) UL (ref 4–11)

## 2025-04-02 PROCEDURE — 36415 COLL VENOUS BLD VENIPUNCTURE: CPT

## 2025-04-02 PROCEDURE — 85025 COMPLETE CBC W/AUTO DIFF WBC: CPT

## 2025-04-02 PROCEDURE — 84100 ASSAY OF PHOSPHORUS: CPT

## 2025-04-02 PROCEDURE — 80048 BASIC METABOLIC PNL TOTAL CA: CPT

## 2025-04-02 PROCEDURE — 83735 ASSAY OF MAGNESIUM: CPT

## 2025-04-02 NOTE — PROGRESS NOTES
Pt labs drawn today in outpatient lab. Hemoglobin beyond injection parameter - no injection given. Pt has appointment in 2 weeks with MD.

## 2025-04-04 ENCOUNTER — TELEPHONE (OUTPATIENT)
Dept: NEPHROLOGY | Facility: CLINIC | Age: 89
End: 2025-04-04

## 2025-04-04 DIAGNOSIS — E87.5 HYPERKALEMIA: Primary | ICD-10-CM

## 2025-04-08 ENCOUNTER — OFFICE VISIT (OUTPATIENT)
Dept: NEPHROLOGY | Facility: CLINIC | Age: 89
End: 2025-04-08
Payer: MEDICARE

## 2025-04-08 VITALS — DIASTOLIC BLOOD PRESSURE: 72 MMHG | BODY MASS INDEX: 24 KG/M2 | SYSTOLIC BLOOD PRESSURE: 147 MMHG | WEIGHT: 125.38 LBS

## 2025-04-08 DIAGNOSIS — E87.5 HYPERKALEMIA: ICD-10-CM

## 2025-04-08 DIAGNOSIS — N18.4 CKD (CHRONIC KIDNEY DISEASE) STAGE 4, GFR 15-29 ML/MIN (HCC): Primary | ICD-10-CM

## 2025-04-08 PROCEDURE — 99214 OFFICE O/P EST MOD 30 MIN: CPT | Performed by: INTERNAL MEDICINE

## 2025-04-08 NOTE — PROGRESS NOTES
Nephrology Clinic Note      REASON:  CKD4         HPI:   Heavenly Perez is a 89 year old male with   No chief complaint on file.    Brayden Reich MD    88 y/o pleasant male with hx of HTN, CAD, DM, CKD 4/5 presents for follow up   He is doing well  Denies any cp/sob  No n/v  NO edema     Patient w/ recent diagnosis of esophageal stricture- has been s/p esophageal dilation ([facility]).  He has not been eating normally- drinking ensure tid + drink juices etc  WAs off his usual meds for few weeks, but has been on for last week or so    Recent labs showed hyperkalemia     Has declined access planning- would prefer to get a HD cath when needs dialysis             ROS:  See above; 12 systems reviewed and otherwise unremarkable     PMH:  Past Medical History:    Atherosclerosis of coronary artery    Diabetes (HCC)    Essential hypertension    Hearing impairment    no hearing aids    Heart attack (HCC)    High blood pressure    High cholesterol    History of angioplasty    History of quadruple bypass    Problems with swallowing    takes meds with applesauce    Renal disorder    ckd    Sleep apnea    no device or appliance         PSH:  Past Surgical History:   Procedure Laterality Date    Cabg           Medications (Active prior to today's visit):  Current Outpatient Medications   Medication Sig Dispense Refill    brimonidine 0.2 % Ophthalmic Solution Place 1 drop into the right eye 3 (three) times daily.      fluticasone-salmeterol 100-50 MCG/ACT Inhalation Aerosol Powder, Breath Activated Inhale 1 puff into the lungs 2 (two) times daily.      ONETOUCH ULTRA In Vitro Strip by In Vitro route daily.      Lancet Devices (ONETOUCH DELICA PLUS LANCING) Does not apply Misc 1 strip by In Vitro route daily.      Lancets (ONETOUCH DELICA PLUS RFOWTG31V) Does not apply Misc 1 strip by In Vitro route daily.      Cholecalciferol (VITAMIN D3) 25 MCG (1000 UT) Oral Cap Take 1 tablet by mouth daily.      allopurinol 100 MG Oral Tab  Take 1 tablet (100 mg total) by mouth daily. 90 tablet 1    calcitriol 0.25 MCG Oral Cap Take 1 capsule (0.25 mcg total) by mouth Every Monday, Wednesday, and Friday. 36 capsule 1    docusate sodium 100 MG Oral Cap Take 1 capsule (100 mg total) by mouth 2 (two) times daily as needed for constipation. 60 capsule 1    torsemide 20 MG Oral Tab Take 1 tablet (20 mg total) by mouth daily as needed (for weight >138 lbs). 30 tablet 1    sodium bicarbonate 650 MG Oral Tab Take 1 tablet (650 mg total) by mouth 2 (two) times daily. 60 tablet 1    predniSONE 20 MG Oral Tab Take 40 mgs (2 tabs) daily x 3 days in event of acute gout attack. Please call your doctor as well. (Patient not taking: Reported on 10/24/2024) 6 tablet 3    Darbepoetin Orlin (ARANESP, ALBUMIN FREE,) 300 MCG/ML Injection Solution Inject 300 mcg as directed every 30 (thirty) days.      acetaminophen 500 MG Oral Tab Take 2 tablets (1,000 mg total) by mouth 2 (two) times daily as needed for Pain or Fever.      albuterol 108 (90 Base) MCG/ACT Inhalation Aero Soln Inhale 1 puff into the lungs as needed for Wheezing.      amLODIPine 5 MG Oral Tab Take 1 tablet (5 mg total) by mouth daily.      ascorbic acid 1000 MG Oral Tab Take 2 tablets (2,000 mg total) by mouth daily.      atorvastatin 40 MG Oral Tab Take 1 tablet (40 mg total) by mouth daily.      famotidine 40 MG Oral Tab Take 1 tablet (40 mg total) by mouth daily.      ferrous sulfate 325 (65 FE) MG Oral Tab EC Take 1 tablet (325 mg total) by mouth daily. (Patient taking differently: Take 1 tablet (325 mg total) by mouth daily. Take Tuesday and Friday)      hydrALAZINE 100 MG Oral Tab Take 1 tablet (100 mg total) by mouth 3 (three) times daily.      isosorbide mononitrate ER 30 MG Oral Tablet 24 Hr Take 1 tablet (30 mg total) by mouth daily.      methIMAzole 5 MG Oral Tab Take 1 tablet (5 mg total) by mouth daily.      metoprolol succinate ER 25 MG Oral Tablet 24 Hr Take 1 tablet (25 mg total) by mouth  daily.      Olopatadine HCl 0.7 % Ophthalmic Solution Place 1 drop into both ears daily as needed.      SITagliptin Phosphate 25 MG Oral Tab Take 1 tablet (25 mg total) by mouth daily.      tamsulosin 0.4 MG Oral Cap Take 1 capsule (0.4 mg total) by mouth daily.      traMADol 50 MG Oral Tab Take 1 tablet (50 mg total) by mouth every 8 (eight) hours as needed for Pain.      traZODone 50 MG Oral Tab Take 1 tablet (50 mg total) by mouth nightly.           Allergies:  Not on File    Social History:  Social History     Socioeconomic History    Marital status:    Tobacco Use    Smoking status: Former    Smokeless tobacco: Never   Vaping Use    Vaping status: Never Used   Substance and Sexual Activity    Alcohol use: Yes     Comment: occasional    Drug use: Never          Family History:  No family history on file.         PHYSICAL EXAM:   There were no vitals taken for this visit.   Wt Readings from Last 6 Encounters:   03/19/25 127 lb 13.9 oz (58 kg)   02/19/25 132 lb (59.9 kg)   02/06/25 135 lb (61.2 kg)   02/04/25 138 lb 3.2 oz (62.7 kg)   01/22/25 140 lb 1.6 oz (63.5 kg)   01/16/25 138 lb 12.8 oz (63 kg)     General: Alert and oriented in no apparent distress.  HEENT: No scleral icterus, MMM  Neck: Supple, no ANDRE or thyromegaly  Cardiac: Regular rate and rhythm, MIGUELITO  Lungs: Clear  Abdomen: Soft, non-tender.  Extremities: Without clubbing, cyanosis or edema.  Neurologic:  normal affect, cranial nerves grossly intact, moving all extremities  Skin: Warm and dry, no rashes    Labs reviewed; 1/28          ASSESSMENT/PLAN:   There are no diagnoses linked to this encounter.        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 Also discussed low K diet - his liquid diet has been a limiting issue as to what foods he can eat  Anemia - follows w/ Dr. Romero   BMD - as above;   vit D 1000U daily;   calcitriol 0.25 3x/week- cpm; @ goal  HTN- controlled; meds reviewed; cpm  DM-per primary   CAD- follows w/ Dr. Reddy   HL  Recent gout- increase allopurinol to 100 daily-(risk/benefits/SEs reviewed); will give script for pred if new attack   Acidosis- realted to CKD:     oral bicarb  Constipation; on miralax;   prn colace     F/u in 2 mos ; will await repeat bmp after the kayexalate     Evens Rivers MD  4/8/2025

## 2025-04-10 ENCOUNTER — APPOINTMENT (OUTPATIENT)
Age: 89
End: 2025-04-10
Attending: INTERNAL MEDICINE
Payer: MEDICARE

## 2025-04-12 ENCOUNTER — LAB ENCOUNTER (OUTPATIENT)
Dept: LAB | Age: 89
End: 2025-04-12
Attending: INTERNAL MEDICINE
Payer: MEDICARE

## 2025-04-12 DIAGNOSIS — E87.5 HYPERKALEMIA: ICD-10-CM

## 2025-04-12 LAB
ANION GAP SERPL CALC-SCNC: 12 MMOL/L (ref 0–18)
BUN BLD-MCNC: 53 MG/DL (ref 9–23)
CALCIUM BLD-MCNC: 9.2 MG/DL (ref 8.7–10.6)
CHLORIDE SERPL-SCNC: 101 MMOL/L (ref 98–112)
CO2 SERPL-SCNC: 27 MMOL/L (ref 21–32)
CREAT BLD-MCNC: 3.73 MG/DL (ref 0.7–1.3)
EGFRCR SERPLBLD CKD-EPI 2021: 15 ML/MIN/1.73M2 (ref 60–?)
FASTING STATUS PATIENT QL REPORTED: NO
GLUCOSE BLD-MCNC: 103 MG/DL (ref 70–99)
OSMOLALITY SERPL CALC.SUM OF ELEC: 305 MOSM/KG (ref 275–295)
POTASSIUM SERPL-SCNC: 3.6 MMOL/L (ref 3.5–5.1)
SODIUM SERPL-SCNC: 140 MMOL/L (ref 136–145)

## 2025-04-12 PROCEDURE — 36415 COLL VENOUS BLD VENIPUNCTURE: CPT

## 2025-04-12 PROCEDURE — 80048 BASIC METABOLIC PNL TOTAL CA: CPT

## 2025-04-16 ENCOUNTER — OFFICE VISIT (OUTPATIENT)
Age: 89
End: 2025-04-16
Attending: INTERNAL MEDICINE
Payer: MEDICARE

## 2025-04-16 VITALS
TEMPERATURE: 97 F | DIASTOLIC BLOOD PRESSURE: 78 MMHG | OXYGEN SATURATION: 98 % | RESPIRATION RATE: 18 BRPM | HEART RATE: 86 BPM | HEIGHT: 60.98 IN | BODY MASS INDEX: 22.84 KG/M2 | SYSTOLIC BLOOD PRESSURE: 157 MMHG | WEIGHT: 121 LBS

## 2025-04-16 DIAGNOSIS — D63.1 ANEMIA ASSOCIATED WITH STAGE 4 CHRONIC RENAL FAILURE (HCC): Primary | ICD-10-CM

## 2025-04-16 DIAGNOSIS — N18.4 ANEMIA ASSOCIATED WITH STAGE 4 CHRONIC RENAL FAILURE (HCC): Primary | ICD-10-CM

## 2025-04-16 LAB
ALBUMIN SERPL-MCNC: 4.4 G/DL (ref 3.2–4.8)
ALBUMIN/GLOB SERPL: 1.6 {RATIO} (ref 1–2)
ALP LIVER SERPL-CCNC: 56 U/L (ref 45–117)
ALT SERPL-CCNC: 10 U/L (ref 10–49)
ANION GAP SERPL CALC-SCNC: 5 MMOL/L (ref 0–18)
AST SERPL-CCNC: 14 U/L (ref ?–34)
BASOPHILS # BLD AUTO: 0.03 X10(3) UL (ref 0–0.2)
BASOPHILS NFR BLD AUTO: 0.4 %
BILIRUB SERPL-MCNC: 0.4 MG/DL (ref 0.2–1.1)
BUN BLD-MCNC: 45 MG/DL (ref 9–23)
CALCIUM BLD-MCNC: 9.5 MG/DL (ref 8.7–10.6)
CHLORIDE SERPL-SCNC: 99 MMOL/L (ref 98–112)
CO2 SERPL-SCNC: 27 MMOL/L (ref 21–32)
CREAT BLD-MCNC: 4.02 MG/DL (ref 0.7–1.3)
DEPRECATED HBV CORE AB SER IA-ACNC: 628 NG/ML (ref 50–336)
EGFRCR SERPLBLD CKD-EPI 2021: 14 ML/MIN/1.73M2 (ref 60–?)
EOSINOPHIL # BLD AUTO: 0.04 X10(3) UL (ref 0–0.7)
EOSINOPHIL NFR BLD AUTO: 0.5 %
ERYTHROCYTE [DISTWIDTH] IN BLOOD BY AUTOMATED COUNT: 15.1 %
FASTING STATUS PATIENT QL REPORTED: NO
GLOBULIN PLAS-MCNC: 2.7 G/DL (ref 2–3.5)
GLUCOSE BLD-MCNC: 101 MG/DL (ref 70–99)
HCT VFR BLD AUTO: 32.4 % (ref 39–53)
HGB BLD-MCNC: 10.6 G/DL (ref 13–17.5)
IMM GRANULOCYTES # BLD AUTO: 0.03 X10(3) UL (ref 0–1)
IMM GRANULOCYTES NFR BLD: 0.4 %
IRON SATN MFR SERPL: 18 % (ref 20–50)
IRON SERPL-MCNC: 41 UG/DL (ref 65–175)
LYMPHOCYTES # BLD AUTO: 0.83 X10(3) UL (ref 1–4)
LYMPHOCYTES NFR BLD AUTO: 9.9 %
MCH RBC QN AUTO: 21.7 PG (ref 26–34)
MCHC RBC AUTO-ENTMCNC: 32.7 G/DL (ref 31–37)
MCV RBC AUTO: 66.4 FL (ref 80–100)
MONOCYTES # BLD AUTO: 0.81 X10(3) UL (ref 0.1–1)
MONOCYTES NFR BLD AUTO: 9.7 %
NEUTROPHILS # BLD AUTO: 6.61 X10 (3) UL (ref 1.5–7.7)
NEUTROPHILS # BLD AUTO: 6.61 X10(3) UL (ref 1.5–7.7)
NEUTROPHILS NFR BLD AUTO: 79.1 %
OSMOLALITY SERPL CALC.SUM OF ELEC: 284 MOSM/KG (ref 275–295)
PLATELET # BLD AUTO: 281 10(3)UL (ref 150–450)
POTASSIUM SERPL-SCNC: 5.7 MMOL/L (ref 3.5–5.1)
PROT SERPL-MCNC: 7.1 G/DL (ref 5.7–8.2)
RBC # BLD AUTO: 4.88 X10(6)UL (ref 3.8–5.8)
SODIUM SERPL-SCNC: 131 MMOL/L (ref 136–145)
TOTAL IRON BINDING CAPACITY: 231 UG/DL (ref 250–425)
TRANSFERRIN SERPL-MCNC: 174 MG/DL (ref 215–365)
WBC # BLD AUTO: 8.4 X10(3) UL (ref 4–11)

## 2025-04-16 NOTE — PROGRESS NOTES
Cancer Center Progress Note  Patient Name: Heavenly Perez   YOB: 1935   Medical Record Number: [id number]   CSN: [id number]   Attending Physician: Ananda Romero M.D.       Date of Visit: 4/16/25      Chief Complaint:  No chief complaint on file.       Oncologic History:  Heavenly Perez is a 89 year old male referred by Dr. Rivers for evaluation of anemia.  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 He was getting it at [facility] before moving his nephrology care to [facility]. Starting at [facility], he was transitioned to Aranesp 300mcg monthly. He reports that he has tolerated it well, but he feels that his count has risen too high, and the Aranesp is far more expensive than the CANDE he was getting at [facility] (Retacrit).  He is referred to see if he can get his dosing here, with retacrit.     History of Present Illness:  Pt is here for follow up. HE has stable chronic low back pain. No other complaints.     Performance Status:  ECOG 1    Past Medical History:  Past Medical History:    Atherosclerosis of coronary artery    Diabetes (HCC)    Essential hypertension    Hearing impairment    no hearing aids    Heart attack (HCC)    High blood pressure    High cholesterol    History of angioplasty    History of quadruple bypass    Problems with swallowing    takes meds with applesauce    Renal disorder    ckd    Sleep apnea    no device or appliance       Past Surgical History:  Past Surgical History:   Procedure Laterality Date    Cabg         Family History:  No family history on file.    Social History:  Social History     Socioeconomic History    Marital status:      Spouse name: Not on file    Number of children: Not on file    Years of education: Not on file    Highest education level: Not on file   Occupational History    Not on file   Tobacco Use    Smoking status: Former    Smokeless tobacco: Never   Vaping  Use    Vaping status: Never Used   Substance and Sexual Activity    Alcohol use: Yes     Comment: occasional    Drug use: Never    Sexual activity: Not on file   Other Topics Concern    Not on file   Social History Narrative    Not on file     Social Drivers of Health     Food Insecurity: Not on file   Transportation Needs: Not on file   Housing Stability: Not on file       Current Medications:    Current Outpatient Medications:     sodium zirconium cyclosilicate (LOKELMA) 10 g Oral Powd Pack, Take 1 packet (10 g total) by mouth daily., Disp: 30 packet, Rfl: 0    brimonidine 0.2 % Ophthalmic Solution, Place 1 drop into the right eye 3 (three) times daily., Disp: , Rfl:     fluticasone-salmeterol 100-50 MCG/ACT Inhalation Aerosol Powder, Breath Activated, Inhale 1 puff into the lungs 2 (two) times daily., Disp: , Rfl:     ONETOUCH ULTRA In Vitro Strip, by In Vitro route daily., Disp: , Rfl:     Lancet Devices (ONETOUCH DELICA PLUS LANCING) Does not apply Misc, 1 strip by In Vitro route daily., Disp: , Rfl:     Lancets (ONETOUCH DELICA PLUS [id number]) Does not apply Misc, 1 strip by In Vitro route daily., Disp: , Rfl:     Cholecalciferol (VITAMIN D3) 25 MCG (1000 UT) Oral Cap, Take 1 tablet by mouth daily., Disp: , Rfl:     allopurinol 100 MG Oral Tab, Take 1 tablet (100 mg total) by mouth daily., Disp: 90 tablet, Rfl: 1    calcitriol 0.25 MCG Oral Cap, Take 1 capsule (0.25 mcg total) by mouth Every Monday, Wednesday, and Friday., Disp: 36 capsule, Rfl: 1    docusate sodium 100 MG Oral Cap, Take 1 capsule (100 mg total) by mouth 2 (two) times daily as needed for constipation., Disp: 60 capsule, Rfl: 1    torsemide 20 MG Oral Tab, Take 1 tablet (20 mg total) by mouth daily as needed (for weight >138 lbs)., Disp: 30 tablet, Rfl: 1    sodium bicarbonate 650 MG Oral Tab, Take 1 tablet (650 mg total) by mouth 2 (two) times daily., Disp: 60 tablet, Rfl: 1    predniSONE 20 MG Oral Tab, Take 40 mgs (2 tabs) daily x 3 days in  event of acute gout attack. Please call your doctor as well. (Patient not taking: Reported on 10/24/2024), Disp: 6 tablet, Rfl: 3    Darbepoetin Orlin (ARANESP, ALBUMIN FREE,) 300 MCG/ML Injection Solution, Inject 300 mcg as directed every 30 (thirty) days., Disp: , Rfl:     acetaminophen 500 MG Oral Tab, Take 2 tablets (1,000 mg total) by mouth 2 (two) times daily as needed for Pain or Fever., Disp: , Rfl:     albuterol 108 (90 Base) MCG/ACT Inhalation Aero Soln, Inhale 1 puff into the lungs as needed for Wheezing., Disp: , Rfl:     amLODIPine 5 MG Oral Tab, Take 1 tablet (5 mg total) by mouth daily., Disp: , Rfl:     ascorbic acid 1000 MG Oral Tab, Take 2 tablets (2,000 mg total) by mouth daily., Disp: , Rfl:     atorvastatin 40 MG Oral Tab, Take 1 tablet (40 mg total) by mouth daily., Disp: , Rfl:     famotidine 40 MG Oral Tab, Take 1 tablet (40 mg total) by mouth daily., Disp: , Rfl:     ferrous sulfate 325 (65 FE) MG Oral Tab EC, Take 1 tablet (325 mg total) by mouth daily. (Patient taking differently: Take 1 tablet (325 mg total) by mouth daily. Take Tuesday and Friday), Disp: , Rfl:     hydrALAZINE 100 MG Oral Tab, Take 1 tablet (100 mg total) by mouth 3 (three) times daily., Disp: , Rfl:     isosorbide mononitrate ER 30 MG Oral Tablet 24 Hr, Take 1 tablet (30 mg total) by mouth daily., Disp: , Rfl:     methIMAzole 5 MG Oral Tab, Take 1 tablet (5 mg total) by mouth daily., Disp: , Rfl:     metoprolol succinate ER 25 MG Oral Tablet 24 Hr, Take 1 tablet (25 mg total) by mouth daily., Disp: , Rfl:     Olopatadine HCl 0.7 % Ophthalmic Solution, Place 1 drop into both ears daily as needed., Disp: , Rfl:     SITagliptin Phosphate 25 MG Oral Tab, Take 1 tablet (25 mg total) by mouth daily., Disp: , Rfl:     tamsulosin 0.4 MG Oral Cap, Take 1 capsule (0.4 mg total) by mouth daily., Disp: , Rfl:     traMADol 50 MG Oral Tab, Take 1 tablet (50 mg total) by mouth every 8 (eight) hours as needed for Pain., Disp: , Rfl:      traZODone 50 MG Oral Tab, Take 1 tablet (50 mg total) by mouth nightly., Disp: , Rfl:     Allergies:  Not on File     Review of Systems:    Constitutional No fevers, chills, night sweats, excessive fatigue or weight loss.   Eyes No significant visual difficulties. No diplopia. No yellowing of the eyes.   Hematologic/Lymphatic No easy bruising or bleeding.  No any tender or palpable lymph nodes.   Respiratory No dyspnea, Pleuritic chest pain, cough or hemoptysis.   Cardiovascular No anginal chest pain, palpitations or orthopnea.   Gastrointestinal No nausea, vomiting, diarrhea, GI bleeding, or constipation.   Genitorurinary (M) No hematuria, dysuria, or incontinence. No abnormal bleeding.   Integumentary No rashes or yellowing of the skin   Neurologic No headache, blurred vision, and no areas of focal weakness. Normal gait.   Psychiatric No insomnia, depression, joseph or mood swings.         Vital Signs:  /78 (BP Location: Left arm, Patient Position: Sitting, Cuff Size: adult)   Pulse 86   Temp 96.8 °F (36 °C) (Tympanic)   Resp 18   Ht 1.549 m (5' 0.98\")   Wt 54.9 kg (121 lb)   SpO2 98%   BMI 22.87 kg/m²     Physical Examination:    Constitutional Normal - Mood and affect appropriate. Appears close to chronological age. Well nourished. Well developed.   Eyes Normal - Conjunctivae and sclerae are clear and without icterus. Pupils are reactive and equal.   Hematologic/Lymphatic Normal - No petechiae or purpura.  No tender or palpable lymph nodes in the cervical, supraclavicular, axillary or inguinal area.   Respiratory Normal - Lungs are clear to auscultation, no rhonchi or wheezing.   Cardiovascular Normal - Regular rate and rhythm, no murmurs, gallops or rubs.   Abdomen Normal - Non-tender, non-distended, no masses, ascites or hepatosplenomegaly.    Extremities Normal - No visible deformities, no cyanosis, clubbing or edema.    Integumentary Normal - No rashes, No Jaundice   Neurologic Normal - No  sensory or motor deficits, normal cerebellar function, normal gait, cranial nerves intact.   Psychiatric Normal - Alert and oriented times three. Coherent speech. Verbalizes understanding of our discussions today.         Laboratory:   Latest Reference Range & Units 04/16/25 13:02   Sodium 136 - 145 mmol/L 131 (L)   Potassium 3.5 - 5.1 mmol/L 5.7 (H)   Chloride 98 - 112 mmol/L 99   Carbon Dioxide, Total 21.0 - 32.0 mmol/L 27.0   BUN 9 - 23 mg/dL 45 (H)   CREATININE 0.70 - 1.30 mg/dL 4.02 (H)   CALCIUM 8.7 - 10.6 mg/dL 9.5   EGFR >=60 mL/min/1.73m2 14 (L)   ANION GAP 0 - 18 mmol/L 5   CALCULATED OSMOLALITY 275 - 295 mOsm/kg 284   ALKALINE PHOSPHATASE 45 - 117 U/L 56   AST (SGOT) <34 U/L 14   ALT (SGPT) 10 - 49 U/L 10   Total Bilirubin 0.2 - 1.1 mg/dL 0.4   Globulin 2.0 - 3.5 g/dL 2.7   Iron, Serum 65 - 175 ug/dL 41 (L)   Transferrin 215 - 365 mg/dL 174 (L)   Iron Bind.Cap.(TIBC) 250 - 425 ug/dL 231 (L)   Iron Saturation 20 - 50 % 18 (L)   FERRITIN 50 - 336 ng/mL 628 (H)   (L): Data is abnormally low  (H): Data is abnormally high   Latest Reference Range & Units 04/16/25 13:02   A/G Ratio 1.0 - 2.0  1.6   PROTEIN, TOTAL 5.7 - 8.2 g/dL 7.1   Albumin 3.2 - 4.8 g/dL 4.4      Latest Reference Range & Units 04/16/25 13:02   WBC 4.0 - 11.0 x10(3) uL 8.4   Hemoglobin 13.0 - 17.5 g/dL 10.6 (L)   Hematocrit 39.0 - 53.0 % 32.4 (L)   Platelet Count 150.0 - 450.0 10(3)uL 281.0   RBC 3.80 - 5.80 x10(6)uL 4.88   MCH 26.0 - 34.0 pg 21.7 (L)   MCHC 31.0 - 37.0 g/dL 32.7   MCV 80.0 - 100.0 fL 66.4 (L)   RDW % 15.1   Prelim Neutrophil Abs 1.50 - 7.70 x10 (3) uL 6.61   Neutrophils Absolute 1.50 - 7.70 x10(3) uL 6.61   Lymphocytes Absolute 1.00 - 4.00 x10(3) uL 0.83 (L)   Monocytes Absolute 0.10 - 1.00 x10(3) uL 0.81   Eosinophils Absolute 0.00 - 0.70 x10(3) uL 0.04   Basophils Absolute 0.00 - 0.20 x10(3) uL 0.03   Immature Granulocyte Absolute 0.00 - 1.00 x10(3) uL 0.03   Neutrophils % % 79.1   Lymphocytes % % 9.9   Monocytes % % 9.7    Eosinophils % % 0.5   Basophils % % 0.4   Immature Granulocyte % % 0.4   (L): Data is abnormally low    Radiology:    Pathology:    Impression and Plan:  Anemia of renal insufficiency. Hgb 10.6 today. No injection needed today. Proceed with monthly labs and injection    Planned Follow Up:  Monthly labs with injection  Q3 Month MD visit with labs    The patient and I have a longitudinal relationship to address/treat this serious and complex condition    Electronically Signed by:    Ananda Romero M.D.  [facility]

## 2025-04-16 NOTE — PROGRESS NOTES
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 Possible Retacrit therapy today.  Patient c/o chronic back pain. Medication list and medical history  were reviewed and updated.     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  Patient instructed to call as needed.

## 2025-05-14 ENCOUNTER — OFFICE VISIT (OUTPATIENT)
Age: 89
End: 2025-05-14
Attending: INTERNAL MEDICINE
Payer: MEDICARE

## 2025-05-14 DIAGNOSIS — N18.4 ANEMIA ASSOCIATED WITH STAGE 4 CHRONIC RENAL FAILURE (HCC): Primary | ICD-10-CM

## 2025-05-14 DIAGNOSIS — D63.1 ANEMIA ASSOCIATED WITH STAGE 4 CHRONIC RENAL FAILURE (HCC): Primary | ICD-10-CM

## 2025-05-14 LAB — HGB BLD-MCNC: 9.3 G/DL (ref 13–17.5)

## 2025-05-14 NOTE — PROGRESS NOTES
Education Record    Learner:  Patient    Pt here for: Labs and Procrit inj    Barriers / Limitations:  None    Method:  Brief focused discussion and reinforcement    General Topics:  Plan of care reviewed    Outcome: Pt arrived ambulating independently accompanied by self. Hgb 9.3 - Procrit injection given per orders. Pt tolerated injection without complication. Discharged home in stable condition.

## 2025-06-05 DIAGNOSIS — E87.5 HYPERKALEMIA: ICD-10-CM

## 2025-06-05 DIAGNOSIS — D63.1 ANEMIA IN STAGE 4 CHRONIC KIDNEY DISEASE (HCC): ICD-10-CM

## 2025-06-05 DIAGNOSIS — N18.4 CKD (CHRONIC KIDNEY DISEASE) STAGE 4, GFR 15-29 ML/MIN (HCC): Primary | ICD-10-CM

## 2025-06-05 DIAGNOSIS — N18.4 ANEMIA IN STAGE 4 CHRONIC KIDNEY DISEASE (HCC): ICD-10-CM

## 2025-06-06 ENCOUNTER — LAB ENCOUNTER (OUTPATIENT)
Dept: LAB | Age: 89
End: 2025-06-06
Attending: INTERNAL MEDICINE
Payer: MEDICARE

## 2025-06-06 DIAGNOSIS — N18.4 CKD (CHRONIC KIDNEY DISEASE) STAGE 4, GFR 15-29 ML/MIN (HCC): ICD-10-CM

## 2025-06-06 DIAGNOSIS — E87.5 HYPERKALEMIA: ICD-10-CM

## 2025-06-06 DIAGNOSIS — D63.1 ANEMIA IN STAGE 4 CHRONIC KIDNEY DISEASE (HCC): ICD-10-CM

## 2025-06-06 DIAGNOSIS — N18.4 ANEMIA IN STAGE 4 CHRONIC KIDNEY DISEASE (HCC): ICD-10-CM

## 2025-06-06 LAB
ANION GAP SERPL CALC-SCNC: 13 MMOL/L (ref 0–18)
BASOPHILS # BLD AUTO: 0.02 X10(3) UL (ref 0–0.2)
BASOPHILS NFR BLD AUTO: 0.2 %
BUN BLD-MCNC: 79 MG/DL (ref 9–23)
CALCIUM BLD-MCNC: 10 MG/DL (ref 8.7–10.6)
CHLORIDE SERPL-SCNC: 99 MMOL/L (ref 98–112)
CO2 SERPL-SCNC: 26 MMOL/L (ref 21–32)
CREAT BLD-MCNC: 3.8 MG/DL (ref 0.7–1.3)
EGFRCR SERPLBLD CKD-EPI 2021: 15 ML/MIN/1.73M2 (ref 60–?)
EOSINOPHIL # BLD AUTO: 0.04 X10(3) UL (ref 0–0.7)
EOSINOPHIL NFR BLD AUTO: 0.4 %
ERYTHROCYTE [DISTWIDTH] IN BLOOD BY AUTOMATED COUNT: 16.4 %
FASTING STATUS PATIENT QL REPORTED: YES
GLUCOSE BLD-MCNC: 130 MG/DL (ref 70–99)
HCT VFR BLD AUTO: 29.6 % (ref 39–53)
HGB BLD-MCNC: 9.2 G/DL (ref 13–17.5)
IMM GRANULOCYTES # BLD AUTO: 0.05 X10(3) UL (ref 0–1)
IMM GRANULOCYTES NFR BLD: 0.5 %
LYMPHOCYTES # BLD AUTO: 0.79 X10(3) UL (ref 1–4)
LYMPHOCYTES NFR BLD AUTO: 7.6 %
MAGNESIUM SERPL-MCNC: 2.7 MG/DL (ref 1.6–2.6)
MCH RBC QN AUTO: 21 PG (ref 26–34)
MCHC RBC AUTO-ENTMCNC: 31.1 G/DL (ref 31–37)
MCV RBC AUTO: 67.6 FL (ref 80–100)
MONOCYTES # BLD AUTO: 1.14 X10(3) UL (ref 0.1–1)
MONOCYTES NFR BLD AUTO: 11 %
NEUTROPHILS # BLD AUTO: 8.32 X10 (3) UL (ref 1.5–7.7)
NEUTROPHILS # BLD AUTO: 8.32 X10(3) UL (ref 1.5–7.7)
NEUTROPHILS NFR BLD AUTO: 80.3 %
OSMOLALITY SERPL CALC.SUM OF ELEC: 311 MOSM/KG (ref 275–295)
PHOSPHATE SERPL-MCNC: 4.5 MG/DL (ref 2.4–5.1)
PLATELET # BLD AUTO: 229 10(3)UL (ref 150–450)
PLATELETS.RETICULATED NFR BLD AUTO: 6.9 % (ref 0–7)
POTASSIUM SERPL-SCNC: 5.1 MMOL/L (ref 3.5–5.1)
RBC # BLD AUTO: 4.38 X10(6)UL (ref 3.8–5.8)
SODIUM SERPL-SCNC: 138 MMOL/L (ref 136–145)
WBC # BLD AUTO: 10.4 X10(3) UL (ref 4–11)

## 2025-06-06 PROCEDURE — 36415 COLL VENOUS BLD VENIPUNCTURE: CPT

## 2025-06-06 PROCEDURE — 80048 BASIC METABOLIC PNL TOTAL CA: CPT

## 2025-06-06 PROCEDURE — 83735 ASSAY OF MAGNESIUM: CPT

## 2025-06-06 PROCEDURE — 85025 COMPLETE CBC W/AUTO DIFF WBC: CPT

## 2025-06-06 PROCEDURE — 84100 ASSAY OF PHOSPHORUS: CPT

## 2025-06-10 ENCOUNTER — OFFICE VISIT (OUTPATIENT)
Dept: NEPHROLOGY | Facility: CLINIC | Age: 89
End: 2025-06-10
Payer: MEDICARE

## 2025-06-10 VITALS — DIASTOLIC BLOOD PRESSURE: 62 MMHG | SYSTOLIC BLOOD PRESSURE: 134 MMHG | BODY MASS INDEX: 23 KG/M2 | WEIGHT: 121.63 LBS

## 2025-06-10 DIAGNOSIS — N18.4 CKD (CHRONIC KIDNEY DISEASE) STAGE 4, GFR 15-29 ML/MIN (HCC): Primary | ICD-10-CM

## 2025-06-10 PROCEDURE — 1159F MED LIST DOCD IN RCRD: CPT | Performed by: INTERNAL MEDICINE

## 2025-06-10 PROCEDURE — 99213 OFFICE O/P EST LOW 20 MIN: CPT | Performed by: INTERNAL MEDICINE

## 2025-06-10 PROCEDURE — 1160F RVW MEDS BY RX/DR IN RCRD: CPT | Performed by: INTERNAL MEDICINE

## 2025-06-10 RX ORDER — DOCUSATE SODIUM 100 MG/1
100 CAPSULE, LIQUID FILLED ORAL 2 TIMES DAILY PRN
Qty: 60 CAPSULE | Refills: 0 | Status: SHIPPED | OUTPATIENT
Start: 2025-06-10

## 2025-06-10 NOTE — PROGRESS NOTES
Nephrology Clinic Note      REASON:  CKD4         HPI:   Heavenly Perez is a 89 year old male with   Chief Complaint   Patient presents with    Acute Renal Injury    Renal Cyst    Hypokalemia     Brayden Reich MD    88 y/o pleasant male with hx of HTN, CAD, DM, CKD 4/5 presents for follow up   He is doing well  Denies any cp/sob  No n/v  NO edema        Has declined access planning- would prefer to get a HD cath when needs dialysis    He has esophageal stricture - eats all his calories in liquid form.            ROS:  See above; 12 systems reviewed and otherwise unremarkable     PMH:  Past Medical History:    Atherosclerosis of coronary artery    Diabetes (HCC)    Essential hypertension    Hearing impairment    no hearing aids    Heart attack (HCC)    High blood pressure    High cholesterol    History of angioplasty    History of quadruple bypass    Problems with swallowing    takes meds with applesauce    Renal disorder    ckd    Sleep apnea    no device or appliance         PSH:  Past Surgical History:   Procedure Laterality Date    Cabg           Medications (Active prior to today's visit):  Current Outpatient Medications   Medication Sig Dispense Refill    sodium zirconium cyclosilicate (LOKELMA) 10 g Oral Powd Pack Take 1 packet (10 g total) by mouth daily. 30 packet 0    brimonidine 0.2 % Ophthalmic Solution Place 1 drop into the right eye in the morning and 1 drop in the evening and 1 drop before bedtime.      fluticasone-salmeterol 100-50 MCG/ACT Inhalation Aerosol Powder, Breath Activated Inhale 1 puff into the lungs in the morning and 1 puff before bedtime.      ONETOUCH ULTRA In Vitro Strip by In Vitro route in the morning.      Lancet Devices (fluIT BiosystemsUCH DELICA PLUS LANCING) Does not apply Misc 1 strip by In Vitro route in the morning.      Lancets (ONETOUCH DELICA PLUS [id number]) Does not apply Misc 1 strip by In Vitro route in the morning.      Cholecalciferol (VITAMIN D3) 25 MCG (1000 UT) Oral  Cap Take 1 tablet by mouth in the morning.      allopurinol 100 MG Oral Tab Take 1 tablet (100 mg total) by mouth daily. 90 tablet 1    calcitriol 0.25 MCG Oral Cap Take 1 capsule (0.25 mcg total) by mouth Every Monday, Wednesday, and Friday. 36 capsule 1    docusate sodium 100 MG Oral Cap Take 1 capsule (100 mg total) by mouth 2 (two) times daily as needed for constipation. 60 capsule 1    torsemide 20 MG Oral Tab Take 1 tablet (20 mg total) by mouth daily as needed (for weight >138 lbs). 30 tablet 1    sodium bicarbonate 650 MG Oral Tab Take 1 tablet (650 mg total) by mouth 2 (two) times daily. 60 tablet 1    predniSONE 20 MG Oral Tab Take 40 mgs (2 tabs) daily x 3 days in event of acute gout attack. Please call your doctor as well. (Patient not taking: Reported on 4/16/2025) 6 tablet 3    Darbepoetin Orlin (ARANESP, ALBUMIN FREE,) 300 MCG/ML Injection Solution Inject 300 mcg as directed every 30 (thirty) days.      acetaminophen 500 MG Oral Tab Take 2 tablets (1,000 mg total) by mouth as needed in the morning and 2 tablets (1,000 mg total) as needed in the evening for Pain or Fever.      albuterol 108 (90 Base) MCG/ACT Inhalation Aero Soln Inhale 1 puff into the lungs as needed for Wheezing.      amLODIPine 5 MG Oral Tab Take 1 tablet (5 mg total) by mouth in the morning.      ascorbic acid 1000 MG Oral Tab Take 2 tablets (2,000 mg total) by mouth in the morning.      atorvastatin 40 MG Oral Tab Take 1 tablet (40 mg total) by mouth in the morning.      famotidine 40 MG Oral Tab Take 1 tablet (40 mg total) by mouth in the morning.      ferrous sulfate 325 (65 FE) MG Oral Tab EC Take 1 tablet (325 mg total) by mouth daily. (Patient taking differently: Take 1 tablet (325 mg total) by mouth in the morning. Take Tuesday and Friday.)      hydrALAZINE 100 MG Oral Tab Take 1 tablet (100 mg total) by mouth in the morning and 1 tablet (100 mg total) in the evening and 1 tablet (100 mg total) before bedtime.      isosorbide  mononitrate ER 30 MG Oral Tablet 24 Hr Take 1 tablet (30 mg total) by mouth in the morning.      methIMAzole 5 MG Oral Tab Take 1 tablet (5 mg total) by mouth in the morning.      metoprolol succinate ER 25 MG Oral Tablet 24 Hr Take 1 tablet (25 mg total) by mouth in the morning.      Olopatadine HCl 0.7 % Ophthalmic Solution Place 1 drop into both ears daily as needed.      SITagliptin Phosphate 25 MG Oral Tab Take 1 tablet (25 mg total) by mouth in the morning.      tamsulosin 0.4 MG Oral Cap Take 1 capsule (0.4 mg total) by mouth in the morning.      traMADol 50 MG Oral Tab Take 1 tablet (50 mg total) by mouth every 8 (eight) hours as needed for Pain.      traZODone 50 MG Oral Tab Take 1 tablet (50 mg total) by mouth nightly.           Allergies:  No Known Allergies    Social History:  Social History     Socioeconomic History    Marital status:    Tobacco Use    Smoking status: Former    Smokeless tobacco: Never   Vaping Use    Vaping status: Never Used   Substance and Sexual Activity    Alcohol use: Yes     Comment: occasional    Drug use: Never          Family History:  No family history on file.         PHYSICAL EXAM:   There were no vitals taken for this visit.   Wt Readings from Last 6 Encounters:   04/16/25 121 lb (54.9 kg)   04/08/25 125 lb 6.4 oz (56.9 kg)   03/19/25 127 lb 13.9 oz (58 kg)   02/19/25 132 lb (59.9 kg)   02/06/25 135 lb (61.2 kg)   02/04/25 138 lb 3.2 oz (62.7 kg)     General: Alert and oriented in no apparent distress.  HEENT: No scleral icterus, MMM  Neck: Supple, no ANDRE or thyromegaly  Cardiac: Regular rate and rhythm, MIGUELITO  Lungs: Clear  Abdomen: Soft, non-tender.  Extremities: Without clubbing, cyanosis or edema.  Neurologic:  normal affect, cranial nerves grossly intact, moving all extremities  Skin: Warm and dry, no rashes    Labs reviewed; 1/28          ASSESSMENT/PLAN:   There are no diagnoses linked to this encounter.        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 Romero   BMD - as above;   vit D 1000U daily OTC;   calcitriol 0.25 3x/week- cpm; @ goal  HTN- controlled; meds reviewed; cpm  DM-per primary   CAD- follows w/ Dr. Reddy   HL  Gout   Acidosis- realted to CKD:     oral bicarb    F/u in 2 mos      Evens Rivers MD  6/10/2025

## 2025-06-11 ENCOUNTER — OFFICE VISIT (OUTPATIENT)
Age: 89
End: 2025-06-11
Attending: INTERNAL MEDICINE
Payer: MEDICARE

## 2025-06-11 VITALS
WEIGHT: 120 LBS | OXYGEN SATURATION: 97 % | TEMPERATURE: 98 F | HEART RATE: 87 BPM | RESPIRATION RATE: 18 BRPM | BODY MASS INDEX: 22.66 KG/M2 | SYSTOLIC BLOOD PRESSURE: 123 MMHG | DIASTOLIC BLOOD PRESSURE: 62 MMHG | HEIGHT: 60.98 IN

## 2025-06-11 DIAGNOSIS — D63.1 ANEMIA ASSOCIATED WITH STAGE 4 CHRONIC RENAL FAILURE (HCC): Primary | ICD-10-CM

## 2025-06-11 DIAGNOSIS — N18.4 ANEMIA ASSOCIATED WITH STAGE 4 CHRONIC RENAL FAILURE (HCC): Primary | ICD-10-CM

## 2025-06-11 LAB — HGB BLD-MCNC: 8.4 G/DL (ref 13–17.5)

## 2025-06-11 NOTE — PROGRESS NOTES
Education Record    Learner:  Patient    Pt here for: procrit    Barriers / Limitations:  None    Method:  Brief focused, printed material and  reinforcement    General Topics:  Plan of care reviewed    Outcome: Pt concerned that his procrit is every 2 weeks now instead of weekly and hg is dropping.pt denies bleeding.  Pt reports feeling weaker than before. Per Dr Romero, ok to change back to weekly PL/procrit.

## 2025-06-18 ENCOUNTER — OFFICE VISIT (OUTPATIENT)
Age: 89
End: 2025-06-18
Attending: INTERNAL MEDICINE
Payer: MEDICARE

## 2025-06-18 VITALS
HEART RATE: 76 BPM | OXYGEN SATURATION: 99 % | HEIGHT: 60.98 IN | BODY MASS INDEX: 22.37 KG/M2 | RESPIRATION RATE: 18 BRPM | SYSTOLIC BLOOD PRESSURE: 133 MMHG | DIASTOLIC BLOOD PRESSURE: 61 MMHG | WEIGHT: 118.5 LBS | TEMPERATURE: 98 F

## 2025-06-18 DIAGNOSIS — D63.1 ANEMIA ASSOCIATED WITH STAGE 4 CHRONIC RENAL FAILURE (HCC): Primary | ICD-10-CM

## 2025-06-18 DIAGNOSIS — N18.4 ANEMIA ASSOCIATED WITH STAGE 4 CHRONIC RENAL FAILURE (HCC): Primary | ICD-10-CM

## 2025-06-18 LAB — HGB BLD-MCNC: 9.2 G/DL (ref 13–17.5)

## 2025-06-18 NOTE — PROGRESS NOTES
Education Record    Learner:  Patient    Disease / Diagnosis: here for procrit    Barriers / Limitations:  None    Method:  Brief focused, and  reinforcement    General Topics:  Plan of care reviewed    Outcome: patient ambulatory. No complaints. Tolerated injection. Has next appts. Shows understanding. Discharged in stable condition

## 2025-06-25 ENCOUNTER — OFFICE VISIT (OUTPATIENT)
Age: 89
End: 2025-06-25
Attending: INTERNAL MEDICINE
Payer: MEDICARE

## 2025-06-25 DIAGNOSIS — N18.4 ANEMIA ASSOCIATED WITH STAGE 4 CHRONIC RENAL FAILURE (HCC): Primary | ICD-10-CM

## 2025-06-25 DIAGNOSIS — D63.1 ANEMIA ASSOCIATED WITH STAGE 4 CHRONIC RENAL FAILURE (HCC): Primary | ICD-10-CM

## 2025-06-25 LAB — HGB BLD-MCNC: 9 G/DL (ref 13–17.5)

## 2025-07-02 ENCOUNTER — OFFICE VISIT (OUTPATIENT)
Age: 89
End: 2025-07-02
Attending: INTERNAL MEDICINE
Payer: MEDICARE

## 2025-07-02 DIAGNOSIS — N18.4 ANEMIA ASSOCIATED WITH STAGE 4 CHRONIC RENAL FAILURE (HCC): Primary | ICD-10-CM

## 2025-07-02 DIAGNOSIS — D63.1 ANEMIA ASSOCIATED WITH STAGE 4 CHRONIC RENAL FAILURE (HCC): Primary | ICD-10-CM

## 2025-07-02 LAB — HGB BLD-MCNC: 8.9 G/DL (ref 13–17.5)

## 2025-07-09 ENCOUNTER — OFFICE VISIT (OUTPATIENT)
Age: 89
End: 2025-07-09
Attending: INTERNAL MEDICINE
Payer: MEDICARE

## 2025-07-09 ENCOUNTER — NURSE ONLY (OUTPATIENT)
Age: 89
End: 2025-07-09
Attending: INTERNAL MEDICINE
Payer: MEDICARE

## 2025-07-09 VITALS
DIASTOLIC BLOOD PRESSURE: 52 MMHG | TEMPERATURE: 98 F | BODY MASS INDEX: 23.28 KG/M2 | HEIGHT: 60.98 IN | HEART RATE: 76 BPM | RESPIRATION RATE: 18 BRPM | SYSTOLIC BLOOD PRESSURE: 120 MMHG | OXYGEN SATURATION: 95 % | WEIGHT: 123.31 LBS

## 2025-07-09 DIAGNOSIS — D63.1 ANEMIA ASSOCIATED WITH STAGE 4 CHRONIC RENAL FAILURE (HCC): ICD-10-CM

## 2025-07-09 DIAGNOSIS — N18.4 ANEMIA ASSOCIATED WITH STAGE 4 CHRONIC RENAL FAILURE (HCC): Primary | ICD-10-CM

## 2025-07-09 DIAGNOSIS — N18.4 STAGE 4 CHRONIC KIDNEY DISEASE (HCC): ICD-10-CM

## 2025-07-09 DIAGNOSIS — D63.1 ANEMIA ASSOCIATED WITH STAGE 4 CHRONIC RENAL FAILURE (HCC): Primary | ICD-10-CM

## 2025-07-09 DIAGNOSIS — N18.4 ANEMIA ASSOCIATED WITH STAGE 4 CHRONIC RENAL FAILURE (HCC): ICD-10-CM

## 2025-07-09 LAB
ALBUMIN SERPL-MCNC: 4.5 G/DL (ref 3.2–4.8)
ALBUMIN/GLOB SERPL: 1.9 {RATIO} (ref 1–2)
ALP LIVER SERPL-CCNC: 66 U/L (ref 45–117)
ALT SERPL-CCNC: 7 U/L (ref 10–49)
ANION GAP SERPL CALC-SCNC: 6 MMOL/L (ref 0–18)
AST SERPL-CCNC: 12 U/L (ref ?–34)
BASOPHILS # BLD AUTO: 0.02 X10(3) UL (ref 0–0.2)
BASOPHILS NFR BLD AUTO: 0.3 %
BILIRUB SERPL-MCNC: 0.4 MG/DL (ref 0.2–1.1)
BUN BLD-MCNC: 58 MG/DL (ref 9–23)
CALCIUM BLD-MCNC: 9.3 MG/DL (ref 8.7–10.6)
CHLORIDE SERPL-SCNC: 92 MMOL/L (ref 98–112)
CO2 SERPL-SCNC: 29 MMOL/L (ref 21–32)
CREAT BLD-MCNC: 3.58 MG/DL (ref 0.7–1.3)
DEPRECATED HBV CORE AB SER IA-ACNC: 436 NG/ML (ref 50–336)
EGFRCR SERPLBLD CKD-EPI 2021: 16 ML/MIN/1.73M2 (ref 60–?)
EOSINOPHIL # BLD AUTO: 0.04 X10(3) UL (ref 0–0.7)
EOSINOPHIL NFR BLD AUTO: 0.5 %
ERYTHROCYTE [DISTWIDTH] IN BLOOD BY AUTOMATED COUNT: 16.2 %
FASTING STATUS PATIENT QL REPORTED: NO
GLOBULIN PLAS-MCNC: 2.4 G/DL (ref 2–3.5)
GLUCOSE BLD-MCNC: 139 MG/DL (ref 70–99)
HCT VFR BLD AUTO: 29.4 % (ref 39–53)
HGB BLD-MCNC: 9.1 G/DL (ref 13–17.5)
IMM GRANULOCYTES # BLD AUTO: 0.04 X10(3) UL (ref 0–1)
IMM GRANULOCYTES NFR BLD: 0.5 %
LYMPHOCYTES # BLD AUTO: 0.79 X10(3) UL (ref 1–4)
LYMPHOCYTES NFR BLD AUTO: 10.6 %
MCH RBC QN AUTO: 22 PG (ref 26–34)
MCHC RBC AUTO-ENTMCNC: 31 G/DL (ref 31–37)
MCV RBC AUTO: 71 FL (ref 80–100)
MONOCYTES # BLD AUTO: 0.79 X10(3) UL (ref 0.1–1)
MONOCYTES NFR BLD AUTO: 10.6 %
NEUTROPHILS # BLD AUTO: 5.79 X10 (3) UL (ref 1.5–7.7)
NEUTROPHILS # BLD AUTO: 5.79 X10(3) UL (ref 1.5–7.7)
NEUTROPHILS NFR BLD AUTO: 77.5 %
OSMOLALITY SERPL CALC.SUM OF ELEC: 282 MOSM/KG (ref 275–295)
PLATELET # BLD AUTO: 239 10(3)UL (ref 150–450)
POTASSIUM SERPL-SCNC: 4.9 MMOL/L (ref 3.5–5.1)
PROT SERPL-MCNC: 6.9 G/DL (ref 5.7–8.2)
RBC # BLD AUTO: 4.14 X10(6)UL (ref 3.8–5.8)
SODIUM SERPL-SCNC: 127 MMOL/L (ref 136–145)
WBC # BLD AUTO: 7.5 X10(3) UL (ref 4–11)

## 2025-07-09 NOTE — PROGRESS NOTES
Patient is here today for follow up with Dr. Romero for Anemia associated with Stage IV Chronic Renal Disease. Treated with Procrit. Patient denies pain. Medication list and medical history were reviewed and updated.     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  Patient instructed to call as needed.

## 2025-07-09 NOTE — PROGRESS NOTES
Cancer Center Progress Note  Patient Name: Heavenly Perez   YOB: 1935   Medical Record Number: [id number]   CSN: [id number]   Attending Physician: Ananda Romero M.D.       Date of Visit: 7/9/25      Chief Complaint:  No chief complaint on file.       Oncologic History:  Heavenly Perez is a 89 year old male referred by Dr. Rivers for evaluation of anemia.  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 He was getting it at [facility] before moving his nephrology care to [facility]. Starting at [facility], he was transitioned to Aranesp 300mcg monthly. He reports that he has tolerated it well, but he feels that his count has risen too high, and the Aranesp is far more expensive than the CANDE he was getting at [facility] (Retacrit).  He is referred to see if he can get his dosing here, with retacrit.     History of Present Illness:  Pt is here for follow up. He feels well. He is contemplating back surgery, but would need clearance from multiple specialties to do that.      Performance Status:  ECOG 1    Past Medical History:  Past Medical History:    Atherosclerosis of coronary artery    Diabetes (HCC)    Essential hypertension    Hearing impairment    no hearing aids    Heart attack (HCC)    High blood pressure    High cholesterol    History of angioplasty    History of quadruple bypass    Problems with swallowing    takes meds with applesauce    Renal disorder    ckd    Sleep apnea    no device or appliance       Past Surgical History:  Past Surgical History:   Procedure Laterality Date    Cabg         Family History:  No family history on file.    Social History:  Social History     Socioeconomic History    Marital status:      Spouse name: Not on file    Number of children: Not on file    Years of education: Not on file    Highest education level: Not on file   Occupational History    Not on file   Tobacco Use    Smoking  status: Former    Smokeless tobacco: Never   Vaping Use    Vaping status: Never Used   Substance and Sexual Activity    Alcohol use: Yes     Comment: occasional    Drug use: Never    Sexual activity: Not on file   Other Topics Concern    Not on file   Social History Narrative    Not on file     Social Drivers of Health     Food Insecurity: Not on file   Transportation Needs: Not on file   Housing Stability: Not on file       Current Medications:    Current Outpatient Medications:     DOCUSATE SODIUM 100 MG Oral Cap, Take 1 capsule (100 mg total) by mouth 2 (two) times daily as needed for constipation., Disp: 60 capsule, Rfl: 0    sodium zirconium cyclosilicate (LOKELMA) 10 g Oral Powd Pack, Take 1 packet (10 g total) by mouth daily., Disp: 30 packet, Rfl: 0    brimonidine 0.2 % Ophthalmic Solution, Place 1 drop into the right eye in the morning and 1 drop in the evening and 1 drop before bedtime., Disp: , Rfl:     fluticasone-salmeterol 100-50 MCG/ACT Inhalation Aerosol Powder, Breath Activated, Inhale 1 puff into the lungs in the morning and 1 puff before bedtime., Disp: , Rfl:     ONETOUCH ULTRA In Vitro Strip, by In Vitro route in the morning., Disp: , Rfl:     Lancet Devices (ONETOUCH DELICA PLUS LANCING) Does not apply Misc, 1 strip by In Vitro route in the morning., Disp: , Rfl:     Lancets (ONETOUCH DELICA PLUS OLYXUE40I) Does not apply Misc, 1 strip by In Vitro route in the morning., Disp: , Rfl:     Cholecalciferol (VITAMIN D3) 25 MCG (1000 UT) Oral Cap, Take 1 tablet by mouth in the morning., Disp: , Rfl:     allopurinol 100 MG Oral Tab, Take 1 tablet (100 mg total) by mouth daily., Disp: 90 tablet, Rfl: 1    calcitriol 0.25 MCG Oral Cap, Take 1 capsule (0.25 mcg total) by mouth Every Monday, Wednesday, and Friday., Disp: 36 capsule, Rfl: 1    torsemide 20 MG Oral Tab, Take 1 tablet (20 mg total) by mouth daily as needed (for weight >138 lbs)., Disp: 30 tablet, Rfl: 1    sodium bicarbonate 650 MG Oral Tab,  Take 1 tablet (650 mg total) by mouth 2 (two) times daily., Disp: 60 tablet, Rfl: 1    predniSONE 20 MG Oral Tab, Take 40 mgs (2 tabs) daily x 3 days in event of acute gout attack. Please call your doctor as well., Disp: 6 tablet, Rfl: 3    Darbepoetin Orlin (ARANESP, ALBUMIN FREE,) 300 MCG/ML Injection Solution, Inject 300 mcg as directed every 30 (thirty) days., Disp: , Rfl:     acetaminophen 500 MG Oral Tab, Take 2 tablets (1,000 mg total) by mouth as needed in the morning and 2 tablets (1,000 mg total) as needed in the evening for Pain or Fever., Disp: , Rfl:     albuterol 108 (90 Base) MCG/ACT Inhalation Aero Soln, Inhale 1 puff into the lungs as needed for Wheezing., Disp: , Rfl:     amLODIPine 5 MG Oral Tab, Take 1 tablet (5 mg total) by mouth in the morning., Disp: , Rfl:     ascorbic acid 1000 MG Oral Tab, Take 2 tablets (2,000 mg total) by mouth in the morning., Disp: , Rfl:     atorvastatin 40 MG Oral Tab, Take 1 tablet (40 mg total) by mouth in the morning., Disp: , Rfl:     famotidine 40 MG Oral Tab, Take 1 tablet (40 mg total) by mouth in the morning., Disp: , Rfl:     ferrous sulfate 325 (65 FE) MG Oral Tab EC, Take 1 tablet (325 mg total) by mouth daily. (Patient taking differently: Take 1 tablet (325 mg total) by mouth in the morning. Take Tuesday and Friday.), Disp: , Rfl:     hydrALAZINE 100 MG Oral Tab, Take 1 tablet (100 mg total) by mouth in the morning and 1 tablet (100 mg total) in the evening and 1 tablet (100 mg total) before bedtime., Disp: , Rfl:     isosorbide mononitrate ER 30 MG Oral Tablet 24 Hr, Take 1 tablet (30 mg total) by mouth in the morning., Disp: , Rfl:     methIMAzole 5 MG Oral Tab, Take 1 tablet (5 mg total) by mouth in the morning., Disp: , Rfl:     metoprolol succinate ER 25 MG Oral Tablet 24 Hr, Take 1 tablet (25 mg total) by mouth in the morning., Disp: , Rfl:     Olopatadine HCl 0.7 % Ophthalmic Solution, Place 1 drop into both ears daily as needed., Disp: , Rfl:      SITagliptin Phosphate 25 MG Oral Tab, Take 1 tablet (25 mg total) by mouth in the morning., Disp: , Rfl:     tamsulosin 0.4 MG Oral Cap, Take 1 capsule (0.4 mg total) by mouth in the morning., Disp: , Rfl:     traMADol 50 MG Oral Tab, Take 1 tablet (50 mg total) by mouth every 8 (eight) hours as needed for Pain., Disp: , Rfl:     traZODone 50 MG Oral Tab, Take 1 tablet (50 mg total) by mouth nightly., Disp: , Rfl:     Allergies:  No Known Allergies     Review of Systems:    Constitutional No fevers, chills, night sweats, excessive fatigue or weight loss.   Eyes No significant visual difficulties. No diplopia. No yellowing of the eyes.   Hematologic/Lymphatic No easy bruising or bleeding.  No any tender or palpable lymph nodes.   Respiratory No dyspnea, Pleuritic chest pain, cough or hemoptysis.   Cardiovascular No anginal chest pain, palpitations or orthopnea.   Gastrointestinal No nausea, vomiting, diarrhea, GI bleeding, or constipation.   Genitorurinary (M) No hematuria, dysuria, or incontinence. No abnormal bleeding.   Integumentary No rashes or yellowing of the skin   Neurologic No headache, blurred vision, and no areas of focal weakness. Normal gait.   Psychiatric No insomnia, depression, joseph or mood swings.         Vital Signs:  /52 (BP Location: Right arm, Patient Position: Sitting, Cuff Size: adult)   Pulse 76   Temp 97.8 °F (36.6 °C) (Temporal)   Resp 18   Ht 1.549 m (5' 0.98\")   Wt 55.9 kg (123 lb 4.8 oz)   SpO2 95%   BMI 23.31 kg/m²     Physical Examination:    Constitutional Normal - Mood and affect appropriate. Appears close to chronological age. Well nourished. Well developed.   Eyes Normal - Conjunctivae and sclerae are clear and without icterus. Pupils are reactive and equal.   Hematologic/Lymphatic Normal - No petechiae or purpura.  No tender or palpable lymph nodes in the cervical, supraclavicular, axillary or inguinal area.   Respiratory Normal - Lungs are clear to auscultation, no  rhonchi or wheezing.   Cardiovascular Normal - Regular rate and rhythm, no murmurs, gallops or rubs.   Abdomen Normal - Non-tender, non-distended, no masses, ascites or hepatosplenomegaly.    Extremities Normal - No visible deformities, no cyanosis, clubbing or edema.    Integumentary Normal - No rashes, No Jaundice   Neurologic Normal - No sensory or motor deficits, normal cerebellar function, normal gait, cranial nerves intact.   Psychiatric Normal - Alert and oriented times three. Coherent speech. Verbalizes understanding of our discussions today.         Laboratory:  Recent Labs     07/02/25  1255 07/09/25  0921   RBC  --  4.14   HGB 8.9 L 9.1 L   HCT  --  29.4 L   MCV  --  71.0 L   MCH  --  22.0 L   MCHC  --  31.0   RDW  --  16.2   NEPRELIM  --  5.79   WBC  --  7.5   PLT  --  239.0       Radiology:    Pathology:    Impression and Plan:  Anemia of renal insufficiency. Hgb 9.1 today. Give retacrit today. Proceed with weekly labs and injection. If he requires surgery, we can increase his dose to push his hgb higher for surgery. Check Iron studies today.    Planned Follow Up:  Weekly labs with injection  Q3 Month MD visit with labs    The patient and I have a longitudinal relationship to address/treat this serious and complex condition    Electronically Signed by:    Ananda Romero M.D.  [facility]

## 2025-07-09 NOTE — PROGRESS NOTES
Education Record    Learner:  Patient    Disease / Diagnosis: patient  here for procrit inj, hgb 9.1    Barriers / Limitations:  None   Comments:    Method:  Brief focused   Comments:    General Topics:  Plan of care reviewed   Comments:    Outcome:  Shows understanding   Comments:

## 2025-07-10 RX ORDER — CALCITRIOL 0.25 UG/1
0.25 CAPSULE, LIQUID FILLED ORAL
Qty: 36 CAPSULE | Refills: 1 | Status: SHIPPED | OUTPATIENT
Start: 2025-07-11

## 2025-07-16 ENCOUNTER — OFFICE VISIT (OUTPATIENT)
Age: 89
End: 2025-07-16
Attending: INTERNAL MEDICINE
Payer: MEDICARE

## 2025-07-16 DIAGNOSIS — D63.1 ANEMIA ASSOCIATED WITH STAGE 4 CHRONIC RENAL FAILURE (HCC): Primary | ICD-10-CM

## 2025-07-16 DIAGNOSIS — N18.4 ANEMIA ASSOCIATED WITH STAGE 4 CHRONIC RENAL FAILURE (HCC): Primary | ICD-10-CM

## 2025-07-16 LAB — HGB BLD-MCNC: 9.2 G/DL (ref 13–17.5)

## 2025-07-23 ENCOUNTER — OFFICE VISIT (OUTPATIENT)
Facility: LOCATION | Age: 89
End: 2025-07-23
Attending: INTERNAL MEDICINE
Payer: MEDICARE

## 2025-07-23 DIAGNOSIS — N18.4 ANEMIA ASSOCIATED WITH STAGE 4 CHRONIC RENAL FAILURE (HCC): Primary | ICD-10-CM

## 2025-07-23 DIAGNOSIS — D63.1 ANEMIA ASSOCIATED WITH STAGE 4 CHRONIC RENAL FAILURE (HCC): Primary | ICD-10-CM

## 2025-07-23 LAB — HGB BLD-MCNC: 9.6 G/DL (ref 13–17.5)

## 2025-07-23 NOTE — PROGRESS NOTES
Education Record    Learner:  Patient    Pt here for: Labs, Procrit inj    Barriers / Limitations:  None    Method:  Brief focused discussion and  reinforcement    General Topics:  Plan of care reviewed    Outcome: Pt arrived ambulating independently accompanied by self. Hgb 9.6 - Procrit inj given per order. Pt tolerated without c/o. Discharged home in stable condition - pt has follow up appts.

## 2025-07-30 ENCOUNTER — OFFICE VISIT (OUTPATIENT)
Facility: LOCATION | Age: 89
End: 2025-07-30
Attending: INTERNAL MEDICINE
Payer: MEDICARE

## 2025-07-30 DIAGNOSIS — N18.4 ANEMIA ASSOCIATED WITH STAGE 4 CHRONIC RENAL FAILURE (HCC): Primary | ICD-10-CM

## 2025-07-30 DIAGNOSIS — D63.1 ANEMIA ASSOCIATED WITH STAGE 4 CHRONIC RENAL FAILURE (HCC): Primary | ICD-10-CM

## 2025-07-30 LAB — HGB BLD-MCNC: 9.8 G/DL (ref 13–17.5)

## 2025-08-06 ENCOUNTER — OFFICE VISIT (OUTPATIENT)
Facility: LOCATION | Age: 89
End: 2025-08-06
Attending: INTERNAL MEDICINE

## 2025-08-06 DIAGNOSIS — N18.4 ANEMIA ASSOCIATED WITH STAGE 4 CHRONIC RENAL FAILURE (HCC): Primary | ICD-10-CM

## 2025-08-06 DIAGNOSIS — D63.1 ANEMIA ASSOCIATED WITH STAGE 4 CHRONIC RENAL FAILURE (HCC): Primary | ICD-10-CM

## 2025-08-06 LAB — HGB BLD-MCNC: 10.3 G/DL (ref 13–17.5)

## 2025-08-13 ENCOUNTER — OFFICE VISIT (OUTPATIENT)
Facility: LOCATION | Age: 89
End: 2025-08-13
Attending: INTERNAL MEDICINE

## 2025-08-13 DIAGNOSIS — D63.1 ANEMIA ASSOCIATED WITH STAGE 4 CHRONIC RENAL FAILURE (HCC): Primary | ICD-10-CM

## 2025-08-13 DIAGNOSIS — N18.4 ANEMIA ASSOCIATED WITH STAGE 4 CHRONIC RENAL FAILURE (HCC): Primary | ICD-10-CM

## 2025-08-13 LAB — HGB BLD-MCNC: 9.9 G/DL (ref 13–17.5)

## 2025-08-14 ENCOUNTER — LAB ENCOUNTER (OUTPATIENT)
Dept: LAB | Age: 89
End: 2025-08-14
Attending: INTERNAL MEDICINE

## 2025-08-14 DIAGNOSIS — N18.4 CKD (CHRONIC KIDNEY DISEASE) STAGE 4, GFR 15-29 ML/MIN (HCC): ICD-10-CM

## 2025-08-14 LAB
ANION GAP SERPL CALC-SCNC: 11 MMOL/L (ref 0–18)
BASOPHILS # BLD AUTO: 0.03 X10(3) UL (ref 0–0.2)
BASOPHILS NFR BLD AUTO: 0.4 %
BUN BLD-MCNC: 65 MG/DL (ref 9–23)
CALCIUM BLD-MCNC: 9.7 MG/DL (ref 8.7–10.6)
CHLORIDE SERPL-SCNC: 96 MMOL/L (ref 98–112)
CO2 SERPL-SCNC: 29 MMOL/L (ref 21–32)
CREAT BLD-MCNC: 3.6 MG/DL (ref 0.7–1.3)
EGFRCR SERPLBLD CKD-EPI 2021: 16 ML/MIN/1.73M2 (ref 60–?)
EOSINOPHIL # BLD AUTO: 0.05 X10(3) UL (ref 0–0.7)
EOSINOPHIL NFR BLD AUTO: 0.7 %
ERYTHROCYTE [DISTWIDTH] IN BLOOD BY AUTOMATED COUNT: 14.6 %
FASTING STATUS PATIENT QL REPORTED: YES
GLUCOSE BLD-MCNC: 103 MG/DL (ref 70–99)
HCT VFR BLD AUTO: 31.4 % (ref 39–53)
HGB BLD-MCNC: 10 G/DL (ref 13–17.5)
IMM GRANULOCYTES # BLD AUTO: 0.01 X10(3) UL (ref 0–1)
IMM GRANULOCYTES NFR BLD: 0.1 %
LYMPHOCYTES # BLD AUTO: 1.19 X10(3) UL (ref 1–4)
LYMPHOCYTES NFR BLD AUTO: 17.3 %
MAGNESIUM SERPL-MCNC: 2.6 MG/DL (ref 1.6–2.6)
MCH RBC QN AUTO: 21.5 PG (ref 26–34)
MCHC RBC AUTO-ENTMCNC: 31.8 G/DL (ref 31–37)
MCV RBC AUTO: 67.5 FL (ref 80–100)
MONOCYTES # BLD AUTO: 0.83 X10(3) UL (ref 0.1–1)
MONOCYTES NFR BLD AUTO: 12 %
NEUTROPHILS # BLD AUTO: 4.78 X10 (3) UL (ref 1.5–7.7)
NEUTROPHILS # BLD AUTO: 4.78 X10(3) UL (ref 1.5–7.7)
NEUTROPHILS NFR BLD AUTO: 69.5 %
OSMOLALITY SERPL CALC.SUM OF ELEC: 301 MOSM/KG (ref 275–295)
PHOSPHATE SERPL-MCNC: 5.2 MG/DL (ref 2.4–5.1)
PLATELET # BLD AUTO: 255 10(3)UL (ref 150–450)
POTASSIUM SERPL-SCNC: 4.9 MMOL/L (ref 3.5–5.1)
RBC # BLD AUTO: 4.65 X10(6)UL (ref 3.8–5.8)
SODIUM SERPL-SCNC: 136 MMOL/L (ref 136–145)
WBC # BLD AUTO: 6.9 X10(3) UL (ref 4–11)

## 2025-08-14 PROCEDURE — 80048 BASIC METABOLIC PNL TOTAL CA: CPT

## 2025-08-14 PROCEDURE — 85025 COMPLETE CBC W/AUTO DIFF WBC: CPT

## 2025-08-14 PROCEDURE — 83735 ASSAY OF MAGNESIUM: CPT

## 2025-08-14 PROCEDURE — 84100 ASSAY OF PHOSPHORUS: CPT

## 2025-08-14 PROCEDURE — 36415 COLL VENOUS BLD VENIPUNCTURE: CPT

## 2025-08-20 ENCOUNTER — OFFICE VISIT (OUTPATIENT)
Facility: LOCATION | Age: 89
End: 2025-08-20
Attending: INTERNAL MEDICINE

## 2025-08-20 VITALS
OXYGEN SATURATION: 98 % | HEIGHT: 60.98 IN | SYSTOLIC BLOOD PRESSURE: 121 MMHG | DIASTOLIC BLOOD PRESSURE: 61 MMHG | HEART RATE: 68 BPM | TEMPERATURE: 98 F | WEIGHT: 126.81 LBS | RESPIRATION RATE: 18 BRPM | BODY MASS INDEX: 23.94 KG/M2

## 2025-08-20 DIAGNOSIS — N18.4 ANEMIA ASSOCIATED WITH STAGE 4 CHRONIC RENAL FAILURE (HCC): Primary | ICD-10-CM

## 2025-08-20 DIAGNOSIS — D63.1 ANEMIA ASSOCIATED WITH STAGE 4 CHRONIC RENAL FAILURE (HCC): Primary | ICD-10-CM

## 2025-08-20 LAB — HGB BLD-MCNC: 10.2 G/DL (ref 13–17.5)

## 2025-08-27 ENCOUNTER — OFFICE VISIT (OUTPATIENT)
Facility: LOCATION | Age: 89
End: 2025-08-27
Attending: INTERNAL MEDICINE

## 2025-08-27 ENCOUNTER — APPOINTMENT (OUTPATIENT)
Facility: LOCATION | Age: 89
End: 2025-08-27
Attending: INTERNAL MEDICINE

## 2025-08-27 DIAGNOSIS — D63.1 ANEMIA ASSOCIATED WITH STAGE 4 CHRONIC RENAL FAILURE (HCC): Primary | ICD-10-CM

## 2025-08-27 DIAGNOSIS — N18.4 ANEMIA ASSOCIATED WITH STAGE 4 CHRONIC RENAL FAILURE (HCC): Primary | ICD-10-CM

## 2025-08-27 LAB — HGB BLD-MCNC: 10 G/DL (ref 13–17.5)

## (undated) DEVICE — SPONGE: SPECIALTY PEANUT XR 100/CS: Brand: MEDICAL ACTION INDUSTRIES

## (undated) DEVICE — LIGHT HANDLE

## (undated) DEVICE — SYRINGE MED 10ML LL TIP W/O SFTY DISP

## (undated) DEVICE — LAPAROTOMY CDS: Brand: MEDLINE INDUSTRIES, INC.

## (undated) DEVICE — SOLUTION IRRIG 1000ML 0.9% NACL USP BTL

## (undated) DEVICE — UNDYED BRAIDED (POLYGLACTIN 910), SYNTHETIC ABSORBABLE SUTURE: Brand: COATED VICRYL

## (undated) DEVICE — STERILE POLYISOPRENE POWDER-FREE SURGICAL GLOVES: Brand: PROTEXIS

## (undated) DEVICE — PREMIUM WET SKIN PREP TRAY: Brand: MEDLINE INDUSTRIES, INC.

## (undated) DEVICE — SLEEVE COMPR M KNEE LEN SGL USE KENDALL SCD

## (undated) DEVICE — SUPPORTER ATHL LG LG SPNS SPRT

## (undated) DEVICE — PEDIATRIC: Brand: MEDLINE INDUSTRIES, INC.

## (undated) DEVICE — SUTURE CHROMIC GUT SZ 4-0 L27IN ABSRB UD

## (undated) NOTE — LETTER
OUTSIDE TESTING RESULT REQUEST     IMPORTANT: FOR YOUR IMMEDIATE ATTENTION  Please FAX all test results listed below to: [phone]     Testing already done on or about: Appointment 2023 @ 10am     * * * * If testing is NOT complete, arrange with patient A.S.A.P. * * * *      Patient Name: Heavenly Perez  Surgery Date: 2023  Medical Record: [id number]  CSN: [id number]  : 1935 - A: 82 y     Sex: male  Surgeon(s):  Benjamin Packer MD  Procedure: LEFT SCROTAL HYDROCELECTOMY  Anesthesia Type: General     Surgeon: Benjamin Packer MD     The following Testing and Time Line are REQUIRED PER ANESTHESIA     EKG READ AND SIGNED WITHIN   90 days      Thank Eugenio Marrufo,   Sent by: Lakeshia Serna RN

## (undated) NOTE — LETTER
Patient Name: Heavenly Perez  Surgery Date: 9/20/2023  Medical Record: [id number] CSN: [id number]      Surgeon(s):  Aidan Chaves MD  Consent Procedure: LEFT SCROTAL HYDROCELECTOMY  Anesthesia Type: General    Patient is scheduled for surgery on 9/20/23. Patient is scheduled for preop evaluation/clearance with Dr. Mushtaq Byrd today at [phone]. Please address elevated bun & creatinine and fax preop eval/clearance to Pre admission testing [phone].     Thank you,    Pre admission testing  [phone]